# Patient Record
Sex: FEMALE | Race: WHITE | NOT HISPANIC OR LATINO | Employment: OTHER | URBAN - NONMETROPOLITAN AREA
[De-identification: names, ages, dates, MRNs, and addresses within clinical notes are randomized per-mention and may not be internally consistent; named-entity substitution may affect disease eponyms.]

---

## 2017-01-20 ENCOUNTER — OFFICE VISIT (OUTPATIENT)
Dept: FAMILY MEDICINE CLINIC | Facility: CLINIC | Age: 72
End: 2017-01-20

## 2017-01-20 VITALS
DIASTOLIC BLOOD PRESSURE: 80 MMHG | SYSTOLIC BLOOD PRESSURE: 130 MMHG | WEIGHT: 166 LBS | HEART RATE: 78 BPM | BODY MASS INDEX: 26.06 KG/M2 | OXYGEN SATURATION: 98 % | HEIGHT: 67 IN

## 2017-01-20 DIAGNOSIS — I10 ESSENTIAL HYPERTENSION: ICD-10-CM

## 2017-01-20 DIAGNOSIS — M51.36 DEGENERATIVE DISC DISEASE, LUMBAR: ICD-10-CM

## 2017-01-20 DIAGNOSIS — M15.9 DEGENERATIVE JOINT DISEASE INVOLVING MULTIPLE JOINTS ON BOTH SIDES OF BODY: ICD-10-CM

## 2017-01-20 DIAGNOSIS — E78.5 HYPERLIPIDEMIA, UNSPECIFIED HYPERLIPIDEMIA TYPE: ICD-10-CM

## 2017-01-20 DIAGNOSIS — E55.9 VITAMIN D DEFICIENCY: ICD-10-CM

## 2017-01-20 PROCEDURE — 99213 OFFICE O/P EST LOW 20 MIN: CPT | Performed by: GENERAL PRACTICE

## 2017-01-20 RX ORDER — HYDROCODONE BITARTRATE AND ACETAMINOPHEN 5; 325 MG/1; MG/1
1 TABLET ORAL EVERY 6 HOURS PRN
Qty: 120 TABLET | Refills: 0 | Status: SHIPPED | OUTPATIENT
Start: 2017-01-20 | End: 2017-03-17 | Stop reason: SDUPTHER

## 2017-01-20 NOTE — PROGRESS NOTES
Subjective   Jaleesa Nam is a 72 y.o. female.    For review and evaluation of management of chronic degenerative disc disease and osteoarthritis with pain in multiple joints. Has had multiple joint replacements.Pain is controlled with meds and no side effects. Needs refills.   Pain   This is a chronic problem. The current episode started more than 1 year ago. The problem occurs constantly. The problem has been unchanged. Associated symptoms include arthralgias and myalgias. Pertinent negatives include no abdominal pain, chest pain, chills, congestion, coughing, fatigue, fever, headaches, joint swelling, nausea, neck pain, numbness, rash, sore throat, vomiting or weakness. The symptoms are aggravated by walking. She has tried oral narcotics for the symptoms. The treatment provided significant relief.   Back Pain   This is a chronic problem. The current episode started more than 1 year ago. The problem occurs constantly. The problem is unchanged. The pain is present in the lumbar spine. The pain does not radiate. The pain is at a severity of 5/10. The pain is moderate. The pain is worse during the day. The symptoms are aggravated by bending, position, twisting and standing. Pertinent negatives include no abdominal pain, chest pain, dysuria, fever, headaches, numbness or weakness. She has tried analgesics for the symptoms. The treatment provided significant relief.      The following portions of the patient's history were reviewed and updated as appropriate: allergies, current medications, past social history and problem list.    Review of Systems   Constitutional: Negative.  Negative for activity change, appetite change, chills, fatigue, fever and unexpected weight change.   HENT: Negative.  Negative for congestion, ear pain, hearing loss, nosebleeds, rhinorrhea, sinus pressure, sneezing, sore throat, tinnitus and trouble swallowing.    Eyes: Negative.  Negative for pain, discharge, redness, itching and  "visual disturbance.   Respiratory: Negative.  Negative for apnea, cough, chest tightness, shortness of breath and wheezing.    Cardiovascular: Negative.  Negative for chest pain, palpitations and leg swelling.   Gastrointestinal: Negative.  Negative for abdominal distention, abdominal pain, constipation, diarrhea, nausea and vomiting.   Endocrine: Negative.    Genitourinary: Negative.  Negative for dysuria, frequency and urgency.   Musculoskeletal: Positive for arthralgias, back pain and myalgias. Negative for gait problem, joint swelling, neck pain and neck stiffness.   Skin: Negative.  Negative for color change and rash.   Allergic/Immunologic: Negative.    Neurological: Negative.  Negative for dizziness, weakness, light-headedness, numbness and headaches.   Hematological: Negative.  Negative for adenopathy.   Psychiatric/Behavioral: Negative.  Negative for dysphoric mood and sleep disturbance. The patient is not nervous/anxious.      Objective   Visit Vitals   • /80 (BP Location: Left arm, Patient Position: Sitting, Cuff Size: Adult)   • Pulse 78   • Ht 67\" (170.2 cm)   • Wt 166 lb (75.3 kg)   • SpO2 98%   • BMI 26 kg/m2     Physical Exam   Constitutional: She is oriented to person, place, and time. She appears well-developed and well-nourished. No distress.   HENT:   Head: Normocephalic and atraumatic.   Nose: Nose normal.   Mouth/Throat: Oropharynx is clear and moist.   Eyes: Conjunctivae and EOM are normal. Pupils are equal, round, and reactive to light. Right eye exhibits no discharge. Left eye exhibits no discharge.   Neck: No thyromegaly present.   Cardiovascular: Normal rate, regular rhythm, normal heart sounds and intact distal pulses.    Pulmonary/Chest: Effort normal and breath sounds normal.   Musculoskeletal:        Right shoulder: She exhibits tenderness.        Left shoulder: She exhibits tenderness.        Left knee: Tenderness found.        Lumbar back: She exhibits decreased range of motion " and tenderness.   Lymphadenopathy:     She has no cervical adenopathy.   Neurological: She is alert and oriented to person, place, and time.   Skin: Skin is warm and dry.   Psychiatric: She has a normal mood and affect.   Nursing note and vitals reviewed.    Assessment/Plan   Problem List Items Addressed This Visit        Cardiovascular and Mediastinum    Essential hypertension    Relevant Orders    Comprehensive Metabolic Panel    LDL Cholesterol, Direct    Lipid Panel       Musculoskeletal and Integument    Degenerative joint disease involving multiple joints on both sides of body    Relevant Orders    Urine Drug Screen      Other Visit Diagnoses     Hyperlipidemia, unspecified hyperlipidemia type        Relevant Orders    Comprehensive Metabolic Panel    LDL Cholesterol, Direct    Lipid Panel    Vitamin D deficiency        Relevant Orders    Vitamin D 25 Hydroxy    Degenerative disc disease, lumbar          Ricki reviewed and appropriate. Not recommended to drive or operate heavy equipment while taking potentially sedating meds.         New Medications Ordered This Visit   Medications   • HYDROcodone-acetaminophen (NORCO) 5-325 MG per tablet     Sig: Take 1 tablet by mouth Every 6 (Six) Hours As Needed for moderate pain (4-6).     Dispense:  120 tablet     Refill:  0

## 2017-01-20 NOTE — MR AVS SNAPSHOT
Jaleesa Nam   1/20/2017 11:30 AM   Office Visit    Dept Phone:  [phone]   Encounter #:  [id number]    Provider:  Emi Wright MD   Department:  [facility]                Your Full Care Plan              Today's Medication Changes          These changes are accurate as of: 1/20/17 11:58 AM.  If you have any questions, ask your nurse or doctor.               Stop taking medication(s)listed here:     doxycycline 100 MG capsule   Commonly known as:  VIBRAMYCIN   Stopped by:  Emi Wright MD           NASAFLO NETI POT NASAL WASH pack   Stopped by:  Emi Wright MD           predniSONE 20 MG tablet   Commonly known as:  DELTASONE   Stopped by:  Emi Wright MD                Where to Get Your Medications      You can get these medications from any pharmacy     Bring a paper prescription for each of these medications     HYDROcodone-acetaminophen 5-325 MG per tablet                  Your Updated Medication List          This list is accurate as of: 1/20/17 11:58 AM.  Always use your most recent med list.                amitriptyline 25 MG tablet   Commonly known as:  ELAVIL   Take 1 tablet by mouth Every Night.       CALTRATE 600+D 600-400 MG-UNIT per tablet   Generic drug:  calcium carbonate-vitamin d       HYDROcodone-acetaminophen 5-325 MG per tablet   Commonly known as:  NORCO   Take 1 tablet by mouth Every 6 (Six) Hours As Needed for moderate pain (4-6).       * lisinopril-hydrochlorothiazide 20-12.5 MG per tablet   Commonly known as:  PRINZIDE,ZESTORETIC       * lisinopril-hydrochlorothiazide 20-12.5 MG per tablet   Commonly known as:  PRINZIDE,ZESTORETIC   Take 1 tablet by mouth 2 (Two) Times a Day for blood pressure.       * Notice:  This list has 2 medication(s) that are the same as other medications prescribed for you. Read the directions carefully, and ask your doctor or other care provider to review them with you.            We Performed the Following     Urine Drug Screen       You Were Diagnosed With        Codes Comments    Hyperlipidemia, unspecified hyperlipidemia type    -  Primary ICD-10-CM: E78.5  ICD-9-CM: 272.4     Vitamin D deficiency     ICD-10-CM: E55.9  ICD-9-CM: 268.9     Essential hypertension     ICD-10-CM: I10  ICD-9-CM: 401.9     Degenerative joint disease involving multiple joints on both sides of body     ICD-10-CM: M15.9  ICD-9-CM: 715.89       Instructions     None    Patient Instructions History      Upcoming Appointments     Visit Type Date Time Department    OFFICE VISIT 1/20/2017 11:30 AM [facility]    OFFICE VISIT 3/17/2017  1:15 PM [facility]    PHYSICAL 6/12/2017  2:00 PM [facility]    SUBSEQUENT MEDICARE WELLNESS 6/12/2017  2:15 PM [facility]      MyChart Signup     Our records indicate that you have declined [facility] signup. If you would like to sign up for [employer], please email [email or web address] or call [phone] to obtain an activation code.             Other Info from Your Visit           Your Appointments     Mar 17, 2017  1:15 PM CDT   Office Visit with Emi Wright MD   [facility] (--)    [address]  [place] [address] [place] [id number] [phone]           Arrive 15 minutes prior to appointment.            Jun 12, 2017  2:00 PM CDT   Physical with Emi Wright MD   [facility] (--)    [address]  [place] [address] [place] [id number] [phone]           Arrive 15 minutes prior to appointment.            Jun 12, 2017  2:15 PM CDT   Subsequent Medicare Wellness with Emi Wright MD   [facility] (--)    [address]  [place] [address] [place] [id number] [phone]              Allergies     No Known Allergies      Reason for Visit     Med Refill     Pain      "Back Pain           Vital Signs     Blood Pressure Pulse Height Weight Oxygen Saturation Body Mass Index    130/80 (BP Location: Left arm, Patient Position: Sitting, Cuff Size: Adult) 78 67\" (170.2 cm) 166 lb (75.3 kg) 98% 26 kg/m2    Smoking Status                   Never Smoker           Problems and Diagnoses Noted     Degenerative joint disease involving multiple joints on both sides of body    High blood pressure    High cholesterol or triglycerides    -  Primary    Vitamin D deficiency            "

## 2017-03-17 ENCOUNTER — OFFICE VISIT (OUTPATIENT)
Dept: FAMILY MEDICINE CLINIC | Facility: CLINIC | Age: 72
End: 2017-03-17

## 2017-03-17 VITALS
WEIGHT: 166 LBS | HEIGHT: 67 IN | BODY MASS INDEX: 26.06 KG/M2 | OXYGEN SATURATION: 98 % | HEART RATE: 73 BPM | SYSTOLIC BLOOD PRESSURE: 134 MMHG | DIASTOLIC BLOOD PRESSURE: 60 MMHG

## 2017-03-17 DIAGNOSIS — M15.9 DEGENERATIVE JOINT DISEASE INVOLVING MULTIPLE JOINTS ON BOTH SIDES OF BODY: Primary | ICD-10-CM

## 2017-03-17 DIAGNOSIS — M51.36 DEGENERATIVE DISC DISEASE, LUMBAR: ICD-10-CM

## 2017-03-17 PROCEDURE — 99214 OFFICE O/P EST MOD 30 MIN: CPT | Performed by: GENERAL PRACTICE

## 2017-03-17 RX ORDER — HYDROCODONE BITARTRATE AND ACETAMINOPHEN 5; 325 MG/1; MG/1
1 TABLET ORAL EVERY 6 HOURS PRN
Qty: 120 TABLET | Refills: 0 | Status: SHIPPED | OUTPATIENT
Start: 2017-03-17 | End: 2017-05-19 | Stop reason: SDUPTHER

## 2017-03-17 NOTE — PROGRESS NOTES
Subjective   Jaleesa Nam is a 72 y.o. female.    For review and evaluation of management of chronic degenerative disc disease and osteoarthritis with pain in multiple joints. Has had multiple joint replacements.Pain is controlled with meds and no side effects. Needs refills.   Pain   This is a chronic problem. The current episode started more than 1 year ago. The problem occurs constantly. The problem has been unchanged. Associated symptoms include arthralgias and myalgias. Pertinent negatives include no abdominal pain, chills, congestion, coughing, fatigue, fever, joint swelling, nausea, numbness, rash, sore throat, vomiting or weakness. The symptoms are aggravated by walking. She has tried oral narcotics for the symptoms. The treatment provided significant relief.   Back Pain   This is a chronic problem. The current episode started more than 1 year ago. The problem occurs constantly. The problem is unchanged. The pain is present in the lumbar spine. The pain does not radiate. The pain is at a severity of 5/10. The pain is moderate. The pain is worse during the day. The symptoms are aggravated by bending, position, twisting and standing. Pertinent negatives include no abdominal pain, dysuria, fever, numbness or weakness. She has tried analgesics for the symptoms. The treatment provided significant relief.      The following portions of the patient's history were reviewed and updated as appropriate: allergies, current medications, past social history and problem list.    Review of Systems   Constitutional: Negative.  Negative for activity change, appetite change, chills, fatigue, fever and unexpected weight change.   HENT: Negative.  Negative for congestion, ear pain, hearing loss, nosebleeds, rhinorrhea, sinus pressure, sneezing, sore throat, tinnitus and trouble swallowing.    Eyes: Negative.  Negative for pain, discharge, redness, itching and visual disturbance.   Respiratory: Negative.  Negative for  "apnea, cough, chest tightness and wheezing.    Cardiovascular: Negative.  Negative for leg swelling.   Gastrointestinal: Negative.  Negative for abdominal distention, abdominal pain, constipation, diarrhea, nausea and vomiting.   Endocrine: Negative.    Genitourinary: Negative.  Negative for dysuria, frequency and urgency.   Musculoskeletal: Positive for arthralgias, back pain and myalgias. Negative for gait problem, joint swelling and neck stiffness.   Skin: Negative.  Negative for color change and rash.   Allergic/Immunologic: Negative.    Neurological: Negative.  Negative for dizziness, weakness, light-headedness and numbness.   Hematological: Negative.  Negative for adenopathy.   Psychiatric/Behavioral: Negative.  Negative for dysphoric mood and sleep disturbance. The patient is not nervous/anxious.      Objective   Visit Vitals   • /60 (BP Location: Left arm, Patient Position: Sitting, Cuff Size: Adult)   • Pulse 73   • Ht 67\" (170.2 cm)   • Wt 166 lb (75.3 kg)   • SpO2 98%   • BMI 26 kg/m2     Physical Exam   Constitutional: She is oriented to person, place, and time. She appears well-developed and well-nourished. No distress.   HENT:   Head: Normocephalic and atraumatic.   Nose: Nose normal.   Mouth/Throat: Oropharynx is clear and moist.   Eyes: Conjunctivae and EOM are normal. Pupils are equal, round, and reactive to light. Right eye exhibits no discharge. Left eye exhibits no discharge.   Neck: No thyromegaly present.   Cardiovascular: Normal rate, regular rhythm, normal heart sounds and intact distal pulses.    Pulmonary/Chest: Effort normal and breath sounds normal.   Musculoskeletal:        Right shoulder: She exhibits tenderness.        Left shoulder: She exhibits tenderness.        Left knee: Tenderness found.        Lumbar back: She exhibits decreased range of motion and tenderness.   Lymphadenopathy:     She has no cervical adenopathy.   Neurological: She is alert and oriented to person, place, " and time.   Skin: Skin is warm and dry.   Psychiatric: She has a normal mood and affect.   Nursing note and vitals reviewed.    Assessment/Plan   Problem List Items Addressed This Visit        Musculoskeletal and Integument    Degenerative joint disease involving multiple joints on both sides of body - Primary      Other Visit Diagnoses     Degenerative disc disease, lumbar            Ricki reviewed and appropriate. Not recommended to drive or operate heavy equipment while taking potentially sedating meds.         New Medications Ordered This Visit   Medications   • HYDROcodone-acetaminophen (NORCO) 5-325 MG per tablet     Sig: Take 1 tablet by mouth Every 6 (Six) Hours As Needed for Moderate Pain (4-6).     Dispense:  120 tablet     Refill:  0

## 2017-03-20 RX ORDER — LISINOPRIL AND HYDROCHLOROTHIAZIDE 20; 12.5 MG/1; MG/1
1 TABLET ORAL 2 TIMES DAILY
Qty: 180 TABLET | Refills: 3 | Status: SHIPPED | OUTPATIENT
Start: 2017-03-20 | End: 2017-11-14 | Stop reason: SDUPTHER

## 2017-05-17 ENCOUNTER — TELEPHONE (OUTPATIENT)
Dept: FAMILY MEDICINE CLINIC | Facility: CLINIC | Age: 72
End: 2017-05-17

## 2017-05-19 ENCOUNTER — OFFICE VISIT (OUTPATIENT)
Dept: FAMILY MEDICINE CLINIC | Facility: CLINIC | Age: 72
End: 2017-05-19

## 2017-05-19 VITALS
HEART RATE: 97 BPM | DIASTOLIC BLOOD PRESSURE: 70 MMHG | HEIGHT: 67 IN | WEIGHT: 166 LBS | BODY MASS INDEX: 26.06 KG/M2 | SYSTOLIC BLOOD PRESSURE: 120 MMHG

## 2017-05-19 DIAGNOSIS — M51.36 DEGENERATIVE DISC DISEASE, LUMBAR: ICD-10-CM

## 2017-05-19 DIAGNOSIS — I10 ESSENTIAL HYPERTENSION: ICD-10-CM

## 2017-05-19 DIAGNOSIS — Z11.59 NEED FOR HEPATITIS C SCREENING TEST: ICD-10-CM

## 2017-05-19 DIAGNOSIS — M15.9 DEGENERATIVE JOINT DISEASE INVOLVING MULTIPLE JOINTS ON BOTH SIDES OF BODY: Primary | Chronic | ICD-10-CM

## 2017-05-19 PROCEDURE — 99213 OFFICE O/P EST LOW 20 MIN: CPT | Performed by: GENERAL PRACTICE

## 2017-05-19 RX ORDER — HYDROCODONE BITARTRATE AND ACETAMINOPHEN 5; 325 MG/1; MG/1
1 TABLET ORAL EVERY 6 HOURS PRN
Qty: 120 TABLET | Refills: 0 | Status: SHIPPED | OUTPATIENT
Start: 2017-05-19 | End: 2017-06-12 | Stop reason: SDUPTHER

## 2017-06-09 DIAGNOSIS — Z13.820 ENCOUNTER FOR SCREENING FOR OSTEOPOROSIS: Primary | ICD-10-CM

## 2017-06-09 DIAGNOSIS — Z12.31 ENCOUNTER FOR SCREENING MAMMOGRAM FOR MALIGNANT NEOPLASM OF BREAST: ICD-10-CM

## 2017-06-12 ENCOUNTER — OFFICE VISIT (OUTPATIENT)
Dept: FAMILY MEDICINE CLINIC | Facility: CLINIC | Age: 72
End: 2017-06-12

## 2017-06-12 ENCOUNTER — LAB (OUTPATIENT)
Dept: LAB | Facility: HOSPITAL | Age: 72
End: 2017-06-12

## 2017-06-12 VITALS
OXYGEN SATURATION: 97 % | SYSTOLIC BLOOD PRESSURE: 110 MMHG | HEART RATE: 72 BPM | WEIGHT: 168 LBS | HEIGHT: 66 IN | BODY MASS INDEX: 27 KG/M2 | DIASTOLIC BLOOD PRESSURE: 70 MMHG

## 2017-06-12 DIAGNOSIS — M51.36 DEGENERATIVE DISC DISEASE, LUMBAR: Chronic | ICD-10-CM

## 2017-06-12 DIAGNOSIS — Z00.00 MEDICARE ANNUAL WELLNESS VISIT, INITIAL: Primary | ICD-10-CM

## 2017-06-12 DIAGNOSIS — E55.9 VITAMIN D DEFICIENCY: ICD-10-CM

## 2017-06-12 DIAGNOSIS — R73.9 HYPERGLYCEMIA: ICD-10-CM

## 2017-06-12 DIAGNOSIS — I10 ESSENTIAL HYPERTENSION: Chronic | ICD-10-CM

## 2017-06-12 DIAGNOSIS — I10 ESSENTIAL HYPERTENSION: ICD-10-CM

## 2017-06-12 DIAGNOSIS — M15.9 DEGENERATIVE JOINT DISEASE INVOLVING MULTIPLE JOINTS ON BOTH SIDES OF BODY: Chronic | ICD-10-CM

## 2017-06-12 DIAGNOSIS — Z23 NEED FOR VACCINATION: ICD-10-CM

## 2017-06-12 DIAGNOSIS — Z13.820 ENCOUNTER FOR SCREENING FOR OSTEOPOROSIS: ICD-10-CM

## 2017-06-12 DIAGNOSIS — Z12.31 ENCOUNTER FOR SCREENING MAMMOGRAM FOR MALIGNANT NEOPLASM OF BREAST: ICD-10-CM

## 2017-06-12 DIAGNOSIS — E78.5 HYPERLIPIDEMIA, UNSPECIFIED HYPERLIPIDEMIA TYPE: ICD-10-CM

## 2017-06-12 DIAGNOSIS — M51.36 DEGENERATIVE DISC DISEASE, LUMBAR: ICD-10-CM

## 2017-06-12 DIAGNOSIS — Z11.59 NEED FOR HEPATITIS C SCREENING TEST: ICD-10-CM

## 2017-06-12 LAB
25(OH)D3 SERPL-MCNC: 29.7 NG/ML (ref 30–100)
ALBUMIN SERPL-MCNC: 4.4 G/DL (ref 3.4–4.8)
ALBUMIN/GLOB SERPL: 1.2 G/DL (ref 1.1–1.8)
ALP SERPL-CCNC: 103 U/L (ref 38–126)
ALT SERPL W P-5'-P-CCNC: 37 U/L (ref 9–52)
AMPHET+METHAMPHET UR QL: NEGATIVE
ANION GAP SERPL CALCULATED.3IONS-SCNC: 9 MMOL/L (ref 5–15)
ARTICHOKE IGE QN: 126 MG/DL (ref 1–129)
AST SERPL-CCNC: 67 U/L (ref 14–36)
BACTERIA UR QL AUTO: ABNORMAL /HPF
BARBITURATES UR QL SCN: NEGATIVE
BENZODIAZ UR QL SCN: NEGATIVE
BILIRUB SERPL-MCNC: 1 MG/DL (ref 0.2–1.3)
BILIRUB UR QL STRIP: NEGATIVE
BUN BLD-MCNC: 21 MG/DL (ref 7–21)
BUN/CREAT SERPL: 20.8 (ref 7–25)
CALCIUM SPEC-SCNC: 9.9 MG/DL (ref 8.4–10.2)
CANNABINOIDS SERPL QL: NEGATIVE
CHLORIDE SERPL-SCNC: 99 MMOL/L (ref 95–110)
CHOLEST SERPL-MCNC: 193 MG/DL (ref 0–199)
CLARITY UR: CLEAR
CO2 SERPL-SCNC: 31 MMOL/L (ref 22–31)
COCAINE UR QL: NEGATIVE
COLOR UR: YELLOW
CREAT BLD-MCNC: 1.01 MG/DL (ref 0.5–1)
GFR SERPL CREATININE-BSD FRML MDRD: 54 ML/MIN/1.73 (ref 60–90)
GLOBULIN UR ELPH-MCNC: 3.6 GM/DL (ref 2.3–3.5)
GLUCOSE BLD-MCNC: 128 MG/DL (ref 60–100)
GLUCOSE UR STRIP-MCNC: NEGATIVE MG/DL
HDLC SERPL-MCNC: 51 MG/DL (ref 60–200)
HGB UR QL STRIP.AUTO: NEGATIVE
HYALINE CASTS UR QL AUTO: ABNORMAL /LPF
KETONES UR QL STRIP: NEGATIVE
LDLC/HDLC SERPL: 2.15 {RATIO} (ref 0–3.22)
LEUKOCYTE ESTERASE UR QL STRIP.AUTO: ABNORMAL
METHADONE UR QL SCN: NEGATIVE
NITRITE UR QL STRIP: NEGATIVE
OPIATES UR QL: POSITIVE
OXYCODONE UR QL SCN: NEGATIVE
PH UR STRIP.AUTO: 7 [PH] (ref 5–9)
POTASSIUM BLD-SCNC: 4.4 MMOL/L (ref 3.5–5.1)
PROT SERPL-MCNC: 8 G/DL (ref 6.3–8.6)
PROT UR QL STRIP: NEGATIVE
RBC # UR: ABNORMAL /HPF
REF LAB TEST METHOD: ABNORMAL
SODIUM BLD-SCNC: 139 MMOL/L (ref 137–145)
SP GR UR STRIP: 1.02 (ref 1–1.03)
SQUAMOUS #/AREA URNS HPF: ABNORMAL /HPF
TRIGL SERPL-MCNC: 163 MG/DL (ref 20–199)
UROBILINOGEN UR QL STRIP: ABNORMAL
WBC UR QL AUTO: ABNORMAL /HPF

## 2017-06-12 PROCEDURE — 90732 PPSV23 VACC 2 YRS+ SUBQ/IM: CPT | Performed by: GENERAL PRACTICE

## 2017-06-12 PROCEDURE — 99214 OFFICE O/P EST MOD 30 MIN: CPT | Performed by: GENERAL PRACTICE

## 2017-06-12 PROCEDURE — 80307 DRUG TEST PRSMV CHEM ANLYZR: CPT | Performed by: GENERAL PRACTICE

## 2017-06-12 PROCEDURE — 36415 COLL VENOUS BLD VENIPUNCTURE: CPT

## 2017-06-12 PROCEDURE — 80061 LIPID PANEL: CPT | Performed by: GENERAL PRACTICE

## 2017-06-12 PROCEDURE — 82306 VITAMIN D 25 HYDROXY: CPT | Performed by: GENERAL PRACTICE

## 2017-06-12 PROCEDURE — 80053 COMPREHEN METABOLIC PANEL: CPT | Performed by: GENERAL PRACTICE

## 2017-06-12 PROCEDURE — G0438 PPPS, INITIAL VISIT: HCPCS | Performed by: GENERAL PRACTICE

## 2017-06-12 PROCEDURE — G0009 ADMIN PNEUMOCOCCAL VACCINE: HCPCS | Performed by: GENERAL PRACTICE

## 2017-06-12 PROCEDURE — 81001 URINALYSIS AUTO W/SCOPE: CPT | Performed by: GENERAL PRACTICE

## 2017-06-12 PROCEDURE — 86803 HEPATITIS C AB TEST: CPT | Performed by: GENERAL PRACTICE

## 2017-06-12 RX ORDER — LISINOPRIL 20 MG/1
20 TABLET ORAL DAILY
Qty: 180 TABLET | Refills: 3 | Status: SHIPPED | OUTPATIENT
Start: 2017-06-12 | End: 2018-09-07 | Stop reason: SDUPTHER

## 2017-06-12 RX ORDER — HYDROCODONE BITARTRATE AND ACETAMINOPHEN 5; 325 MG/1; MG/1
1 TABLET ORAL EVERY 6 HOURS PRN
Qty: 120 TABLET | Refills: 0 | Status: SHIPPED | OUTPATIENT
Start: 2017-06-12 | End: 2017-09-05 | Stop reason: SDUPTHER

## 2017-06-12 NOTE — PROGRESS NOTES
Subjective   Jaleesa Nam is a 72 y.o. female.     Chief Complaint   Patient presents with   • Annual Exam     labs [facility] medicare wellness     For review and evaluation of management of chronic medical problems. Labs reviewed. Due for mammogram and bone density. Pain is controlled with medications. No side effects.   Pain   This is a chronic problem. The current episode started more than 1 year ago. The problem occurs constantly. The problem has been unchanged. Associated symptoms include arthralgias and myalgias. Pertinent negatives include no abdominal pain, chest pain, chills, congestion, coughing, fatigue, fever, headaches, joint swelling, nausea, neck pain, numbness, rash, sore throat, vomiting or weakness. The symptoms are aggravated by walking. She has tried oral narcotics for the symptoms. The treatment provided significant relief.   Back Pain   This is a chronic problem. The current episode started more than 1 year ago. The problem occurs constantly. The problem is unchanged. The pain is present in the lumbar spine. The pain does not radiate. The pain is at a severity of 5/10. The pain is moderate. The pain is worse during the day. The symptoms are aggravated by bending, position, twisting and standing. Pertinent negatives include no abdominal pain, chest pain, dysuria, fever, headaches, numbness or weakness. She has tried analgesics for the symptoms. The treatment provided significant relief.   Hypertension   This is a chronic problem. The current episode started more than 1 year ago. The problem is unchanged. The problem is controlled. Pertinent negatives include no chest pain, headaches, neck pain, palpitations or shortness of breath. There are no associated agents to hypertension. There are no known risk factors for coronary artery disease. Past treatments include ACE inhibitors and diuretics. The current treatment provides significant improvement. There are no compliance problems.     Arthritis   Presents for follow-up visit. She complains of pain, stiffness and joint warmth. She reports no joint swelling. The symptoms have been stable. Affected locations include the right shoulder, left shoulder, left MCP, right MCP, left knee, right knee, right hip and left hip. Associated symptoms include pain at night. Pertinent negatives include no diarrhea, dysuria, fatigue, fever, rash or Raynaud's syndrome. Compliance with total regimen is %. Compliance with medications is %.      The following portions of the patient's history were reviewed and updated as appropriate: allergies, current medications, past family and social history and problem list.    Current Outpatient Prescriptions:   •  amitriptyline (ELAVIL) 25 MG tablet, Take 1 tablet by mouth Every Night., Disp: 90 tablet, Rfl: 3  •  calcium carbonate-vitamin d (CALTRATE 600+D) 600-400 MG-UNIT per tablet, Take 1 tablet by mouth daily., Disp: , Rfl:   •  HYDROcodone-acetaminophen (NORCO) 5-325 MG per tablet, Take 1 tablet by mouth Every 6 (Six) Hours As Needed for Moderate Pain (4-6)., Disp: 120 tablet, Rfl: 0  •  lisinopril-hydrochlorothiazide (PRINZIDE,ZESTORETIC) 20-12.5 MG per tablet, Take 1 tablet by mouth 2 (Two) Times a Day for blood pressure., Disp: 180 tablet, Rfl: 3  •  lisinopril (PRINIVIL,ZESTRIL) 20 MG tablet, Take 1 tablet by mouth Daily., Disp: 180 tablet, Rfl: 3    Review of Systems   Constitutional: Negative.  Negative for activity change, appetite change, chills, fatigue, fever and unexpected weight change.   HENT: Negative.  Negative for congestion, ear discharge, ear pain, facial swelling, hearing loss, mouth sores, nosebleeds, postnasal drip, rhinorrhea, sinus pressure, sneezing, sore throat, tinnitus and trouble swallowing.    Eyes: Negative.  Negative for pain, discharge, redness, itching and visual disturbance.   Respiratory: Negative.  Negative for apnea, cough, chest tightness, shortness of breath and wheezing.  "   Cardiovascular: Negative.  Negative for chest pain, palpitations and leg swelling.   Gastrointestinal: Negative.  Negative for abdominal distention, abdominal pain, constipation, diarrhea, nausea and vomiting.   Endocrine: Negative.    Genitourinary: Negative.  Negative for dysuria, frequency and urgency.   Musculoskeletal: Positive for arthralgias, arthritis, back pain, myalgias and stiffness. Negative for gait problem, joint swelling, neck pain and neck stiffness.   Skin: Negative.  Negative for color change and rash.   Allergic/Immunologic: Negative.    Neurological: Negative.  Negative for dizziness, weakness, light-headedness, numbness and headaches.   Hematological: Negative.  Negative for adenopathy.   Psychiatric/Behavioral: Negative.  Negative for dysphoric mood and sleep disturbance. The patient is not nervous/anxious.      Objective     Visit Vitals   • /70   • Pulse 72   • Ht 66\" (167.6 cm)   • Wt 168 lb (76.2 kg)   • SpO2 97%   • BMI 27.12 kg/m2     Physical Exam   Constitutional: She is oriented to person, place, and time. She appears well-developed and well-nourished. No distress.   HENT:   Head: Normocephalic and atraumatic.   Nose: Nose normal.   Mouth/Throat: Oropharynx is clear and moist.   Eyes: Conjunctivae and EOM are normal. Pupils are equal, round, and reactive to light. Right eye exhibits no discharge. Left eye exhibits no discharge.   Neck: No tracheal deviation present. No thyromegaly present.   Cardiovascular: Normal rate, regular rhythm, normal heart sounds and intact distal pulses.    No murmur heard.  Pulmonary/Chest: Effort normal and breath sounds normal. No respiratory distress. She has no wheezes. She has no rales. She exhibits no tenderness. Right breast exhibits no inverted nipple, no mass, no nipple discharge, no skin change and no tenderness. Left breast exhibits no inverted nipple, no mass, no nipple discharge, no skin change and no tenderness.       Abdominal: Soft. " Bowel sounds are normal. She exhibits no distension and no mass. There is no tenderness. No hernia.   Musculoskeletal: She exhibits no deformity.        Right shoulder: She exhibits tenderness.        Left shoulder: She exhibits tenderness.        Right hip: She exhibits tenderness.        Left hip: She exhibits tenderness.        Right knee: Tenderness found.        Left knee: Tenderness found.        Lumbar back: She exhibits decreased range of motion and tenderness.   Lymphadenopathy:     She has no cervical adenopathy.   Neurological: She is alert and oriented to person, place, and time. She has normal reflexes.   Skin: Skin is warm and dry.   Psychiatric: She has a normal mood and affect. Her behavior is normal. Judgment and thought content normal.   Nursing note and vitals reviewed.    Results for orders placed or performed in visit on 06/12/17   Urinalysis With / Microscopic If Indicated   Result Value Ref Range    Color, UA Yellow Yellow, Straw, Dark Yellow, Sandra    Appearance, UA Clear Clear    pH, UA 7.0 5.0 - 9.0    Specific Gravity, UA 1.016 1.003 - 1.030    Glucose, UA Negative Negative    Ketones, UA Negative Negative    Bilirubin, UA Negative Negative    Blood, UA Negative Negative    Protein, UA Negative Negative    Leuk Esterase, UA Moderate (2+) (A) Negative    Nitrite, UA Negative Negative    Urobilinogen, UA 0.2 E.U./dL 0.2 - 1.0 E.U./dL   Urine Drug Screen   Result Value Ref Range    Amphetamine Screen, Urine Negative Negative    Barbiturates Screen, Urine Negative Negative    Benzodiazepine Screen, Urine Negative Negative    Cocaine Screen, Urine Negative Negative    Methadone Screen, Urine Negative Negative    Opiate Screen Positive (A) Negative    Oxycodone Screen, Urine Negative Negative    THC, Screen, Urine Negative Negative   Comprehensive Metabolic Panel   Result Value Ref Range    Glucose 128 (H) 60 - 100 mg/dL    BUN 21 7 - 21 mg/dL    Creatinine 1.01 (H) 0.50 - 1.00 mg/dL    Sodium  139 137 - 145 mmol/L    Potassium 4.4 3.5 - 5.1 mmol/L    Chloride 99 95 - 110 mmol/L    CO2 31.0 22.0 - 31.0 mmol/L    Calcium 9.9 8.4 - 10.2 mg/dL    Total Protein 8.0 6.3 - 8.6 g/dL    Albumin 4.40 3.40 - 4.80 g/dL    ALT (SGPT) 37 9 - 52 U/L    AST (SGOT) 67 (H) 14 - 36 U/L    Alkaline Phosphatase 103 38 - 126 U/L    Total Bilirubin 1.0 0.2 - 1.3 mg/dL    eGFR Non African Amer 54 (L) >60 mL/min/1.73    Globulin 3.6 (H) 2.3 - 3.5 gm/dL    A/G Ratio 1.2 1.1 - 1.8 g/dL    BUN/Creatinine Ratio 20.8 7.0 - 25.0    Anion Gap 9.0 5.0 - 15.0 mmol/L   Lipid Panel   Result Value Ref Range    Total Cholesterol 193 0 - 199 mg/dL    Triglycerides 163 20 - 199 mg/dL    HDL Cholesterol 51 (L) 60 - 200 mg/dL    LDL Cholesterol  126 1 - 129 mg/dL    LDL/HDL Ratio 2.15 0.00 - 3.22   Vitamin D 25 Hydroxy   Result Value Ref Range    25 Hydroxy, Vitamin D 29.7 (L) 30.0 - 100.0 ng/ml   Urinalysis, Microscopic Only   Result Value Ref Range    RBC, UA 3-5 (A) None Seen /HPF    WBC, UA 3-5 None Seen, 0-2, 3-5 /HPF    Bacteria, UA None Seen None Seen /HPF    Squamous Epithelial Cells, UA None Seen None Seen, 0-2 /HPF    Hyaline Casts, UA None Seen None Seen /LPF    Methodology Automated Microscopy       Assessment/Plan   Problem List Items Addressed This Visit        Cardiovascular and Mediastinum    Essential hypertension (Chronic)    Relevant Medications    lisinopril (PRINIVIL,ZESTRIL) 20 MG tablet       Musculoskeletal and Integument    Degenerative joint disease involving multiple joints on both sides of body (Chronic)      Other Visit Diagnoses     Medicare annual wellness visit, initial    -  Primary    Hyperglycemia        Relevant Orders    Basic Metabolic Panel    Hemoglobin A1c    Degenerative disc disease, lumbar  (Chronic)       Encounter for screening for osteoporosis        Encounter for screening mammogram for malignant neoplasm of breast            Will notify regarding results. Decrease sweets and carbs in diet.  Recommended weight loss and exercise. Discontinue hydrochlorothiazide as blood pressure well controlled. Ricki reviewed and appropriate. Not recommended to drive or operate heavy equipment while taking potentially sedating meds.    New Medications Ordered This Visit   Medications   • lisinopril (PRINIVIL,ZESTRIL) 20 MG tablet     Sig: Take 1 tablet by mouth Daily.     Dispense:  180 tablet     Refill:  3   • HYDROcodone-acetaminophen (NORCO) 5-325 MG per tablet     Sig: Take 1 tablet by mouth Every 6 (Six) Hours As Needed for Moderate Pain (4-6).     Dispense:  120 tablet     Refill:  0     Return in about 12 weeks (around 9/4/2017) for Recheck.

## 2017-06-12 NOTE — PROGRESS NOTES
QUICK REFERENCE INFORMATION:  The ABCs of the Annual Wellness Visit    Initial Medicare Wellness Visit    HEALTH RISK ASSESSMENT    1945    Recent Hospitalizations:  No hospitalization(s) within the last year..    Current Medical Providers:  Patient Care Team:  Emi Wright MD as PCP - General  Emi Wright MD as PCP - Claims Attributed  Reji Simpson MD as Consulting Physician (Ophthalmology)    Smoking Status:  History   Smoking Status   • Never Smoker   Smokeless Tobacco   • Never Used     Alcohol Consumption:  History   Alcohol use Not on file     Depression Screen:   PHQ-9 Depression Screening 6/12/2017   Little interest or pleasure in doing things 0   Feeling down, depressed, or hopeless 0   Trouble falling or staying asleep, or sleeping too much 1   Feeling tired or having little energy 0   Poor appetite or overeating 0   Feeling bad about yourself - or that you are a failure or have let yourself or your family down 0   Trouble concentrating on things, such as reading the newspaper or watching television 0   Moving or speaking so slowly that other people could have noticed. Or the opposite - being so fidgety or restless that you have been moving around a lot more than usual 0   Thoughts that you would be better off dead, or of hurting yourself in some way 0   PHQ-9 Total Score 1     Health Habits and Functional and Cognitive Screening:  Functional & Cognitive Status 6/12/2017   Do you have difficulty preparing food and eating? No   Do you have difficulty bathing yourself? No   Do you have difficulty getting dressed? No   Do you have difficulty using the toilet? No   Do you have difficulty moving around from place to place? No   In the past year have you fallen or experienced a near fall? No   Do you need help using the phone?  No   Are you deaf or do you have serious difficulty hearing?  Yes   Do you need help with transportation? No   Do you need help shopping? No   Do you need help  preparing meals?  No   Do you need help with housework?  No   Do you need help with laundry? No   Do you need help taking your medications? No   Do you need help managing money? No     Health Habits  Current Diet: Well Balanced Diet  Dental Exam: Up to date  Eye Exam: Up to date  Exercise (times per week): 0 times per week  Current Exercise Activities Include: Walking    Does the patient have evidence of cognitive impairment? No    Asiprin use counseling: Does not need ASA (and currently is not on it)    Recent Lab Results:    Visual Acuity:  No exam data present    Age-appropriate Screening Schedule:  Refer to the list below for future screening recommendations based on patient's age, sex and/or medical conditions. Orders for these recommended tests are listed in the plan section. The patient has been provided with a written plan.    Health Maintenance   Topic Date Due   • INFLUENZA VACCINE  08/01/2017   • LIPID PANEL  06/12/2018   • COLONOSCOPY  10/07/2018   • MAMMOGRAM  06/12/2019   • TDAP/TD VACCINES (2 - Td) 06/12/2027   • PNEUMOCOCCAL VACCINES (65+ LOW/MEDIUM RISK)  Completed   • ZOSTER VACCINE  Addressed        Subjective   History of Present Illness    Jaleesa Nam is a 72 y.o. female who presents for an Annual Wellness Visit.    The following portions of the patient's history were reviewed and updated as appropriate: allergies, current medications, past family history, past medical history, past social history, past surgical history and problem list.    Outpatient Medications Prior to Visit   Medication Sig Dispense Refill   • amitriptyline (ELAVIL) 25 MG tablet Take 1 tablet by mouth Every Night. 90 tablet 3   • calcium carbonate-vitamin d (CALTRATE 600+D) 600-400 MG-UNIT per tablet Take 1 tablet by mouth daily.     • lisinopril-hydrochlorothiazide (PRINZIDE,ZESTORETIC) 20-12.5 MG per tablet Take 1 tablet by mouth 2 (Two) Times a Day for blood pressure. 180 tablet 3   • HYDROcodone-acetaminophen  "(NORCO) 5-325 MG per tablet Take 1 tablet by mouth Every 6 (Six) Hours As Needed for Moderate Pain (4-6). 120 tablet 0     No facility-administered medications prior to visit.        Patient Active Problem List   Diagnosis   • Degenerative joint disease involving multiple joints on both sides of body   • Essential hypertension       Advance Care Planning:  power of  for healthcare on file, has an advance directive - a copy has been provided and is in file    Identification of Risk Factors:  Risk factors include: weight , increased fall risk, chronic pain and hearing limitations.    Review of Systems    Compared to one year ago, the patient feels her physical health is the same.  Compared to one year ago, the patient feels her mental health is the same.    Objective     Physical Exam    Vitals:    06/12/17 1401   BP: 110/70   Pulse: 72   SpO2: 97%   Weight: 168 lb (76.2 kg)   Height: 66\" (167.6 cm)   PainSc: 0-No pain       Body mass index is 27.12 kg/(m^2).  Discussed the patient's BMI with her. The BMI is above average; BMI management plan is completed.    Assessment/Plan   Patient Self-Management and Personalized Health Advice  The patient has been provided with information about: diet, exercise, weight management and fall prevention and preventive services including:   · Fall Risk assessment done, Pneumococcal vaccine , TdaP vaccine, Zostavax vaccine (Herpes Zoster).    Visit Diagnoses:    ICD-10-CM ICD-9-CM   1. Medicare annual wellness visit, initial Z00.00 V70.0   2. Hyperglycemia R73.9 790.29   3. Essential hypertension I10 401.9   4. Degenerative joint disease involving multiple joints on both sides of body M15.9 715.89   5. Degenerative disc disease, lumbar M51.36 722.52   6. Encounter for screening for osteoporosis Z13.820 V82.81   7. Encounter for screening mammogram for malignant neoplasm of breast Z12.31 V76.12   8. Need for vaccination Z23 V05.9       Orders Placed This Encounter   Procedures "   • Pneumococcal Polysaccharide Vaccine 23-Valent Greater Than or Equal To 3yo Subcutaneous / IM   • Basic Metabolic Panel     Standing Status:   Future     Standing Expiration Date:   6/12/2018   • Hemoglobin A1c     Standing Status:   Future     Standing Expiration Date:   6/12/2018       Outpatient Encounter Prescriptions as of 6/12/2017   Medication Sig Dispense Refill   • amitriptyline (ELAVIL) 25 MG tablet Take 1 tablet by mouth Every Night. 90 tablet 3   • calcium carbonate-vitamin d (CALTRATE 600+D) 600-400 MG-UNIT per tablet Take 1 tablet by mouth daily.     • HYDROcodone-acetaminophen (NORCO) 5-325 MG per tablet Take 1 tablet by mouth Every 6 (Six) Hours As Needed for Moderate Pain (4-6). 120 tablet 0   • lisinopril-hydrochlorothiazide (PRINZIDE,ZESTORETIC) 20-12.5 MG per tablet Take 1 tablet by mouth 2 (Two) Times a Day for blood pressure. 180 tablet 3   • [DISCONTINUED] HYDROcodone-acetaminophen (NORCO) 5-325 MG per tablet Take 1 tablet by mouth Every 6 (Six) Hours As Needed for Moderate Pain (4-6). 120 tablet 0   • lisinopril (PRINIVIL,ZESTRIL) 20 MG tablet Take 1 tablet by mouth Daily. 180 tablet 3     No facility-administered encounter medications on file as of 6/12/2017.        Reviewed use of high risk medication in the elderly: yes  Reviewed for potential of harmful drug interactions in the elderly: not applicable    Follow Up:  Return in about 12 weeks (around 9/4/2017) for Recheck.     An After Visit Summary and PPPS with all of these plans were given to the patient.   Information has been scanned into chart.  Discussed importance of taking medications as prescribed. Encouraged healthy eating habits with low fat, low salt choices and working towards maintaining a healthy weight. Recommended regular exercise if able as well as care to prevent falls.

## 2017-06-12 NOTE — PATIENT INSTRUCTIONS
Check on shingles and tetanus vaccines at pharmacy or with insurance.   Ty tumeric tablets for osteoarthritis     Medicare Wellness  Personal Prevention Plan of Service     Date of Office Visit:  2017  Encounter Provider:  Emi Wright MD  Place of Service:  [facility]  Patient Name: Jaleesa Nam  :  1945    As part of the Medicare Wellness portion of your visit today, we are providing you with this personalized preventive plan of services (PPPS). This plan is based upon recommendations of the [place] Preventive Services Task Force (USPSTF) and the Advisory Committee on Immunization Practices (ACIP).    This lists the preventive care services that should be considered, and provides dates of when you are due. Items listed as completed are up-to-date and do not require any further intervention.    Health Maintenance   Topic Date Due   • INFLUENZA VACCINE  2017   • LIPID PANEL  2018   • COLONOSCOPY  10/07/2018   • MAMMOGRAM  2019   • TDAP/TD VACCINES (2 - Td) 2027   • HEPATITIS C SCREENING  Completed   • PNEUMOCOCCAL VACCINES (65+ LOW/MEDIUM RISK)  Completed   • ZOSTER VACCINE  Addressed       Orders Placed This Encounter   Procedures   • Pneumococcal Polysaccharide Vaccine 23-Valent Greater Than or Equal To 1yo Subcutaneous / IM   • Basic Metabolic Panel     Standing Status:   Future     Standing Expiration Date:   2018   • Hemoglobin A1c     Standing Status:   Future     Standing Expiration Date:   2018       Return in about 12 weeks (around 2017) for Recheck.

## 2017-06-13 NOTE — PROGRESS NOTES
Call and tell bone density shows some bone loss but not osteoporosis, make sure she is taking Ca + D 600mg daily and exercising regularly

## 2017-06-14 ENCOUNTER — TELEPHONE (OUTPATIENT)
Dept: FAMILY MEDICINE CLINIC | Facility: CLINIC | Age: 72
End: 2017-06-14

## 2017-06-14 LAB — HCV AB SER DONR QL: NEGATIVE

## 2017-06-14 NOTE — TELEPHONE ENCOUNTER
----- Message from Emi Wright MD sent at 6/13/2017 12:53 PM CDT -----  Call and tell bone density shows some bone loss but not osteoporosis, make sure she is taking Ca + D 600mg daily and exercising regularly

## 2017-06-14 NOTE — PROGRESS NOTES
Pr Dr. Wright, Ms. Nam has been called with her recent lab results & recommendations.  Continue her current medications and follow-up as planned or sooner if any problems.

## 2017-07-17 ENCOUNTER — OFFICE VISIT (OUTPATIENT)
Dept: OPHTHALMOLOGY | Facility: CLINIC | Age: 72
End: 2017-07-17

## 2017-07-17 DIAGNOSIS — H25.049 POSTERIOR SUBCAPSULAR POLAR AGE-RELATED CATARACT, UNSPECIFIED LATERALITY: ICD-10-CM

## 2017-07-17 DIAGNOSIS — H52.203 ASTIGMATISM, BILATERAL: ICD-10-CM

## 2017-07-17 DIAGNOSIS — H52.13 MYOPIA, BILATERAL: ICD-10-CM

## 2017-07-17 DIAGNOSIS — IMO0002 NUCLEAR CATARACT, BILATERAL: ICD-10-CM

## 2017-07-17 DIAGNOSIS — M35.01 KERATITIS SICCA, BILATERAL (HCC): Primary | ICD-10-CM

## 2017-07-17 PROCEDURE — 99214 OFFICE O/P EST MOD 30 MIN: CPT | Performed by: OPHTHALMOLOGY

## 2017-07-17 NOTE — PROGRESS NOTES
Subjective   Jaleesa Nam is a 72 y.o. female.   No chief complaint on file.    HPI     fb sensation OS x 2 weeks, ? BV, did not get the prev glasses       Last edited by Reji Simpson MD on 7/17/2017  1:27 PM.       Review of Systems    Objective   Base Eye Exam     Visual Acuity (Snellen - Linear)      Right Left   Dist cc 20/50 +2 20/60 -1         Tonometry (Applanation, 1:27 PM)      Right Left   Pressure 18 19         Pupils      Pupils   Right PERRL   Left PERRL         Visual Fields      Left Right   Result Full Full         Extraocular Movement      Right Left   Result Full Full         Dilation     Both eyes:  1.0% Mydriacyl, 2.5% Erickson Synephrine @ 1:30 PM            Slit Lamp and Fundus Exam     External Exam      Right Left    External Normal Normal      Slit Lamp Exam      Right Left    Lids/Lashes Normal Normal    Conjunctiva/Sclera White and quiet White and quiet    Cornea Clear Clear    Anterior Chamber Deep and quiet Deep and quiet    Iris Round and reactive Round and reactive    Lens 1+ Nuclear sclerosis, 1+ Posterior subcapsular cataract 1+ Nuclear sclerosis, 1+ Cortical cataract    Vitreous Normal Normal      Fundus Exam      Right Left    Disc Normal Normal    Macula Normal Normal    Vessels Normal Normal    Periphery Normal 360 depression; no retinal detachment, no retinal tear            Refraction     Wearing Rx      Sphere Cylinder Axis Add   Right -2.00 +1.50 015 +2.50   Left -1.25 +1.50 155 +2.50         Manifest Refraction      Sphere Cylinder Axis Dist Add   Right -2.00 +1.75 015 20/40 +2.50   Left -2.50 +2.50 175 20/40 +2.50         Final Rx      Sphere Cylinder Axis Add   Right -2.00 +1.75 015 +2.50   Left -2.50 +2.50 175 +2.50                   Assessment/Plan   Diagnoses and all orders for this visit:    Keratitis sicca, bilateral    Nuclear cataract, bilateral    Posterior subcapsular polar age-related cataract, unspecified laterality    Myopia,  bilateral    Astigmatism, bilateral      Plan:    artificial tears frequently both eyes.  Glasses Rx given per refraction

## 2017-08-24 RX ORDER — AMITRIPTYLINE HYDROCHLORIDE 25 MG/1
25 TABLET, FILM COATED ORAL NIGHTLY
Qty: 90 TABLET | Refills: 3 | Status: SHIPPED | OUTPATIENT
Start: 2017-08-24 | End: 2017-11-14 | Stop reason: SDUPTHER

## 2017-09-05 ENCOUNTER — LAB (OUTPATIENT)
Dept: LAB | Facility: HOSPITAL | Age: 72
End: 2017-09-05

## 2017-09-05 ENCOUNTER — OFFICE VISIT (OUTPATIENT)
Dept: FAMILY MEDICINE CLINIC | Facility: CLINIC | Age: 72
End: 2017-09-05

## 2017-09-05 VITALS
HEART RATE: 67 BPM | DIASTOLIC BLOOD PRESSURE: 78 MMHG | HEIGHT: 66 IN | SYSTOLIC BLOOD PRESSURE: 134 MMHG | WEIGHT: 169.6 LBS | OXYGEN SATURATION: 99 % | BODY MASS INDEX: 27.26 KG/M2

## 2017-09-05 DIAGNOSIS — M51.36 DEGENERATIVE DISC DISEASE, LUMBAR: ICD-10-CM

## 2017-09-05 DIAGNOSIS — M15.9 DEGENERATIVE JOINT DISEASE INVOLVING MULTIPLE JOINTS ON BOTH SIDES OF BODY: Primary | ICD-10-CM

## 2017-09-05 DIAGNOSIS — R73.9 HYPERGLYCEMIA: ICD-10-CM

## 2017-09-05 LAB
ANION GAP SERPL CALCULATED.3IONS-SCNC: 10 MMOL/L (ref 5–15)
BUN BLD-MCNC: 20 MG/DL (ref 7–21)
BUN/CREAT SERPL: 18 (ref 7–25)
CALCIUM SPEC-SCNC: 10.1 MG/DL (ref 8.4–10.2)
CHLORIDE SERPL-SCNC: 99 MMOL/L (ref 95–110)
CO2 SERPL-SCNC: 29 MMOL/L (ref 22–31)
CREAT BLD-MCNC: 1.11 MG/DL (ref 0.5–1)
GFR SERPL CREATININE-BSD FRML MDRD: 48 ML/MIN/1.73 (ref 39–90)
GLUCOSE BLD-MCNC: 92 MG/DL (ref 60–100)
HBA1C MFR BLD: 5.7 % (ref 4–5.6)
POTASSIUM BLD-SCNC: 4.4 MMOL/L (ref 3.5–5.1)
SODIUM BLD-SCNC: 138 MMOL/L (ref 137–145)

## 2017-09-05 PROCEDURE — 80048 BASIC METABOLIC PNL TOTAL CA: CPT | Performed by: GENERAL PRACTICE

## 2017-09-05 PROCEDURE — 83036 HEMOGLOBIN GLYCOSYLATED A1C: CPT | Performed by: GENERAL PRACTICE

## 2017-09-05 PROCEDURE — 99213 OFFICE O/P EST LOW 20 MIN: CPT | Performed by: GENERAL PRACTICE

## 2017-09-05 PROCEDURE — 36415 COLL VENOUS BLD VENIPUNCTURE: CPT

## 2017-09-05 RX ORDER — HYDROCODONE BITARTRATE AND ACETAMINOPHEN 5; 325 MG/1; MG/1
1 TABLET ORAL EVERY 6 HOURS PRN
Qty: 120 TABLET | Refills: 0 | Status: SHIPPED | OUTPATIENT
Start: 2017-09-05 | End: 2017-11-14 | Stop reason: SDUPTHER

## 2017-09-05 NOTE — PROGRESS NOTES
Subjective   Jaleesa Nam is a 72 y.o. female.    For review and evaluation of management of chronic degenerative disc disease and osteoarthritis with pain in multiple joints. Has had multiple joint replacements.Pain is controlled with meds and no side effects. Needs refills.   Back Pain   This is a chronic problem. The current episode started more than 1 year ago. The problem occurs constantly. The problem is unchanged. The pain is present in the lumbar spine. The pain does not radiate. The pain is at a severity of 5/10. The pain is moderate. The pain is worse during the day. The symptoms are aggravated by bending, position, twisting and standing. Pertinent negatives include no dysuria. She has tried analgesics for the symptoms. The treatment provided significant relief.   Arthritis   Presents for follow-up visit. She complains of pain, stiffness and joint warmth. The symptoms have been stable. Affected locations include the right shoulder, left shoulder, left MCP, right MCP, left knee, right knee, right hip and left hip. Associated symptoms include pain at night. Pertinent negatives include no diarrhea, dysuria or Raynaud's syndrome. Compliance with total regimen is %. Compliance with medications is %.      The following portions of the patient's history were reviewed and updated as appropriate: allergies, current medications, past social history and problem list.    Review of Systems   Constitutional: Negative.  Negative for activity change, appetite change and unexpected weight change.   HENT: Negative.  Negative for ear pain, hearing loss, nosebleeds, rhinorrhea, sinus pressure, sneezing, tinnitus and trouble swallowing.    Eyes: Negative.  Negative for pain, discharge, redness, itching and visual disturbance.   Respiratory: Negative.  Negative for apnea, chest tightness and wheezing.    Cardiovascular: Negative.  Negative for leg swelling.   Gastrointestinal: Negative.  Negative for  "abdominal distention, constipation and diarrhea.   Endocrine: Negative.    Genitourinary: Negative.  Negative for dysuria, frequency and urgency.   Musculoskeletal: Positive for arthritis, back pain and stiffness. Negative for gait problem and neck stiffness.   Skin: Negative.  Negative for color change.   Allergic/Immunologic: Negative.    Neurological: Negative.  Negative for dizziness and light-headedness.   Hematological: Negative.  Negative for adenopathy.   Psychiatric/Behavioral: Negative.  Negative for dysphoric mood and sleep disturbance. The patient is not nervous/anxious.      Objective   Visit Vitals   • /78   • Pulse 67   • Ht 66\" (167.6 cm)   • Wt 169 lb 9.6 oz (76.9 kg)   • SpO2 99%   • BMI 27.37 kg/m2      Physical Exam   Constitutional: She is oriented to person, place, and time. She appears well-developed and well-nourished. No distress.   HENT:   Head: Normocephalic and atraumatic.   Nose: Nose normal.   Mouth/Throat: Oropharynx is clear and moist.   Eyes: Conjunctivae and EOM are normal. Pupils are equal, round, and reactive to light. Right eye exhibits no discharge. Left eye exhibits no discharge.   Neck: No thyromegaly present.   Cardiovascular: Normal rate, regular rhythm, normal heart sounds and intact distal pulses.    Pulmonary/Chest: Effort normal and breath sounds normal.   Musculoskeletal:        Right shoulder: She exhibits tenderness.        Left shoulder: She exhibits tenderness.        Left knee: Tenderness found.        Lumbar back: She exhibits decreased range of motion and tenderness.   Lymphadenopathy:     She has no cervical adenopathy.   Neurological: She is alert and oriented to person, place, and time.   Skin: Skin is warm and dry.   Psychiatric: She has a normal mood and affect.   Nursing note and vitals reviewed.    Assessment/Plan   Problem List Items Addressed This Visit        Musculoskeletal and Integument    Degenerative joint disease involving multiple joints on " both sides of body - Primary (Chronic)      Other Visit Diagnoses     Degenerative disc disease, lumbar            Ricki reviewed and appropriate. Not recommended to drive or operate heavy equipment while taking potentially sedating meds. Patient understands the risks associated with this controlled medication, including tolerance and addiction. They also agree to obtain this medication only from me, and not from a another provider, unless that provider is covering for me in my absence. They also agree to be compliant in dosing, and not self adjust the dose of medication.  A signed controlled substance agreement is on file, and they have received a controlled substance education sheet at this or a previous visit. They have also signed a consent for treatment with a controlled substance as per [facility] policy.      New Medications Ordered This Visit   Medications   • HYDROcodone-acetaminophen (NORCO) 5-325 MG per tablet     Sig: Take 1 tablet by mouth Every 6 (Six) Hours As Needed for Moderate Pain (4-6).     Dispense:  120 tablet     Refill:  0

## 2017-11-14 ENCOUNTER — OFFICE VISIT (OUTPATIENT)
Dept: FAMILY MEDICINE CLINIC | Facility: CLINIC | Age: 72
End: 2017-11-14

## 2017-11-14 VITALS
BODY MASS INDEX: 26.7 KG/M2 | WEIGHT: 170.1 LBS | SYSTOLIC BLOOD PRESSURE: 160 MMHG | HEIGHT: 67 IN | OXYGEN SATURATION: 97 % | DIASTOLIC BLOOD PRESSURE: 80 MMHG | HEART RATE: 70 BPM

## 2017-11-14 DIAGNOSIS — I10 ESSENTIAL HYPERTENSION: Chronic | ICD-10-CM

## 2017-11-14 DIAGNOSIS — M15.9 DEGENERATIVE JOINT DISEASE INVOLVING MULTIPLE JOINTS ON BOTH SIDES OF BODY: Primary | Chronic | ICD-10-CM

## 2017-11-14 PROCEDURE — 99213 OFFICE O/P EST LOW 20 MIN: CPT | Performed by: GENERAL PRACTICE

## 2017-11-14 RX ORDER — AMITRIPTYLINE HYDROCHLORIDE 25 MG/1
25 TABLET, FILM COATED ORAL NIGHTLY
Qty: 90 TABLET | Refills: 3 | Status: SHIPPED | OUTPATIENT
Start: 2017-11-14 | End: 2018-11-12 | Stop reason: SDUPTHER

## 2017-11-14 RX ORDER — LISINOPRIL 20 MG/1
20 TABLET ORAL DAILY
Qty: 180 TABLET | Refills: 3 | Status: SHIPPED | OUTPATIENT
Start: 2017-11-14 | End: 2017-11-28

## 2017-11-14 RX ORDER — HYDROCODONE BITARTRATE AND ACETAMINOPHEN 5; 325 MG/1; MG/1
1 TABLET ORAL EVERY 6 HOURS PRN
Qty: 120 TABLET | Refills: 0 | Status: SHIPPED | OUTPATIENT
Start: 2017-11-14 | End: 2018-01-11 | Stop reason: SDUPTHER

## 2017-11-14 RX ORDER — LISINOPRIL AND HYDROCHLOROTHIAZIDE 20; 12.5 MG/1; MG/1
1 TABLET ORAL 2 TIMES DAILY
Qty: 180 TABLET | Refills: 3 | Status: SHIPPED | OUTPATIENT
Start: 2017-11-14 | End: 2017-11-14

## 2017-11-14 NOTE — PROGRESS NOTES
Subjective   Jaleesa Nam is a 72 y.o. female.    For review and evaluation of management of chronic degenerative disc disease and osteoarthritis with pain in multiple joints. Has had multiple joint replacements.Pain is controlled with meds and no side effects. Needs refills.   Hypertension   This is a chronic problem. The current episode started more than 1 year ago. The problem is unchanged. The problem is controlled. Pertinent negatives include no neck pain, palpitations or shortness of breath. There are no associated agents to hypertension. There are no known risk factors for coronary artery disease. Past treatments include ACE inhibitors and diuretics. The current treatment provides significant improvement. There are no compliance problems.    Back Pain   This is a chronic problem. The current episode started more than 1 year ago. The problem occurs constantly. The problem is unchanged. The pain is present in the lumbar spine. The pain does not radiate. The pain is at a severity of 5/10. The pain is moderate. The pain is worse during the day. The symptoms are aggravated by bending, position, twisting and standing. Pertinent negatives include no dysuria. She has tried analgesics for the symptoms. The treatment provided significant relief.   Arthritis   Presents for follow-up visit. She complains of pain, stiffness and joint warmth. She reports no joint swelling. The symptoms have been stable. Affected locations include the right shoulder, left shoulder, left MCP, right MCP, left knee, right knee, right hip and left hip. Associated symptoms include pain at night. Pertinent negatives include no diarrhea, dysuria or Raynaud's syndrome. Compliance with total regimen is %. Compliance with medications is %.   Pain   This is a chronic problem. The current episode started more than 1 year ago. The problem occurs constantly. The problem has been unchanged. Associated symptoms include arthralgias and  myalgias. Pertinent negatives include no chills, congestion, coughing, joint swelling, nausea, neck pain, sore throat or vomiting. The symptoms are aggravated by walking. She has tried oral narcotics for the symptoms. The treatment provided significant relief.      The following portions of the patient's history were reviewed and updated as appropriate: allergies, current medications, past social history and problem list.    Outpatient Medications Prior to Visit   Medication Sig Dispense Refill   • calcium carbonate-vitamin d (CALTRATE 600+D) 600-400 MG-UNIT per tablet Take 1 tablet by mouth daily.     • amitriptyline (ELAVIL) 25 MG tablet Take 1 tablet by mouth Every Night. 90 tablet 3   • HYDROcodone-acetaminophen (NORCO) 5-325 MG per tablet Take 1 tablet by mouth Every 6 (Six) Hours As Needed for Moderate Pain (4-6). 120 tablet 0   • lisinopril (PRINIVIL,ZESTRIL) 20 MG tablet Take 1 tablet by mouth Daily. 180 tablet 3   • lisinopril-hydrochlorothiazide (PRINZIDE,ZESTORETIC) 20-12.5 MG per tablet Take 1 tablet by mouth 2 (Two) Times a Day for blood pressure. 180 tablet 3     No facility-administered medications prior to visit.        Review of Systems   Constitutional: Negative.  Negative for activity change, appetite change, chills and unexpected weight change.   HENT: Negative.  Negative for congestion, ear pain, hearing loss, nosebleeds, rhinorrhea, sinus pressure, sneezing, sore throat, tinnitus and trouble swallowing.    Eyes: Negative.  Negative for pain, discharge, redness, itching and visual disturbance.   Respiratory: Negative.  Negative for apnea, cough, chest tightness, shortness of breath and wheezing.    Cardiovascular: Negative.  Negative for palpitations and leg swelling.   Gastrointestinal: Negative.  Negative for abdominal distention, constipation, diarrhea, nausea and vomiting.   Endocrine: Negative.    Genitourinary: Negative.  Negative for dysuria, frequency and urgency.   Musculoskeletal:  "Positive for arthralgias, arthritis, back pain, myalgias and stiffness. Negative for gait problem, joint swelling, neck pain and neck stiffness.   Skin: Negative.  Negative for color change.   Allergic/Immunologic: Negative.    Neurological: Negative.  Negative for dizziness and light-headedness.   Hematological: Negative.  Negative for adenopathy.   Psychiatric/Behavioral: Negative.  Negative for dysphoric mood and sleep disturbance. The patient is not nervous/anxious.      Objective   Visit Vitals   • /80 (BP Location: Left arm, Patient Position: Sitting, Cuff Size: Adult)   • Pulse 70   • Ht 67\" (170.2 cm)   • Wt 170 lb 1.6 oz (77.2 kg)   • SpO2 97%   • BMI 26.64 kg/m2      Physical Exam   Constitutional: She is oriented to person, place, and time. She appears well-developed and well-nourished. No distress.   HENT:   Head: Normocephalic and atraumatic.   Nose: Nose normal.   Mouth/Throat: Oropharynx is clear and moist.   Eyes: Conjunctivae and EOM are normal. Pupils are equal, round, and reactive to light. Right eye exhibits no discharge. Left eye exhibits no discharge.   Neck: No thyromegaly present.   Cardiovascular: Normal rate, regular rhythm, normal heart sounds and intact distal pulses.    Pulmonary/Chest: Effort normal and breath sounds normal.   Musculoskeletal:        Right shoulder: She exhibits tenderness.        Left shoulder: She exhibits tenderness.        Left knee: Tenderness found.        Lumbar back: She exhibits decreased range of motion and tenderness.   Lymphadenopathy:     She has no cervical adenopathy.   Neurological: She is alert and oriented to person, place, and time.   Skin: Skin is warm and dry.   Psychiatric: She has a normal mood and affect.   Nursing note and vitals reviewed.    Assessment/Plan   Problem List Items Addressed This Visit        Cardiovascular and Mediastinum    Essential hypertension (Chronic)    Relevant Medications    lisinopril (PRINIVIL,ZESTRIL) 20 MG " tablet       Musculoskeletal and Integument    Degenerative joint disease involving multiple joints on both sides of body - Primary (Chronic)        Ricki reviewed and appropriate. Not recommended to drive or operate heavy equipment while taking potentially sedating meds. Patient understands the risks associated with this controlled medication, including tolerance and addiction. They also agree to obtain this medication only from me, and not from a another provider, unless that provider is covering for me in my absence. They also agree to be compliant in dosing, and not self adjust the dose of medication.  A signed controlled substance agreement is on file, and they have received a controlled substance education sheet at this or a previous visit. They have also signed a consent for treatment with a controlled substance as per [facility] policy.      New Medications Ordered This Visit   Medications   • HYDROcodone-acetaminophen (NORCO) 5-325 MG per tablet     Sig: Take 1 tablet by mouth Every 6 (Six) Hours As Needed for Moderate Pain (4-6).     Dispense:  120 tablet     Refill:  0   • amitriptyline (ELAVIL) 25 MG tablet     Sig: Take 1 tablet by mouth Every Night.     Dispense:  90 tablet     Refill:  3   • lisinopril (PRINIVIL,ZESTRIL) 20 MG tablet     Sig: Take 1 tablet by mouth 2 (Two) Times a Day.     Dispense:  180 tablet     Refill:  3     Return in about 2 months (around 1/14/2018) for Recheck.

## 2017-11-28 RX ORDER — LISINOPRIL 20 MG/1
20 TABLET ORAL 2 TIMES DAILY
Qty: 180 TABLET | Refills: 3
Start: 2017-11-28 | End: 2018-01-11 | Stop reason: DRUGHIGH

## 2018-01-11 ENCOUNTER — OFFICE VISIT (OUTPATIENT)
Dept: FAMILY MEDICINE CLINIC | Facility: CLINIC | Age: 73
End: 2018-01-11

## 2018-01-11 VITALS
SYSTOLIC BLOOD PRESSURE: 150 MMHG | OXYGEN SATURATION: 94 % | DIASTOLIC BLOOD PRESSURE: 70 MMHG | WEIGHT: 170 LBS | HEIGHT: 67 IN | HEART RATE: 77 BPM | BODY MASS INDEX: 26.68 KG/M2

## 2018-01-11 DIAGNOSIS — M15.9 DEGENERATIVE JOINT DISEASE INVOLVING MULTIPLE JOINTS ON BOTH SIDES OF BODY: Chronic | ICD-10-CM

## 2018-01-11 DIAGNOSIS — I10 ESSENTIAL HYPERTENSION: Primary | Chronic | ICD-10-CM

## 2018-01-11 DIAGNOSIS — M51.36 DEGENERATIVE DISC DISEASE, LUMBAR: ICD-10-CM

## 2018-01-11 PROCEDURE — 99214 OFFICE O/P EST MOD 30 MIN: CPT | Performed by: GENERAL PRACTICE

## 2018-01-11 RX ORDER — HYDROCODONE BITARTRATE AND ACETAMINOPHEN 5; 325 MG/1; MG/1
1 TABLET ORAL EVERY 6 HOURS PRN
Qty: 120 TABLET | Refills: 0 | Status: SHIPPED | OUTPATIENT
Start: 2018-01-11 | End: 2018-03-08 | Stop reason: SDUPTHER

## 2018-01-11 RX ORDER — AMLODIPINE BESYLATE 5 MG/1
5 TABLET ORAL DAILY
Qty: 90 TABLET | Refills: 3 | Status: SHIPPED | OUTPATIENT
Start: 2018-01-11 | End: 2019-01-24 | Stop reason: SDUPTHER

## 2018-01-11 NOTE — PROGRESS NOTES
Subjective   Jaleesa Nam is a 72 y.o. female.    For review and evaluation of management of chronic degenerative disc disease and osteoarthritis with pain in multiple joints. Has had multiple joint replacements.Pain is controlled with meds and no side effects. Needs refills.   Hypertension   This is a chronic problem. The current episode started more than 1 year ago. The problem is unchanged. The problem is uncontrolled. Pertinent negatives include no palpitations or shortness of breath. There are no associated agents to hypertension. There are no known risk factors for coronary artery disease. Past treatments include ACE inhibitors and diuretics. The current treatment provides moderate improvement. There are no compliance problems.    Back Pain   This is a chronic problem. The current episode started more than 1 year ago. The problem occurs constantly. The problem is unchanged. The pain is present in the lumbar spine. The pain does not radiate. The pain is at a severity of 5/10. The pain is moderate. The pain is worse during the day. The symptoms are aggravated by bending, position, twisting and standing. Pertinent negatives include no dysuria. She has tried analgesics for the symptoms. The treatment provided significant relief.   Arthritis   Presents for follow-up visit. She complains of pain, stiffness and joint warmth. The symptoms have been stable. Affected locations include the right shoulder, left shoulder, left MCP, right MCP, left knee, right knee, right hip and left hip. Associated symptoms include pain at night. Pertinent negatives include no diarrhea, dysuria or Raynaud's syndrome. Compliance with total regimen is %. Compliance with medications is %.      The following portions of the patient's history were reviewed and updated as appropriate: allergies, current medications, past social history and problem list.    Outpatient Medications Prior to Visit   Medication Sig Dispense  "Refill   • amitriptyline (ELAVIL) 25 MG tablet Take 1 tablet by mouth Every Night. 90 tablet 3   • calcium carbonate-vitamin d (CALTRATE 600+D) 600-400 MG-UNIT per tablet Take 1 tablet by mouth daily.     • lisinopril (PRINIVIL,ZESTRIL) 20 MG tablet Take 1 tablet by mouth Daily. 180 tablet 3   • HYDROcodone-acetaminophen (NORCO) 5-325 MG per tablet Take 1 tablet by mouth Every 6 (Six) Hours As Needed for Moderate Pain (4-6). 120 tablet 0   • lisinopril (PRINIVIL,ZESTRIL) 20 MG tablet Take 1 tablet by mouth 2 (Two) Times a Day. 180 tablet 3     No facility-administered medications prior to visit.        Review of Systems   Constitutional: Negative.  Negative for activity change, appetite change and unexpected weight change.   HENT: Negative.  Negative for ear pain, hearing loss, nosebleeds, rhinorrhea, sinus pressure, sneezing, tinnitus and trouble swallowing.    Eyes: Negative.  Negative for pain, discharge, redness, itching and visual disturbance.   Respiratory: Negative.  Negative for apnea, chest tightness, shortness of breath and wheezing.    Cardiovascular: Negative.  Negative for palpitations and leg swelling.   Gastrointestinal: Negative.  Negative for abdominal distention, constipation and diarrhea.   Endocrine: Negative.    Genitourinary: Negative.  Negative for dysuria, frequency and urgency.   Musculoskeletal: Positive for arthritis, back pain and stiffness. Negative for gait problem and neck stiffness.   Skin: Negative.  Negative for color change.   Allergic/Immunologic: Negative.    Neurological: Negative.  Negative for dizziness and light-headedness.   Hematological: Negative.  Negative for adenopathy.   Psychiatric/Behavioral: Negative.  Negative for dysphoric mood and sleep disturbance. The patient is not nervous/anxious.      Objective   Visit Vitals   • /70   • Pulse 77   • Ht 170.2 cm (67\")   • Wt 77.1 kg (170 lb)   • SpO2 94%   • BMI 26.63 kg/m2      Physical Exam   Constitutional: She is " oriented to person, place, and time. She appears well-developed and well-nourished. No distress.   HENT:   Head: Normocephalic and atraumatic.   Nose: Nose normal.   Mouth/Throat: Oropharynx is clear and moist.   Eyes: Conjunctivae and EOM are normal. Pupils are equal, round, and reactive to light. Right eye exhibits no discharge. Left eye exhibits no discharge.   Neck: No thyromegaly present.   Cardiovascular: Normal rate, regular rhythm, normal heart sounds and intact distal pulses.    Pulmonary/Chest: Effort normal and breath sounds normal.   Musculoskeletal:        Right shoulder: She exhibits tenderness.        Left shoulder: She exhibits tenderness.        Left knee: Tenderness found.        Lumbar back: She exhibits decreased range of motion and tenderness.   Lymphadenopathy:     She has no cervical adenopathy.   Neurological: She is alert and oriented to person, place, and time.   Skin: Skin is warm and dry.   Psychiatric: She has a normal mood and affect.   Nursing note and vitals reviewed.    Assessment/Plan   Problem List Items Addressed This Visit        Cardiovascular and Mediastinum    Essential hypertension - Primary (Chronic)    Relevant Medications    amLODIPine (NORVASC) 5 MG tablet       Musculoskeletal and Integument    Degenerative joint disease involving multiple joints on both sides of body (Chronic)      Other Visit Diagnoses     Degenerative disc disease, lumbar            Start amlodipine. Ricki reviewed and appropriate. Not recommended to drive or operate heavy equipment while taking potentially sedating meds. Patient understands the risks associated with this controlled medication, including tolerance and addiction. They also agree to obtain this medication only from me, and not from a another provider, unless that provider is covering for me in my absence. They also agree to be compliant in dosing, and not self adjust the dose of medication.  A signed controlled substance agreement is  on file, and they have received a controlled substance education sheet at this or a previous visit. They have also signed a consent for treatment with a controlled substance as per [facility] policy.      New Medications Ordered This Visit   Medications   • amLODIPine (NORVASC) 5 MG tablet     Sig: Take 1 tablet by mouth Daily.     Dispense:  90 tablet     Refill:  3   • HYDROcodone-acetaminophen (NORCO) 5-325 MG per tablet     Sig: Take 1 tablet by mouth Every 6 (Six) Hours As Needed for Moderate Pain (4-6).     Dispense:  120 tablet     Refill:  0     Return in about 8 weeks (around 3/8/2018) for Recheck.

## 2018-03-08 ENCOUNTER — OFFICE VISIT (OUTPATIENT)
Dept: FAMILY MEDICINE CLINIC | Facility: CLINIC | Age: 73
End: 2018-03-08

## 2018-03-08 VITALS
BODY MASS INDEX: 26.63 KG/M2 | OXYGEN SATURATION: 98 % | HEART RATE: 69 BPM | HEIGHT: 67 IN | DIASTOLIC BLOOD PRESSURE: 70 MMHG | SYSTOLIC BLOOD PRESSURE: 134 MMHG | WEIGHT: 169.7 LBS

## 2018-03-08 DIAGNOSIS — M51.36 DEGENERATIVE DISC DISEASE, LUMBAR: Chronic | ICD-10-CM

## 2018-03-08 DIAGNOSIS — M15.9 DEGENERATIVE JOINT DISEASE INVOLVING MULTIPLE JOINTS ON BOTH SIDES OF BODY: Primary | Chronic | ICD-10-CM

## 2018-03-08 DIAGNOSIS — Z12.31 ENCOUNTER FOR SCREENING MAMMOGRAM FOR MALIGNANT NEOPLASM OF BREAST: Primary | ICD-10-CM

## 2018-03-08 DIAGNOSIS — I10 ESSENTIAL HYPERTENSION: Chronic | ICD-10-CM

## 2018-03-08 PROCEDURE — 99213 OFFICE O/P EST LOW 20 MIN: CPT | Performed by: GENERAL PRACTICE

## 2018-03-08 RX ORDER — HYDROCODONE BITARTRATE AND ACETAMINOPHEN 5; 325 MG/1; MG/1
1 TABLET ORAL EVERY 6 HOURS PRN
Qty: 120 TABLET | Refills: 0 | Status: SHIPPED | OUTPATIENT
Start: 2018-03-08 | End: 2018-05-03 | Stop reason: SDUPTHER

## 2018-03-08 NOTE — PROGRESS NOTES
Subjective   Jaleesa Nam is a 73 y.o. female.    For review and evaluation of management of chronic degenerative disc disease and osteoarthritis with pain in multiple joints. Has had multiple joint replacements.Pain is controlled with meds and no side effects. Needs refills.   Hypertension   This is a chronic problem. The current episode started more than 1 year ago. The problem is unchanged. The problem is uncontrolled. Pertinent negatives include no palpitations or shortness of breath. There are no associated agents to hypertension. There are no known risk factors for coronary artery disease. Past treatments include ACE inhibitors and diuretics. The current treatment provides moderate improvement. There are no compliance problems.    Back Pain   This is a chronic problem. The current episode started more than 1 year ago. The problem occurs constantly. The problem is unchanged. The pain is present in the lumbar spine. The pain does not radiate. The pain is at a severity of 5/10. The pain is moderate. The pain is worse during the day. The symptoms are aggravated by bending, position, twisting and standing. Pertinent negatives include no dysuria. She has tried analgesics for the symptoms. The treatment provided significant relief.   Arthritis   Presents for follow-up visit. She complains of pain, stiffness and joint warmth. The symptoms have been stable. Affected locations include the right shoulder, left shoulder, left MCP, right MCP, left knee, right knee, right hip and left hip. Associated symptoms include pain at night. Pertinent negatives include no diarrhea, dysuria or Raynaud's syndrome. Compliance with total regimen is %. Compliance with medications is %.      The following portions of the patient's history were reviewed and updated as appropriate: allergies, current medications, past social history and problem list.    Outpatient Medications Prior to Visit   Medication Sig Dispense  "Refill   • amitriptyline (ELAVIL) 25 MG tablet Take 1 tablet by mouth Every Night. 90 tablet 3   • amLODIPine (NORVASC) 5 MG tablet Take 1 tablet by mouth Daily. 90 tablet 3   • calcium carbonate-vitamin d (CALTRATE 600+D) 600-400 MG-UNIT per tablet Take 1 tablet by mouth daily.     • lisinopril (PRINIVIL,ZESTRIL) 20 MG tablet Take 1 tablet by mouth Daily. 180 tablet 3   • HYDROcodone-acetaminophen (NORCO) 5-325 MG per tablet Take 1 tablet by mouth Every 6 (Six) Hours As Needed for Moderate Pain (4-6). 120 tablet 0     No facility-administered medications prior to visit.        Review of Systems   Constitutional: Negative.  Negative for activity change, appetite change and unexpected weight change.   HENT: Negative.  Negative for ear pain, hearing loss, nosebleeds, rhinorrhea, sinus pressure, sneezing, tinnitus and trouble swallowing.    Eyes: Negative.  Negative for pain, discharge, redness, itching and visual disturbance.   Respiratory: Negative.  Negative for apnea, chest tightness, shortness of breath and wheezing.    Cardiovascular: Negative.  Negative for palpitations and leg swelling.   Gastrointestinal: Negative.  Negative for abdominal distention, constipation and diarrhea.   Endocrine: Negative.    Genitourinary: Negative.  Negative for dysuria, frequency and urgency.   Musculoskeletal: Positive for arthritis, back pain and stiffness. Negative for gait problem and neck stiffness.   Skin: Negative.  Negative for color change.   Allergic/Immunologic: Negative.    Neurological: Negative.  Negative for dizziness and light-headedness.   Hematological: Negative.  Negative for adenopathy.   Psychiatric/Behavioral: Negative.  Negative for dysphoric mood and sleep disturbance. The patient is not nervous/anxious.      Objective   Visit Vitals  /70   Pulse 69   Ht 170.2 cm (67\")   Wt 77 kg (169 lb 11.2 oz)   SpO2 98%   BMI 26.58 kg/m²      Physical Exam   Constitutional: She is oriented to person, place, and " time. She appears well-developed and well-nourished. No distress.   HENT:   Head: Normocephalic and atraumatic.   Nose: Nose normal.   Mouth/Throat: Oropharynx is clear and moist.   Eyes: Conjunctivae and EOM are normal. Pupils are equal, round, and reactive to light. Right eye exhibits no discharge. Left eye exhibits no discharge.   Neck: No thyromegaly present.   Cardiovascular: Normal rate, regular rhythm, normal heart sounds and intact distal pulses.    Pulmonary/Chest: Effort normal and breath sounds normal.   Musculoskeletal:        Right shoulder: She exhibits tenderness.        Left shoulder: She exhibits tenderness.        Left knee: Tenderness found.        Lumbar back: She exhibits decreased range of motion and tenderness.   Lymphadenopathy:     She has no cervical adenopathy.   Neurological: She is alert and oriented to person, place, and time.   Skin: Skin is warm and dry.   Psychiatric: She has a normal mood and affect.   Nursing note and vitals reviewed.    Assessment/Plan   Problem List Items Addressed This Visit        Cardiovascular and Mediastinum    Essential hypertension (Chronic)       Musculoskeletal and Integument    Degenerative joint disease involving multiple joints on both sides of body - Primary (Chronic)    Degenerative disc disease, lumbar (Chronic)      Other Visit Diagnoses    None.       Ricki reviewed and appropriate. Not recommended to drive or operate heavy equipment while taking potentially sedating meds. Patient understands the risks associated with this controlled medication, including tolerance and addiction. They also agree to obtain this medication only from me, and not from a another provider, unless that provider is covering for me in my absence. They also agree to be compliant in dosing, and not self adjust the dose of medication.  A signed controlled substance agreement is on file, and they have received a controlled substance education sheet at this or a previous  visit. They have also signed a consent for treatment with a controlled substance as per [facility] policy.      New Medications Ordered This Visit   Medications   • HYDROcodone-acetaminophen (NORCO) 5-325 MG per tablet     Sig: Take 1 tablet by mouth Every 6 (Six) Hours As Needed for Moderate Pain (4-6).     Dispense:  120 tablet     Refill:  0     Return in about 8 weeks (around 5/3/2018) for Recheck.

## 2018-03-18 PROBLEM — M51.36 DEGENERATIVE DISC DISEASE, LUMBAR: Chronic | Status: ACTIVE | Noted: 2018-03-18

## 2018-05-03 ENCOUNTER — LAB (OUTPATIENT)
Dept: LAB | Facility: HOSPITAL | Age: 73
End: 2018-05-03

## 2018-05-03 ENCOUNTER — OFFICE VISIT (OUTPATIENT)
Dept: FAMILY MEDICINE CLINIC | Facility: CLINIC | Age: 73
End: 2018-05-03

## 2018-05-03 VITALS
HEIGHT: 67 IN | SYSTOLIC BLOOD PRESSURE: 124 MMHG | BODY MASS INDEX: 26.73 KG/M2 | DIASTOLIC BLOOD PRESSURE: 70 MMHG | OXYGEN SATURATION: 95 % | WEIGHT: 170.3 LBS | HEART RATE: 64 BPM

## 2018-05-03 DIAGNOSIS — R30.0 DYSURIA: ICD-10-CM

## 2018-05-03 DIAGNOSIS — R30.0 DYSURIA: Primary | ICD-10-CM

## 2018-05-03 DIAGNOSIS — M15.9 DEGENERATIVE JOINT DISEASE INVOLVING MULTIPLE JOINTS ON BOTH SIDES OF BODY: ICD-10-CM

## 2018-05-03 DIAGNOSIS — M51.36 DEGENERATIVE DISC DISEASE, LUMBAR: ICD-10-CM

## 2018-05-03 LAB
AMPHET+METHAMPHET UR QL: NEGATIVE
BACTERIA UR QL AUTO: ABNORMAL /HPF
BARBITURATES UR QL SCN: NEGATIVE
BENZODIAZ UR QL SCN: NEGATIVE
BILIRUB UR QL STRIP: NEGATIVE
CANNABINOIDS SERPL QL: NEGATIVE
CLARITY UR: ABNORMAL
COCAINE UR QL: NEGATIVE
COLOR UR: YELLOW
GLUCOSE UR STRIP-MCNC: NEGATIVE MG/DL
HGB UR QL STRIP.AUTO: NEGATIVE
HYALINE CASTS UR QL AUTO: ABNORMAL /LPF
KETONES UR QL STRIP: NEGATIVE
LEUKOCYTE ESTERASE UR QL STRIP.AUTO: ABNORMAL
METHADONE UR QL SCN: NEGATIVE
NITRITE UR QL STRIP: NEGATIVE
OPIATES UR QL: POSITIVE
OXYCODONE UR QL SCN: NEGATIVE
PH UR STRIP.AUTO: 6 [PH] (ref 5–9)
PROT UR QL STRIP: NEGATIVE
RBC # UR: ABNORMAL /HPF
REF LAB TEST METHOD: ABNORMAL
SP GR UR STRIP: 1 (ref 1–1.03)
SQUAMOUS #/AREA URNS HPF: ABNORMAL /HPF
UROBILINOGEN UR QL STRIP: ABNORMAL
WBC UR QL AUTO: ABNORMAL /HPF

## 2018-05-03 PROCEDURE — 80307 DRUG TEST PRSMV CHEM ANLYZR: CPT | Performed by: GENERAL PRACTICE

## 2018-05-03 PROCEDURE — 87186 SC STD MICRODIL/AGAR DIL: CPT

## 2018-05-03 PROCEDURE — 87086 URINE CULTURE/COLONY COUNT: CPT

## 2018-05-03 PROCEDURE — 81001 URINALYSIS AUTO W/SCOPE: CPT

## 2018-05-03 PROCEDURE — 99214 OFFICE O/P EST MOD 30 MIN: CPT | Performed by: GENERAL PRACTICE

## 2018-05-03 PROCEDURE — 87077 CULTURE AEROBIC IDENTIFY: CPT

## 2018-05-03 RX ORDER — SULFAMETHOXAZOLE AND TRIMETHOPRIM 800; 160 MG/1; MG/1
1 TABLET ORAL 2 TIMES DAILY
Qty: 14 TABLET | Refills: 0 | Status: SHIPPED | OUTPATIENT
Start: 2018-05-03 | End: 2018-07-02

## 2018-05-03 RX ORDER — PROMETHAZINE HYDROCHLORIDE 25 MG/1
25 TABLET ORAL EVERY 6 HOURS PRN
Qty: 20 TABLET | Refills: 1 | Status: SHIPPED | OUTPATIENT
Start: 2018-05-03 | End: 2018-10-26

## 2018-05-03 RX ORDER — HYDROCODONE BITARTRATE AND ACETAMINOPHEN 5; 325 MG/1; MG/1
1 TABLET ORAL EVERY 6 HOURS PRN
Qty: 120 TABLET | Refills: 0 | Status: SHIPPED | OUTPATIENT
Start: 2018-05-03 | End: 2018-07-02 | Stop reason: SDUPTHER

## 2018-05-04 NOTE — PROGRESS NOTES
Subjective   Jaleesa Nam is a 73 y.o. female.    For review and evaluation of management of chronic degenerative disc disease and osteoarthritis with pain in multiple joints. Has had multiple joint replacements.Pain is controlled with meds and no side effects. Needs refills. Thinks she has a UTI.   Back Pain   This is a chronic problem. The current episode started more than 1 year ago. The problem occurs constantly. The problem is unchanged. The pain is present in the lumbar spine. The pain does not radiate. The pain is at a severity of 5/10. The pain is moderate. The pain is worse during the day. The symptoms are aggravated by bending, position, twisting and standing. Pertinent negatives include no dysuria. She has tried analgesics for the symptoms. The treatment provided significant relief.   Arthritis   Presents for follow-up visit. She complains of pain, stiffness and joint warmth. The symptoms have been stable. Affected locations include the right shoulder, left shoulder, left MCP, right MCP, left knee, right knee, right hip and left hip. Associated symptoms include pain at night. Pertinent negatives include no diarrhea, dysuria or Raynaud's syndrome. Compliance with total regimen is %. Compliance with medications is %.   Urinary Tract Infection    This is a new problem. The current episode started in the past 7 days. The problem occurs every urination. The problem has been unchanged. The quality of the pain is described as burning. The pain is at a severity of 4/10. The pain is moderate. There has been no fever. There is no history of pyelonephritis. Pertinent negatives include no frequency or urgency. She has tried nothing for the symptoms. The treatment provided no relief.      The following portions of the patient's history were reviewed and updated as appropriate: allergies, current medications, past social history and problem list.    Outpatient Medications Prior to Visit   Medication  "Sig Dispense Refill   • amitriptyline (ELAVIL) 25 MG tablet Take 1 tablet by mouth Every Night. 90 tablet 3   • amLODIPine (NORVASC) 5 MG tablet Take 1 tablet by mouth Daily. 90 tablet 3   • calcium carbonate-vitamin d (CALTRATE 600+D) 600-400 MG-UNIT per tablet Take 1 tablet by mouth daily.     • lisinopril (PRINIVIL,ZESTRIL) 20 MG tablet Take 1 tablet by mouth Daily. 180 tablet 3   • HYDROcodone-acetaminophen (NORCO) 5-325 MG per tablet Take 1 tablet by mouth Every 6 (Six) Hours As Needed for Moderate Pain (4-6). 120 tablet 0     No facility-administered medications prior to visit.        Review of Systems   Constitutional: Negative.  Negative for activity change, appetite change and unexpected weight change.   HENT: Negative.  Negative for ear pain, hearing loss, nosebleeds, rhinorrhea, sinus pressure, sneezing, tinnitus and trouble swallowing.    Eyes: Negative.  Negative for pain, discharge, redness, itching and visual disturbance.   Respiratory: Negative.  Negative for apnea, chest tightness and wheezing.    Cardiovascular: Negative.  Negative for leg swelling.   Gastrointestinal: Negative.  Negative for abdominal distention, constipation and diarrhea.   Endocrine: Negative.    Genitourinary: Negative.  Negative for dysuria, frequency and urgency.   Musculoskeletal: Positive for arthritis, back pain and stiffness. Negative for gait problem and neck stiffness.   Skin: Negative.  Negative for color change.   Allergic/Immunologic: Negative.    Neurological: Negative.  Negative for dizziness and light-headedness.   Hematological: Negative.  Negative for adenopathy.   Psychiatric/Behavioral: Negative.  Negative for dysphoric mood and sleep disturbance. The patient is not nervous/anxious.      Objective   Visit Vitals  /70   Pulse 64   Ht 170.2 cm (67\")   Wt 77.2 kg (170 lb 4.8 oz)   SpO2 95%   BMI 26.67 kg/m²      Physical Exam   Constitutional: She is oriented to person, place, and time. She appears " well-developed and well-nourished. No distress.   HENT:   Head: Normocephalic and atraumatic.   Nose: Nose normal.   Mouth/Throat: Oropharynx is clear and moist.   Eyes: Conjunctivae and EOM are normal. Pupils are equal, round, and reactive to light. Right eye exhibits no discharge. Left eye exhibits no discharge.   Neck: No thyromegaly present.   Cardiovascular: Normal rate, regular rhythm, normal heart sounds and intact distal pulses.    Pulmonary/Chest: Effort normal and breath sounds normal.   Musculoskeletal:        Right shoulder: She exhibits tenderness.        Left shoulder: She exhibits tenderness.        Left knee: Tenderness found.        Lumbar back: She exhibits decreased range of motion and tenderness.   Lymphadenopathy:     She has no cervical adenopathy.   Neurological: She is alert and oriented to person, place, and time.   Skin: Skin is warm and dry.   Psychiatric: She has a normal mood and affect.   Nursing note and vitals reviewed.    Assessment/Plan   Problem List Items Addressed This Visit        Musculoskeletal and Integument    Degenerative joint disease involving multiple joints on both sides of body (Chronic)    Degenerative disc disease, lumbar (Chronic)    Relevant Orders    Urine Drug Screen - Urine, Clean Catch (Completed)      Other Visit Diagnoses     Dysuria    -  Primary    Relevant Orders    Urine Culture - Urine, Urine, Clean Catch (Completed)    Urinalysis With Microscopic - Urine, Clean Catch (Completed)        Will notify regarding results. Ricki reviewed and appropriate. Not recommended to drive or operate heavy equipment while taking potentially sedating meds. Patient understands the risks associated with this controlled medication, including tolerance and addiction. They also agree to obtain this medication only from me, and not from a another provider, unless that provider is covering for me in my absence. They also agree to be compliant in dosing, and not self adjust the  dose of medication.  A signed controlled substance agreement is on file, and they have received a controlled substance education sheet at this or a previous visit. They have also signed a consent for treatment with a controlled substance as per [facility] policy.      New Medications Ordered This Visit   Medications   • promethazine (PHENERGAN) 25 MG tablet     Sig: Take 1 tablet by mouth Every 6 (Six) Hours As Needed for Nausea or Vomiting.     Dispense:  20 tablet     Refill:  1   • HYDROcodone-acetaminophen (NORCO) 5-325 MG per tablet     Sig: Take 1 tablet by mouth Every 6 (Six) Hours As Needed for Moderate Pain (4-6).     Dispense:  120 tablet     Refill:  0     Return in about 8 weeks (around 6/28/2018) for Next scheduled follow up.

## 2018-05-05 LAB
BACTERIA SPEC AEROBE CULT: ABNORMAL
BACTERIA SPEC AEROBE CULT: ABNORMAL

## 2018-06-13 ENCOUNTER — TELEPHONE (OUTPATIENT)
Dept: FAMILY MEDICINE CLINIC | Facility: CLINIC | Age: 73
End: 2018-06-13

## 2018-07-02 ENCOUNTER — LAB (OUTPATIENT)
Dept: LAB | Facility: HOSPITAL | Age: 73
End: 2018-07-02

## 2018-07-02 ENCOUNTER — OFFICE VISIT (OUTPATIENT)
Dept: FAMILY MEDICINE CLINIC | Facility: CLINIC | Age: 73
End: 2018-07-02

## 2018-07-02 VITALS
HEART RATE: 68 BPM | BODY MASS INDEX: 26.02 KG/M2 | SYSTOLIC BLOOD PRESSURE: 130 MMHG | DIASTOLIC BLOOD PRESSURE: 70 MMHG | WEIGHT: 165.8 LBS | OXYGEN SATURATION: 98 % | HEIGHT: 67 IN

## 2018-07-02 DIAGNOSIS — M15.9 DEGENERATIVE JOINT DISEASE INVOLVING MULTIPLE JOINTS ON BOTH SIDES OF BODY: Chronic | ICD-10-CM

## 2018-07-02 DIAGNOSIS — M51.36 DEGENERATIVE DISC DISEASE, LUMBAR: Chronic | ICD-10-CM

## 2018-07-02 DIAGNOSIS — Z12.31 ENCOUNTER FOR SCREENING MAMMOGRAM FOR MALIGNANT NEOPLASM OF BREAST: ICD-10-CM

## 2018-07-02 DIAGNOSIS — Z00.00 MEDICARE ANNUAL WELLNESS VISIT, SUBSEQUENT: Primary | ICD-10-CM

## 2018-07-02 DIAGNOSIS — I10 ESSENTIAL HYPERTENSION: Chronic | ICD-10-CM

## 2018-07-02 DIAGNOSIS — I10 ESSENTIAL HYPERTENSION: ICD-10-CM

## 2018-07-02 LAB
ALBUMIN SERPL-MCNC: 4.5 G/DL (ref 3.4–4.8)
ALBUMIN/GLOB SERPL: 1.3 G/DL (ref 1.1–1.8)
ALP SERPL-CCNC: 106 U/L (ref 38–126)
ALT SERPL W P-5'-P-CCNC: 30 U/L (ref 9–52)
AMPHET+METHAMPHET UR QL: NEGATIVE
ANION GAP SERPL CALCULATED.3IONS-SCNC: 12 MMOL/L (ref 5–15)
ARTICHOKE IGE QN: 96 MG/DL (ref 1–129)
AST SERPL-CCNC: 50 U/L (ref 14–36)
BACTERIA UR QL AUTO: ABNORMAL /HPF
BARBITURATES UR QL SCN: NEGATIVE
BASOPHILS # BLD AUTO: 0.09 10*3/MM3 (ref 0–0.2)
BASOPHILS NFR BLD AUTO: 1.6 % (ref 0–2)
BENZODIAZ UR QL SCN: NEGATIVE
BILIRUB SERPL-MCNC: 1.1 MG/DL (ref 0.2–1.3)
BILIRUB UR QL STRIP: NEGATIVE
BUN BLD-MCNC: 22 MG/DL (ref 7–21)
BUN/CREAT SERPL: 21.8 (ref 7–25)
CALCIUM SPEC-SCNC: 9.7 MG/DL (ref 8.4–10.2)
CANNABINOIDS SERPL QL: NEGATIVE
CHLORIDE SERPL-SCNC: 104 MMOL/L (ref 95–110)
CHOLEST SERPL-MCNC: 177 MG/DL (ref 0–199)
CLARITY UR: CLEAR
CO2 SERPL-SCNC: 27 MMOL/L (ref 22–31)
COCAINE UR QL: NEGATIVE
COLOR UR: YELLOW
CREAT BLD-MCNC: 1.01 MG/DL (ref 0.5–1)
DEPRECATED RDW RBC AUTO: 41.2 FL (ref 36.4–46.3)
EOSINOPHIL # BLD AUTO: 0.22 10*3/MM3 (ref 0–0.7)
EOSINOPHIL NFR BLD AUTO: 3.9 % (ref 0–7)
ERYTHROCYTE [DISTWIDTH] IN BLOOD BY AUTOMATED COUNT: 12.4 % (ref 11.5–14.5)
GFR SERPL CREATININE-BSD FRML MDRD: 54 ML/MIN/1.73 (ref 39–90)
GLOBULIN UR ELPH-MCNC: 3.4 GM/DL (ref 2.3–3.5)
GLUCOSE BLD-MCNC: 121 MG/DL (ref 60–100)
GLUCOSE UR STRIP-MCNC: NEGATIVE MG/DL
HCT VFR BLD AUTO: 42.1 % (ref 35–45)
HDLC SERPL-MCNC: 44 MG/DL (ref 60–200)
HGB BLD-MCNC: 14.5 G/DL (ref 12–15.5)
HGB UR QL STRIP.AUTO: NEGATIVE
HYALINE CASTS UR QL AUTO: ABNORMAL /LPF
IMM GRANULOCYTES # BLD: 0.02 10*3/MM3 (ref 0–0.02)
IMM GRANULOCYTES NFR BLD: 0.4 % (ref 0–0.5)
KETONES UR QL STRIP: NEGATIVE
LDLC/HDLC SERPL: 2.09 {RATIO} (ref 0–3.22)
LEUKOCYTE ESTERASE UR QL STRIP.AUTO: ABNORMAL
LYMPHOCYTES # BLD AUTO: 2.2 10*3/MM3 (ref 0.6–4.2)
LYMPHOCYTES NFR BLD AUTO: 38.7 % (ref 10–50)
MCH RBC QN AUTO: 31.3 PG (ref 26.5–34)
MCHC RBC AUTO-ENTMCNC: 34.4 G/DL (ref 31.4–36)
MCV RBC AUTO: 90.7 FL (ref 80–98)
METHADONE UR QL SCN: NEGATIVE
MONOCYTES # BLD AUTO: 0.6 10*3/MM3 (ref 0–0.9)
MONOCYTES NFR BLD AUTO: 10.6 % (ref 0–12)
NEUTROPHILS # BLD AUTO: 2.55 10*3/MM3 (ref 2–8.6)
NEUTROPHILS NFR BLD AUTO: 44.8 % (ref 37–80)
NITRITE UR QL STRIP: NEGATIVE
OPIATES UR QL: POSITIVE
OXYCODONE UR QL SCN: NEGATIVE
PH UR STRIP.AUTO: 6 [PH] (ref 5–9)
PLATELET # BLD AUTO: 341 10*3/MM3 (ref 150–450)
PMV BLD AUTO: 9.9 FL (ref 8–12)
POTASSIUM BLD-SCNC: 4.5 MMOL/L (ref 3.5–5.1)
PROT SERPL-MCNC: 7.9 G/DL (ref 6.3–8.6)
PROT UR QL STRIP: NEGATIVE
RBC # BLD AUTO: 4.64 10*6/MM3 (ref 3.77–5.16)
RBC # UR: ABNORMAL /HPF
REF LAB TEST METHOD: ABNORMAL
SODIUM BLD-SCNC: 143 MMOL/L (ref 137–145)
SP GR UR STRIP: 1.02 (ref 1–1.03)
SQUAMOUS #/AREA URNS HPF: ABNORMAL /HPF
TRIGL SERPL-MCNC: 205 MG/DL (ref 20–199)
UROBILINOGEN UR QL STRIP: ABNORMAL
WBC NRBC COR # BLD: 5.68 10*3/MM3 (ref 3.2–9.8)
WBC UR QL AUTO: ABNORMAL /HPF

## 2018-07-02 PROCEDURE — 99214 OFFICE O/P EST MOD 30 MIN: CPT | Performed by: GENERAL PRACTICE

## 2018-07-02 PROCEDURE — 80061 LIPID PANEL: CPT

## 2018-07-02 PROCEDURE — 36415 COLL VENOUS BLD VENIPUNCTURE: CPT

## 2018-07-02 PROCEDURE — 80053 COMPREHEN METABOLIC PANEL: CPT

## 2018-07-02 PROCEDURE — 80307 DRUG TEST PRSMV CHEM ANLYZR: CPT | Performed by: GENERAL PRACTICE

## 2018-07-02 PROCEDURE — 85025 COMPLETE CBC W/AUTO DIFF WBC: CPT

## 2018-07-02 PROCEDURE — 81001 URINALYSIS AUTO W/SCOPE: CPT

## 2018-07-02 PROCEDURE — G0439 PPPS, SUBSEQ VISIT: HCPCS | Performed by: GENERAL PRACTICE

## 2018-07-02 RX ORDER — HYDROCODONE BITARTRATE AND ACETAMINOPHEN 5; 325 MG/1; MG/1
1 TABLET ORAL EVERY 6 HOURS PRN
Qty: 120 TABLET | Refills: 0 | Status: SHIPPED | OUTPATIENT
Start: 2018-07-02 | End: 2018-08-31 | Stop reason: SDUPTHER

## 2018-07-02 NOTE — PROGRESS NOTES
QUICK REFERENCE INFORMATION:  The ABCs of the Annual Wellness Visit    Subsequent Medicare Wellness Visit    HEALTH RISK ASSESSMENT    1945    Recent Hospitalizations:  No hospitalization(s) within the last year..        Current Medical Providers:  Patient Care Team:  Emi Wright MD as PCP - General  Emi Wright MD as PCP - Claims Attributed  Reji Simpson MD (Inactive) as Consulting Physician (Ophthalmology)  Shabbir Engel DMD as Consulting Physician (Dental General Practice)        Smoking Status:  History   Smoking Status   • Never Smoker   Smokeless Tobacco   • Never Used       Alcohol Consumption:  History   Alcohol use Not on file       Depression Screen:   PHQ-2/PHQ-9 Depression Screening 7/2/2018   Little interest or pleasure in doing things 0   Feeling down, depressed, or hopeless 0   Trouble falling or staying asleep, or sleeping too much 0   Feeling tired or having little energy 0   Poor appetite or overeating 0   Feeling bad about yourself - or that you are a failure or have let yourself or your family down 0   Trouble concentrating on things, such as reading the newspaper or watching television 0   Moving or speaking so slowly that other people could have noticed. Or the opposite - being so fidgety or restless that you have been moving around a lot more than usual 1   Thoughts that you would be better off dead, or of hurting yourself in some way 0   Total Score 1       Health Habits and Functional and Cognitive Screening:  Functional & Cognitive Status 7/2/2018   Do you have difficulty preparing food and eating? No   Do you have difficulty bathing yourself, getting dressed or grooming yourself? No   Do you have difficulty using the toilet? No   Do you have difficulty moving around from place to place? No   Do you have trouble with steps or getting out of a bed or a chair? No   In the past year have you fallen or experienced a near fall? Yes   Current Diet Well Balanced Diet    Dental Exam Up to date   Eye Exam Up to date   Exercise (times per week) 3 times per week   Current Exercise Activities Include Walking   Do you need help using the phone?  No   Are you deaf or do you have serious difficulty hearing?  Yes   Do you need help with transportation? No   Do you need help shopping? No   Do you need help preparing meals?  No   Do you need help with housework?  No   Do you need help with laundry? No   Do you need help taking your medications? No   Do you need help managing money? No   Do you ever drive or ride in a car without wearing a seat belt? No   Have you felt unusual stress, anger or loneliness in the last month? No   Who do you live with? Spouse   If you need help, do you have trouble finding someone available to you? No   Have you been bothered in the last four weeks by sexual problems? No           Does the patient have evidence of cognitive impairment? No    Aspirin use counseling: Does not need ASA (and currently is not on it)      Recent Lab Results:  CMP:  Lab Results   Component Value Date    BUN 22 (H) 07/02/2018    CREATININE 1.01 (H) 07/02/2018    EGFRIFNONA 54 07/02/2018    BCR 21.8 07/02/2018     07/02/2018    K 4.5 07/02/2018    CO2 27.0 07/02/2018    CALCIUM 9.7 07/02/2018    ALBUMIN 4.50 07/02/2018    LABIL2 1.3 07/02/2018    BILITOT 1.1 07/02/2018    ALKPHOS 106 07/02/2018    AST 50 (H) 07/02/2018    ALT 30 07/02/2018     Lipid Panel:  Lab Results   Component Value Date    CHOL 177 07/02/2018    TRIG 205 (H) 07/02/2018    HDL 44 (L) 07/02/2018    LDLHDL 2.09 07/02/2018     HbA1c:  Lab Results   Component Value Date    HGBA1C 5.7 (H) 09/05/2017       Visual Acuity:  No exam data present    Age-appropriate Screening Schedule:  Refer to the list below for future screening recommendations based on patient's age, sex and/or medical conditions. Orders for these recommended tests are listed in the plan section. The patient has been provided with a written  plan.    Health Maintenance   Topic Date Due   • ZOSTER VACCINE (2 of 2) 08/07/2017   • LIPID PANEL  06/12/2018   • INFLUENZA VACCINE  08/01/2018   • COLONOSCOPY  10/07/2018   • MAMMOGRAM  06/12/2019   • TDAP/TD VACCINES (2 - Td) 06/12/2027   • PNEUMOCOCCAL VACCINES (65+ LOW/MEDIUM RISK)  Completed        Subjective   History of Present Illness    Jaleesa Nam is a 73 y.o. female who presents for an Subsequent Wellness Visit.    The following portions of the patient's history were reviewed and updated as appropriate: allergies, current medications, past family history, past medical history, past social history, past surgical history and problem list.    Outpatient Medications Prior to Visit   Medication Sig Dispense Refill   • amitriptyline (ELAVIL) 25 MG tablet Take 1 tablet by mouth Every Night. 90 tablet 3   • amLODIPine (NORVASC) 5 MG tablet Take 1 tablet by mouth Daily. 90 tablet 3   • calcium carbonate-vitamin d (CALTRATE 600+D) 600-400 MG-UNIT per tablet Take 1 tablet by mouth daily.     • lisinopril (PRINIVIL,ZESTRIL) 20 MG tablet Take 1 tablet by mouth Daily. 180 tablet 3   • promethazine (PHENERGAN) 25 MG tablet Take 1 tablet by mouth Every 6 (Six) Hours As Needed for Nausea or Vomiting. 20 tablet 1   • HYDROcodone-acetaminophen (NORCO) 5-325 MG per tablet Take 1 tablet by mouth Every 6 (Six) Hours As Needed for Moderate Pain (4-6). 120 tablet 0   • sulfamethoxazole-trimethoprim (BACTRIM DS,SEPTRA DS) 800-160 MG per tablet Take 1 tablet by mouth 2 (Two) Times a Day. 14 tablet 0     No facility-administered medications prior to visit.        Patient Active Problem List   Diagnosis   • Degenerative joint disease involving multiple joints on both sides of body   • Essential hypertension   • Degenerative disc disease, lumbar       Advance Care Planning:  power of  for healthcare on file, has an advance directive - a copy has been provided and is in file    Identification of Risk Factors:  Risk  "factors include: weight , unhealthy diet, inactivity, increased fall risk and chronic pain.    Review of Systems    Compared to one year ago, the patient feels her physical health is the same.  Compared to one year ago, the patient feels her mental health is the same.    Objective     Physical Exam   Constitutional: She is oriented to person, place, and time. She appears well-developed and well-nourished. No distress.   HENT:   Head: Normocephalic and atraumatic.   Nose: Nose normal.   Mouth/Throat: Oropharynx is clear and moist.   Eyes: Conjunctivae and EOM are normal. Pupils are equal, round, and reactive to light. Right eye exhibits no discharge. Left eye exhibits no discharge.   Neck: No thyromegaly present.   Cardiovascular: Normal rate, regular rhythm, normal heart sounds and intact distal pulses.    Pulmonary/Chest: Effort normal and breath sounds normal.   Lymphadenopathy:     She has no cervical adenopathy.   Neurological: She is alert and oriented to person, place, and time.   Skin: Skin is warm and dry.   Psychiatric: She has a normal mood and affect.   Nursing note and vitals reviewed.      Vitals:    07/02/18 0918   BP: 130/70   Pulse: 68   SpO2: 98%   Weight: 75.2 kg (165 lb 12.8 oz)   Height: 170.2 cm (67\")   PainSc:   4   PainLoc: Back       Patient's Body mass index is 25.97 kg/m². BMI is within normal parameters. No follow-up required.      Assessment/Plan   Patient Self-Management and Personalized Health Advice  The patient has been provided with information about: diet, exercise, weight management and fall prevention and preventive services including:   · Exercise counseling provided, Fall Risk assessment done, Fall Risk plan of care done, Zostavax vaccine (Herpes Zoster).    Visit Diagnoses:    ICD-10-CM ICD-9-CM   1. Medicare annual wellness visit, subsequent Z00.00 V70.0   2. Degenerative disc disease, lumbar M51.36 722.52   3. Essential hypertension I10 401.9   4. Degenerative joint disease " involving multiple joints on both sides of body M15.9 715.89   5. Encounter for screening mammogram for malignant neoplasm of breast Z12.31 V76.12       Orders Placed This Encounter   Procedures   • Comprehensive Metabolic Panel     Standing Status:   Future     Number of Occurrences:   1     Standing Expiration Date:   7/2/2019   • Lipid Panel     Standing Status:   Future     Number of Occurrences:   1     Standing Expiration Date:   7/2/2019   • Urine Drug Screen - Urine, Clean Catch   • CBC & Differential     Standing Status:   Future     Number of Occurrences:   1     Standing Expiration Date:   7/2/2019     Order Specific Question:   Manual Differential     Answer:   No   • Urinalysis With Microscopic - Urine, Clean Catch     Standing Status:   Future     Number of Occurrences:   1     Standing Expiration Date:   7/2/2019       Outpatient Encounter Prescriptions as of 7/2/2018   Medication Sig Dispense Refill   • amitriptyline (ELAVIL) 25 MG tablet Take 1 tablet by mouth Every Night. 90 tablet 3   • amLODIPine (NORVASC) 5 MG tablet Take 1 tablet by mouth Daily. 90 tablet 3   • calcium carbonate-vitamin d (CALTRATE 600+D) 600-400 MG-UNIT per tablet Take 1 tablet by mouth daily.     • HYDROcodone-acetaminophen (NORCO) 5-325 MG per tablet Take 1 tablet by mouth Every 6 (Six) Hours As Needed for Moderate Pain (4-6). 120 tablet 0   • lisinopril (PRINIVIL,ZESTRIL) 20 MG tablet Take 1 tablet by mouth Daily. 180 tablet 3   • promethazine (PHENERGAN) 25 MG tablet Take 1 tablet by mouth Every 6 (Six) Hours As Needed for Nausea or Vomiting. 20 tablet 1   • [DISCONTINUED] HYDROcodone-acetaminophen (NORCO) 5-325 MG per tablet Take 1 tablet by mouth Every 6 (Six) Hours As Needed for Moderate Pain (4-6). 120 tablet 0   • [DISCONTINUED] sulfamethoxazole-trimethoprim (BACTRIM DS,SEPTRA DS) 800-160 MG per tablet Take 1 tablet by mouth 2 (Two) Times a Day. 14 tablet 0     No facility-administered encounter medications on file as  of 7/2/2018.        Reviewed use of high risk medication in the elderly: yes  Reviewed for potential of harmful drug interactions in the elderly: not applicable    Follow Up:  Return in about 2 months (around 9/2/2018) for Recheck.     An After Visit Summary and PPPS with all of these plans were given to the patient.

## 2018-07-02 NOTE — PATIENT INSTRUCTIONS
Check at pharmacy on Shingrix shingles vaccine      Medicare Wellness  Personal Prevention Plan of Service     Date of Office Visit:  2018  Encounter Provider:  Emi Wright MD  Place of Service:  [facility]  Patient Name: Jaleesa Nam  :  1945    As part of the Medicare Wellness portion of your visit today, we are providing you with this personalized preventive plan of services (PPPS). This plan is based upon recommendations of the [place] Preventive Services Task Force (USPSTF) and the Advisory Committee on Immunization Practices (ACIP).    This lists the preventive care services that should be considered, and provides dates of when you are due. Items listed as completed are up-to-date and do not require any further intervention.    Health Maintenance   Topic Date Due   • ZOSTER VACCINE (2 of 2) 2017   • LIPID PANEL  2018   • INFLUENZA VACCINE  2018   • COLONOSCOPY  10/07/2018   • MAMMOGRAM  2019   • MEDICARE ANNUAL WELLNESS  2019   • TDAP/TD VACCINES (2 - Td) 2027   • HEPATITIS C SCREENING  Completed   • PNEUMOCOCCAL VACCINES (65+ LOW/MEDIUM RISK)  Completed       No orders of the defined types were placed in this encounter.      No Follow-up on file.

## 2018-07-02 NOTE — PROGRESS NOTES
Subjective   Jaleesa Nam is a 73 y.o. female.     Chief Complaint   Patient presents with   • Annual Exam     labs [facility] medicare wellness   • Back Pain   • Med Refill   • Hypertension     For review and evaluation of management of chronic medical problems. Labs pending. Due for mammogram.    Back Pain   This is a chronic problem. The current episode started more than 1 year ago. The problem occurs constantly. The problem is unchanged. The pain is present in the lumbar spine. The pain does not radiate. The pain is at a severity of 5/10. The pain is moderate. The pain is worse during the day. The symptoms are aggravated by bending, position, twisting and standing. Pertinent negatives include no abdominal pain, chest pain, dysuria, fever, headaches, numbness or weakness. She has tried analgesics for the symptoms. The treatment provided significant relief.   Hypertension   This is a chronic problem. The current episode started more than 1 year ago. The problem is unchanged. The problem is controlled. Pertinent negatives include no chest pain, headaches, neck pain, palpitations or shortness of breath. There are no associated agents to hypertension. There are no known risk factors for coronary artery disease. Current antihypertension treatment includes ACE inhibitors and diuretics. The current treatment provides significant improvement. There are no compliance problems.    Pain   This is a chronic problem. The current episode started more than 1 year ago. The problem occurs constantly. The problem has been unchanged. Pertinent negatives include no abdominal pain, arthralgias, chest pain, chills, congestion, coughing, fatigue, fever, headaches, joint swelling, myalgias, nausea, neck pain, numbness, rash, sore throat, vomiting or weakness. The symptoms are aggravated by walking. She has tried oral narcotics for the symptoms. The treatment provided significant relief.   Arthritis   Presents for follow-up visit. She  complains of pain, stiffness and joint warmth. She reports no joint swelling. The symptoms have been stable. Affected locations include the right shoulder, left shoulder, left MCP, right MCP, left knee, right knee, right hip and left hip. Associated symptoms include pain at night. Pertinent negatives include no diarrhea, dysuria, fatigue, fever, rash or Raynaud's syndrome. Compliance with total regimen is %. Compliance with medications is %.        The following portions of the patient's history were reviewed and updated as appropriate: allergies, current medications, past family and social history and problem list.    Outpatient Medications Prior to Visit   Medication Sig Dispense Refill   • amitriptyline (ELAVIL) 25 MG tablet Take 1 tablet by mouth Every Night. 90 tablet 3   • amLODIPine (NORVASC) 5 MG tablet Take 1 tablet by mouth Daily. 90 tablet 3   • calcium carbonate-vitamin d (CALTRATE 600+D) 600-400 MG-UNIT per tablet Take 1 tablet by mouth daily.     • lisinopril (PRINIVIL,ZESTRIL) 20 MG tablet Take 1 tablet by mouth Daily. 180 tablet 3   • promethazine (PHENERGAN) 25 MG tablet Take 1 tablet by mouth Every 6 (Six) Hours As Needed for Nausea or Vomiting. 20 tablet 1   • HYDROcodone-acetaminophen (NORCO) 5-325 MG per tablet Take 1 tablet by mouth Every 6 (Six) Hours As Needed for Moderate Pain (4-6). 120 tablet 0   • sulfamethoxazole-trimethoprim (BACTRIM DS,SEPTRA DS) 800-160 MG per tablet Take 1 tablet by mouth 2 (Two) Times a Day. 14 tablet 0     No facility-administered medications prior to visit.        Review of Systems   Constitutional: Negative.  Negative for chills, fatigue, fever and unexpected weight change.   HENT: Negative.  Negative for congestion, ear pain, hearing loss, nosebleeds, rhinorrhea, sneezing, sore throat and tinnitus.    Eyes: Negative.  Negative for discharge.   Respiratory: Negative.  Negative for cough, shortness of breath and wheezing.    Cardiovascular: Negative.   "Negative for chest pain and palpitations.   Gastrointestinal: Negative.  Negative for abdominal pain, constipation, diarrhea, nausea and vomiting.   Endocrine: Negative.    Genitourinary: Negative.  Negative for dysuria, frequency and urgency.   Musculoskeletal: Positive for arthritis, back pain and stiffness. Negative for arthralgias, joint swelling, myalgias and neck pain.   Skin: Negative.  Negative for rash.   Allergic/Immunologic: Negative.    Neurological: Negative.  Negative for dizziness, weakness, numbness and headaches.   Hematological: Negative.  Negative for adenopathy.   Psychiatric/Behavioral: Negative.  Negative for dysphoric mood and sleep disturbance. The patient is not nervous/anxious.        Objective     Visit Vitals  /70   Pulse 68   Ht 170.2 cm (67\")   Wt 75.2 kg (165 lb 12.8 oz)   SpO2 98%   BMI 25.97 kg/m²     Physical Exam   Constitutional: She is oriented to person, place, and time. She appears well-developed and well-nourished. No distress.   HENT:   Head: Normocephalic and atraumatic.   Nose: Nose normal.   Mouth/Throat: Oropharynx is clear and moist.   Eyes: Conjunctivae and EOM are normal. Pupils are equal, round, and reactive to light. Right eye exhibits no discharge. Left eye exhibits no discharge.   Neck: No tracheal deviation present. No thyromegaly present.   Cardiovascular: Normal rate, regular rhythm, normal heart sounds and intact distal pulses.    No murmur heard.  Pulmonary/Chest: Effort normal and breath sounds normal. No respiratory distress. She has no wheezes. She has no rales. She exhibits no tenderness. Right breast exhibits no inverted nipple, no mass, no nipple discharge, no skin change and no tenderness. Left breast exhibits no inverted nipple, no mass, no nipple discharge, no skin change and no tenderness.   Abdominal: Soft. Bowel sounds are normal. She exhibits no distension and no mass. There is no tenderness. No hernia.   Musculoskeletal: Normal range of " motion. She exhibits no deformity.   Lymphadenopathy:     She has no cervical adenopathy.   Neurological: She is alert and oriented to person, place, and time. She has normal reflexes.   Skin: Skin is warm and dry.   Psychiatric: She has a normal mood and affect. Her behavior is normal. Judgment and thought content normal.   Nursing note and vitals reviewed.       Assessment/Plan   Problem List Items Addressed This Visit        Cardiovascular and Mediastinum    Essential hypertension (Chronic)    Relevant Orders    Comprehensive Metabolic Panel (Completed)    CBC & Differential (Completed)    Lipid Panel (Completed)    Urinalysis With Microscopic - Urine, Clean Catch (Completed)       Musculoskeletal and Integument    Degenerative joint disease involving multiple joints on both sides of body (Chronic)    Relevant Medications    HYDROcodone-acetaminophen (NORCO) 5-325 MG per tablet    Degenerative disc disease, lumbar (Chronic)    Relevant Medications    HYDROcodone-acetaminophen (NORCO) 5-325 MG per tablet    Other Relevant Orders    Urine Drug Screen - Urine, Clean Catch (Completed)      Other Visit Diagnoses     Medicare annual wellness visit, subsequent    -  Primary    Encounter for screening mammogram for malignant neoplasm of breast              Will notify regarding results. Ricki reviewed and appropriate. Not recommended to drive or operate heavy equipment while taking potentially sedating meds.  Patient understands the risks associated with this controlled medication, including tolerance and addiction. They also agree to obtain this medication only from me, and not from a another provider, unless that provider is covering for me in my absence. They also agree to be compliant in dosing, and not self adjust the dose of medication.  A signed controlled substance agreement is on file, and they have received a controlled substance education sheet at this or a previous visit. They have also signed a consent for  treatment with a controlled substance as per [facility] policy.      New Medications Ordered This Visit   Medications   • HYDROcodone-acetaminophen (NORCO) 5-325 MG per tablet     Sig: Take 1 tablet by mouth Every 6 (Six) Hours As Needed for Moderate Pain (4-6).     Dispense:  120 tablet     Refill:  0     Return in about 2 months (around 9/2/2018) for Recheck.

## 2018-07-10 ENCOUNTER — TELEPHONE (OUTPATIENT)
Dept: FAMILY MEDICINE CLINIC | Facility: CLINIC | Age: 73
End: 2018-07-10

## 2018-07-10 NOTE — TELEPHONE ENCOUNTER
-Pr Dr. Wright, Ms. Nam  has been called with her recent lab results & recommendations.  Continue her current medications and follow-up as planned or sooner if any problems.      ---- Message from Emi Wright MD sent at 7/6/2018  5:06 PM CDT -----  Call and tell labs are okay except her sugar is a little high, needs to come back sweets and carbs in her diet.  Also her kidney function is up just a little bit she needs to make sure she is drinking enough liquids in the day.

## 2018-08-31 ENCOUNTER — OFFICE VISIT (OUTPATIENT)
Dept: FAMILY MEDICINE CLINIC | Facility: CLINIC | Age: 73
End: 2018-08-31

## 2018-08-31 VITALS
HEIGHT: 67 IN | WEIGHT: 166.7 LBS | BODY MASS INDEX: 26.16 KG/M2 | DIASTOLIC BLOOD PRESSURE: 70 MMHG | SYSTOLIC BLOOD PRESSURE: 120 MMHG | OXYGEN SATURATION: 95 % | HEART RATE: 66 BPM

## 2018-08-31 DIAGNOSIS — M51.36 DEGENERATIVE DISC DISEASE, LUMBAR: Chronic | ICD-10-CM

## 2018-08-31 DIAGNOSIS — R30.0 DYSURIA: ICD-10-CM

## 2018-08-31 DIAGNOSIS — M15.9 DEGENERATIVE JOINT DISEASE INVOLVING MULTIPLE JOINTS ON BOTH SIDES OF BODY: Primary | Chronic | ICD-10-CM

## 2018-08-31 PROCEDURE — 99213 OFFICE O/P EST LOW 20 MIN: CPT | Performed by: GENERAL PRACTICE

## 2018-08-31 RX ORDER — HYDROCODONE BITARTRATE AND ACETAMINOPHEN 5; 325 MG/1; MG/1
1 TABLET ORAL EVERY 6 HOURS PRN
Qty: 120 TABLET | Refills: 0 | Status: SHIPPED | OUTPATIENT
Start: 2018-08-31 | End: 2018-10-26 | Stop reason: SDUPTHER

## 2018-09-07 RX ORDER — LISINOPRIL 20 MG/1
TABLET ORAL
Qty: 90 TABLET | Refills: 3 | Status: SHIPPED | OUTPATIENT
Start: 2018-09-07 | End: 2019-05-31 | Stop reason: SDUPTHER

## 2018-09-13 ENCOUNTER — LAB (OUTPATIENT)
Dept: LAB | Facility: HOSPITAL | Age: 73
End: 2018-09-13

## 2018-09-13 DIAGNOSIS — R30.0 DYSURIA: ICD-10-CM

## 2018-09-13 LAB
BACTERIA UR QL AUTO: ABNORMAL /HPF
BILIRUB UR QL STRIP: NEGATIVE
CLARITY UR: CLEAR
COLOR UR: YELLOW
GLUCOSE UR STRIP-MCNC: NEGATIVE MG/DL
HGB UR QL STRIP.AUTO: NEGATIVE
HYALINE CASTS UR QL AUTO: ABNORMAL /LPF
KETONES UR QL STRIP: NEGATIVE
LEUKOCYTE ESTERASE UR QL STRIP.AUTO: ABNORMAL
NITRITE UR QL STRIP: NEGATIVE
PH UR STRIP.AUTO: 5.5 [PH] (ref 5–9)
PROT UR QL STRIP: NEGATIVE
RBC # UR: ABNORMAL /HPF
REF LAB TEST METHOD: ABNORMAL
SP GR UR STRIP: 1.02 (ref 1–1.03)
SQUAMOUS #/AREA URNS HPF: ABNORMAL /HPF
UROBILINOGEN UR QL STRIP: ABNORMAL
WBC UR QL AUTO: ABNORMAL /HPF

## 2018-09-13 PROCEDURE — 81001 URINALYSIS AUTO W/SCOPE: CPT

## 2018-09-13 PROCEDURE — 87086 URINE CULTURE/COLONY COUNT: CPT

## 2018-09-13 PROCEDURE — 87077 CULTURE AEROBIC IDENTIFY: CPT

## 2018-09-13 PROCEDURE — 87186 SC STD MICRODIL/AGAR DIL: CPT

## 2018-09-14 ENCOUNTER — TELEPHONE (OUTPATIENT)
Dept: FAMILY MEDICINE CLINIC | Facility: CLINIC | Age: 73
End: 2018-09-14

## 2018-09-14 RX ORDER — SULFAMETHOXAZOLE AND TRIMETHOPRIM 800; 160 MG/1; MG/1
1 TABLET ORAL 2 TIMES DAILY
Qty: 14 TABLET | Refills: 0 | Status: SHIPPED | OUTPATIENT
Start: 2018-09-14 | End: 2018-10-26

## 2018-09-14 NOTE — PROGRESS NOTES
Pr Dr. Wright, Ms. Amos  has been called with her recent lab results & recommendations.  Continue her current medications and follow-up as planned or sooner if any problems.    Script called to [facility] in [place]

## 2018-09-14 NOTE — TELEPHONE ENCOUNTER
Pr Dr. Wright, Ms. Amos  has been called with her recent lab results & recommendations.  Continue her current medications and follow-up as planned or sooner if any problems.    Script called to [facility] in [place]      ----- Message from Emi Wright MD sent at 9/14/2018  8:23 AM CDT -----  Call and tell has UTI, send prescription for bactrim ds bid for 7 days

## 2018-09-15 LAB — BACTERIA SPEC AEROBE CULT: ABNORMAL

## 2018-10-26 ENCOUNTER — OFFICE VISIT (OUTPATIENT)
Dept: FAMILY MEDICINE CLINIC | Facility: CLINIC | Age: 73
End: 2018-10-26

## 2018-10-26 VITALS
BODY MASS INDEX: 26.37 KG/M2 | HEART RATE: 60 BPM | WEIGHT: 168 LBS | DIASTOLIC BLOOD PRESSURE: 70 MMHG | OXYGEN SATURATION: 98 % | SYSTOLIC BLOOD PRESSURE: 110 MMHG | HEIGHT: 67 IN

## 2018-10-26 DIAGNOSIS — M15.9 DEGENERATIVE JOINT DISEASE INVOLVING MULTIPLE JOINTS ON BOTH SIDES OF BODY: Chronic | ICD-10-CM

## 2018-10-26 DIAGNOSIS — Z23 NEED FOR INFLUENZA VACCINATION: ICD-10-CM

## 2018-10-26 DIAGNOSIS — M51.36 DEGENERATIVE DISC DISEASE, LUMBAR: Primary | Chronic | ICD-10-CM

## 2018-10-26 PROCEDURE — G0008 ADMIN INFLUENZA VIRUS VAC: HCPCS | Performed by: GENERAL PRACTICE

## 2018-10-26 PROCEDURE — 90662 IIV NO PRSV INCREASED AG IM: CPT | Performed by: GENERAL PRACTICE

## 2018-10-26 PROCEDURE — 99213 OFFICE O/P EST LOW 20 MIN: CPT | Performed by: GENERAL PRACTICE

## 2018-10-26 RX ORDER — HYDROCODONE BITARTRATE AND ACETAMINOPHEN 5; 325 MG/1; MG/1
1 TABLET ORAL EVERY 6 HOURS PRN
Qty: 120 TABLET | Refills: 0 | Status: SHIPPED | OUTPATIENT
Start: 2018-10-26 | End: 2018-12-19 | Stop reason: SDUPTHER

## 2018-10-26 NOTE — PROGRESS NOTES
Subjective   Jaleesa Nam is a 73 y.o. female.    For review and evaluation of management of chronic degenerative disc disease and osteoarthritis with pain in multiple joints. Has had multiple joint replacements.Pain is controlled with meds and no side effects. Needs refills.  Back Pain   This is a chronic problem. The current episode started more than 1 year ago. The problem occurs constantly. The problem is unchanged. The pain is present in the lumbar spine. The pain does not radiate. The pain is at a severity of 5/10. The pain is moderate. The pain is worse during the day. The symptoms are aggravated by bending, position, twisting and standing. Pertinent negatives include no dysuria. She has tried analgesics for the symptoms. The treatment provided significant relief.   Arthritis   Presents for follow-up visit. She complains of pain, stiffness and joint warmth. The symptoms have been stable. Affected locations include the right shoulder, left shoulder, left MCP, right MCP, left knee, right knee, right hip and left hip. Associated symptoms include pain at night. Pertinent negatives include no diarrhea, dysuria or Raynaud's syndrome. Compliance with total regimen is %. Compliance with medications is %.      The following portions of the patient's history were reviewed and updated as appropriate: allergies, current medications, past social history and problem list.    Outpatient Medications Prior to Visit   Medication Sig Dispense Refill   • amitriptyline (ELAVIL) 25 MG tablet Take 1 tablet by mouth Every Night. 90 tablet 3   • amLODIPine (NORVASC) 5 MG tablet Take 1 tablet by mouth Daily. 90 tablet 3   • calcium carbonate-vitamin d (CALTRATE 600+D) 600-400 MG-UNIT per tablet Take 1 tablet by mouth daily.     • lisinopril (PRINIVIL,ZESTRIL) 20 MG tablet TAKE ONE TABLET BY MOUTH EVERY DAY 90 tablet 3   • HYDROcodone-acetaminophen (NORCO) 5-325 MG per tablet Take 1 tablet by mouth Every 6 (Six)  "Hours As Needed for Moderate Pain (4-6). 120 tablet 0   • promethazine (PHENERGAN) 25 MG tablet Take 1 tablet by mouth Every 6 (Six) Hours As Needed for Nausea or Vomiting. 20 tablet 1   • sulfamethoxazole-trimethoprim (BACTRIM DS,SEPTRA DS) 800-160 MG per tablet Take 1 tablet by mouth 2 (Two) Times a Day. 14 tablet 0     No facility-administered medications prior to visit.        Review of Systems   Constitutional: Negative.  Negative for activity change, appetite change and unexpected weight change.   HENT: Negative.  Negative for ear pain, hearing loss, nosebleeds, rhinorrhea, sinus pressure, sneezing, tinnitus and trouble swallowing.    Eyes: Negative.  Negative for pain, discharge, redness, itching and visual disturbance.   Respiratory: Negative.  Negative for apnea, chest tightness and wheezing.    Cardiovascular: Negative.  Negative for leg swelling.   Gastrointestinal: Negative.  Negative for abdominal distention, constipation and diarrhea.   Endocrine: Negative.    Genitourinary: Negative.  Negative for dysuria.   Musculoskeletal: Positive for arthralgias, arthritis, back pain and stiffness. Negative for gait problem and neck stiffness.   Skin: Negative.  Negative for color change.   Allergic/Immunologic: Negative.    Neurological: Negative.  Negative for dizziness and light-headedness.   Hematological: Negative.  Negative for adenopathy.   Psychiatric/Behavioral: Negative.  Negative for dysphoric mood and sleep disturbance. The patient is not nervous/anxious.      Objective   Visit Vitals  /70 (BP Location: Left arm, Patient Position: Sitting, Cuff Size: Adult)   Pulse 60   Ht 170.2 cm (67\")   Wt 76.2 kg (168 lb)   SpO2 98%   BMI 26.31 kg/m²      Physical Exam   Constitutional: She is oriented to person, place, and time. She appears well-developed and well-nourished. No distress.   HENT:   Head: Normocephalic and atraumatic.   Nose: Nose normal.   Mouth/Throat: Oropharynx is clear and moist.   Eyes: " Pupils are equal, round, and reactive to light. Conjunctivae and EOM are normal. Right eye exhibits no discharge. Left eye exhibits no discharge.   Neck: No thyromegaly present.   Cardiovascular: Normal rate, regular rhythm, normal heart sounds and intact distal pulses.    Pulmonary/Chest: Effort normal and breath sounds normal.   Musculoskeletal:        Right shoulder: She exhibits tenderness.        Left shoulder: She exhibits tenderness.        Left knee: Tenderness found.        Lumbar back: She exhibits decreased range of motion and tenderness.   Lymphadenopathy:     She has no cervical adenopathy.   Neurological: She is alert and oriented to person, place, and time.   Skin: Skin is warm and dry.   Psychiatric: She has a normal mood and affect.   Nursing note and vitals reviewed.    Assessment/Plan   Problem List Items Addressed This Visit        Musculoskeletal and Integument    Degenerative joint disease involving multiple joints on both sides of body (Chronic)    Relevant Medications    HYDROcodone-acetaminophen (NORCO) 5-325 MG per tablet    Degenerative disc disease, lumbar - Primary (Chronic)    Relevant Medications    HYDROcodone-acetaminophen (NORCO) 5-325 MG per tablet      Other Visit Diagnoses     Need for influenza vaccination        Relevant Orders    Flu Vaccine High Dose PF 65YR+ ([id number]) (Completed)        Will notify regarding results. Ricki reviewed and appropriate. Not recommended to drive or operate heavy equipment while taking potentially sedating meds. Patient understands the risks associated with this controlled medication, including tolerance and addiction. They also agree to obtain this medication only from me, and not from a another provider, unless that provider is covering for me in my absence. They also agree to be compliant in dosing, and not self adjust the dose of medication.  A signed controlled substance agreement is on file, and they have received a controlled substance  education sheet at this or a previous visit. They have also signed a consent for treatment with a controlled substance as per [facility] policy.      New Medications Ordered This Visit   Medications   • HYDROcodone-acetaminophen (NORCO) 5-325 MG per tablet     Sig: Take 1 tablet by mouth Every 6 (Six) Hours As Needed for Moderate Pain (4-6).     Dispense:  120 tablet     Refill:  0     Return in about 2 months (around 12/26/2018) for Recheck.

## 2018-11-12 RX ORDER — AMITRIPTYLINE HYDROCHLORIDE 25 MG/1
TABLET, FILM COATED ORAL
Qty: 90 TABLET | Refills: 1 | Status: SHIPPED | OUTPATIENT
Start: 2018-11-12 | End: 2019-05-29 | Stop reason: SDUPTHER

## 2018-12-19 ENCOUNTER — OFFICE VISIT (OUTPATIENT)
Dept: FAMILY MEDICINE CLINIC | Facility: CLINIC | Age: 73
End: 2018-12-19

## 2018-12-19 VITALS
HEIGHT: 67 IN | OXYGEN SATURATION: 99 % | WEIGHT: 168.3 LBS | DIASTOLIC BLOOD PRESSURE: 60 MMHG | BODY MASS INDEX: 26.42 KG/M2 | SYSTOLIC BLOOD PRESSURE: 120 MMHG | HEART RATE: 69 BPM

## 2018-12-19 DIAGNOSIS — M51.36 DEGENERATIVE DISC DISEASE, LUMBAR: Primary | Chronic | ICD-10-CM

## 2018-12-19 DIAGNOSIS — M15.9 DEGENERATIVE JOINT DISEASE INVOLVING MULTIPLE JOINTS ON BOTH SIDES OF BODY: Chronic | ICD-10-CM

## 2018-12-19 PROCEDURE — 99213 OFFICE O/P EST LOW 20 MIN: CPT | Performed by: GENERAL PRACTICE

## 2018-12-19 RX ORDER — HYDROCODONE BITARTRATE AND ACETAMINOPHEN 5; 325 MG/1; MG/1
1 TABLET ORAL EVERY 6 HOURS PRN
Qty: 120 TABLET | Refills: 0 | Status: SHIPPED | OUTPATIENT
Start: 2018-12-19 | End: 2019-02-19 | Stop reason: SDUPTHER

## 2018-12-19 NOTE — PROGRESS NOTES
Subjective   Jaleesa Nam is a 73 y.o. female.    For review and evaluation of management of chronic degenerative disc disease and osteoarthritis with pain in multiple joints. Has had multiple joint replacements. Pain is controlled with meds and no side effects. Needs refills.  Back Pain   This is a chronic problem. The current episode started more than 1 year ago. The problem occurs constantly. The problem is unchanged. The pain is present in the lumbar spine. The pain does not radiate. The pain is at a severity of 5/10. The pain is moderate. The pain is worse during the day. The symptoms are aggravated by bending, position, twisting and standing. Pertinent negatives include no dysuria. She has tried analgesics for the symptoms. The treatment provided significant relief.   Arthritis   Presents for follow-up visit. She complains of pain, stiffness and joint warmth. The symptoms have been stable. Affected locations include the right shoulder, left shoulder, left MCP, right MCP, left knee, right knee, right hip and left hip. Associated symptoms include pain at night. Pertinent negatives include no diarrhea, dysuria or Raynaud's syndrome. Compliance with total regimen is %. Compliance with medications is %.      The following portions of the patient's history were reviewed and updated as appropriate: allergies, current medications, past social history and problem list.    Outpatient Medications Prior to Visit   Medication Sig Dispense Refill   • amitriptyline (ELAVIL) 25 MG tablet TAKE ONE TABLET BY MOUTH EVERY NIGHT. 90 tablet 1   • amLODIPine (NORVASC) 5 MG tablet Take 1 tablet by mouth Daily. 90 tablet 3   • calcium carbonate-vitamin d (CALTRATE 600+D) 600-400 MG-UNIT per tablet Take 1 tablet by mouth daily.     • lisinopril (PRINIVIL,ZESTRIL) 20 MG tablet TAKE ONE TABLET BY MOUTH EVERY DAY 90 tablet 3   • HYDROcodone-acetaminophen (NORCO) 5-325 MG per tablet Take 1 tablet by mouth Every 6 (Six)  "Hours As Needed for Moderate Pain (4-6). 120 tablet 0     No facility-administered medications prior to visit.        Review of Systems   Constitutional: Negative.  Negative for activity change, appetite change and unexpected weight change.   HENT: Negative.  Negative for ear pain, hearing loss, nosebleeds, rhinorrhea, sinus pressure, sneezing, tinnitus and trouble swallowing.    Eyes: Negative.  Negative for pain, discharge, redness, itching and visual disturbance.   Respiratory: Negative.  Negative for apnea, chest tightness and wheezing.    Cardiovascular: Negative.  Negative for leg swelling.   Gastrointestinal: Negative.  Negative for abdominal distention, constipation and diarrhea.   Endocrine: Negative.    Genitourinary: Negative.  Negative for dysuria.   Musculoskeletal: Positive for arthralgias, arthritis, back pain and stiffness. Negative for gait problem and neck stiffness.   Skin: Negative.  Negative for color change.   Allergic/Immunologic: Negative.    Neurological: Negative.  Negative for dizziness and light-headedness.   Hematological: Negative.  Negative for adenopathy.   Psychiatric/Behavioral: Negative.  Negative for dysphoric mood and sleep disturbance. The patient is not nervous/anxious.      Objective   Visit Vitals  /60   Pulse 69   Ht 170.2 cm (67\")   Wt 76.3 kg (168 lb 4.8 oz)   SpO2 99%   BMI 26.36 kg/m²      Physical Exam   Constitutional: She is oriented to person, place, and time. She appears well-developed and well-nourished. No distress.   HENT:   Head: Normocephalic and atraumatic.   Nose: Nose normal.   Mouth/Throat: Oropharynx is clear and moist.   Eyes: Conjunctivae and EOM are normal. Pupils are equal, round, and reactive to light. Right eye exhibits no discharge. Left eye exhibits no discharge.   Neck: No thyromegaly present.   Cardiovascular: Normal rate, regular rhythm, normal heart sounds and intact distal pulses.   Pulmonary/Chest: Effort normal and breath sounds " normal.   Musculoskeletal:        Right shoulder: She exhibits tenderness.        Left shoulder: She exhibits tenderness.        Left knee: Tenderness found.        Lumbar back: She exhibits decreased range of motion and tenderness.   Lymphadenopathy:     She has no cervical adenopathy.   Neurological: She is alert and oriented to person, place, and time.   Skin: Skin is warm and dry.   Psychiatric: She has a normal mood and affect.   Nursing note and vitals reviewed.    Assessment/Plan   Problem List Items Addressed This Visit        Musculoskeletal and Integument    Degenerative joint disease involving multiple joints on both sides of body (Chronic)    Relevant Medications    HYDROcodone-acetaminophen (NORCO) 5-325 MG per tablet    Degenerative disc disease, lumbar - Primary (Chronic)    Relevant Medications    HYDROcodone-acetaminophen (NORCO) 5-325 MG per tablet        Will notify regarding results. Ricki reviewed and appropriate. Not recommended to drive or operate heavy equipment while taking potentially sedating meds. Patient understands the risks associated with this controlled medication, including tolerance and addiction. They also agree to obtain this medication only from me, and not from a another provider, unless that provider is covering for me in my absence. They also agree to be compliant in dosing, and not self adjust the dose of medication.  A signed controlled substance agreement is on file, and they have received a controlled substance education sheet at this or a previous visit. They have also signed a consent for treatment with a controlled substance as per [facility] policy.      New Medications Ordered This Visit   Medications   • HYDROcodone-acetaminophen (NORCO) 5-325 MG per tablet     Sig: Take 1 tablet by mouth Every 6 (Six) Hours As Needed for Moderate Pain (4-6).     Dispense:  120 tablet     Refill:  0     No Follow-up on file.

## 2019-01-24 RX ORDER — AMLODIPINE BESYLATE 5 MG/1
TABLET ORAL
Qty: 90 TABLET | Refills: 2 | Status: SHIPPED | OUTPATIENT
Start: 2019-01-24 | End: 2019-07-22 | Stop reason: SDUPTHER

## 2019-02-19 ENCOUNTER — OFFICE VISIT (OUTPATIENT)
Dept: FAMILY MEDICINE CLINIC | Facility: CLINIC | Age: 74
End: 2019-02-19

## 2019-02-19 VITALS
HEART RATE: 56 BPM | HEIGHT: 67 IN | WEIGHT: 169.2 LBS | BODY MASS INDEX: 26.56 KG/M2 | SYSTOLIC BLOOD PRESSURE: 140 MMHG | DIASTOLIC BLOOD PRESSURE: 62 MMHG

## 2019-02-19 DIAGNOSIS — M54.50 LUMBAR BACK PAIN: Primary | ICD-10-CM

## 2019-02-19 DIAGNOSIS — M51.36 DEGENERATIVE DISC DISEASE, LUMBAR: Chronic | ICD-10-CM

## 2019-02-19 DIAGNOSIS — M15.9 DEGENERATIVE JOINT DISEASE INVOLVING MULTIPLE JOINTS ON BOTH SIDES OF BODY: Chronic | ICD-10-CM

## 2019-02-19 PROCEDURE — 99213 OFFICE O/P EST LOW 20 MIN: CPT | Performed by: GENERAL PRACTICE

## 2019-02-19 RX ORDER — HYDROCODONE BITARTRATE AND ACETAMINOPHEN 5; 325 MG/1; MG/1
1 TABLET ORAL EVERY 6 HOURS PRN
Qty: 120 TABLET | Refills: 0 | Status: SHIPPED | OUTPATIENT
Start: 2019-02-19 | End: 2019-04-19 | Stop reason: SDUPTHER

## 2019-02-19 NOTE — PROGRESS NOTES
Subjective   Jaleesa Nam is a 74 y.o. female.    For review and evaluation of management of chronic degenerative disc disease and osteoarthritis with pain in multiple joints. Has had multiple joint replacements. Pain is controlled with meds and no side effects. Needs refills.  Back Pain   This is a chronic problem. The current episode started more than 1 year ago. The problem occurs constantly. The problem is unchanged. The pain is present in the lumbar spine. The pain does not radiate. The pain is at a severity of 5/10. The pain is moderate. The pain is worse during the day. The symptoms are aggravated by bending, position, twisting and standing. Pertinent negatives include no dysuria. She has tried analgesics for the symptoms. The treatment provided significant relief.   Arthritis   Presents for follow-up visit. She complains of pain, stiffness and joint warmth. The symptoms have been stable. Affected locations include the right shoulder, left shoulder, left MCP, right MCP, left knee, right knee, right hip and left hip. Associated symptoms include pain at night. Pertinent negatives include no diarrhea, dysuria or Raynaud's syndrome. Compliance with total regimen is %. Compliance with medications is %.      The following portions of the patient's history were reviewed and updated as appropriate: allergies, current medications, past social history and problem list.    Outpatient Medications Prior to Visit   Medication Sig Dispense Refill   • amitriptyline (ELAVIL) 25 MG tablet TAKE ONE TABLET BY MOUTH EVERY NIGHT. 90 tablet 1   • amLODIPine (NORVASC) 5 MG tablet TAKE ONE TABLET BY MOUTH EVERY DAY 90 tablet 2   • calcium carbonate-vitamin d (CALTRATE 600+D) 600-400 MG-UNIT per tablet Take 1 tablet by mouth daily.     • lisinopril (PRINIVIL,ZESTRIL) 20 MG tablet TAKE ONE TABLET BY MOUTH EVERY DAY 90 tablet 3   • HYDROcodone-acetaminophen (NORCO) 5-325 MG per tablet Take 1 tablet by mouth Every 6  "(Six) Hours As Needed for Moderate Pain (4-6). 120 tablet 0     No facility-administered medications prior to visit.      Review of Systems   Constitutional: Negative.  Negative for activity change, appetite change and unexpected weight change.   HENT: Negative.  Negative for ear pain, hearing loss, nosebleeds, rhinorrhea, sinus pressure, sneezing, tinnitus and trouble swallowing.    Eyes: Negative.  Negative for pain, discharge, redness, itching and visual disturbance.   Respiratory: Negative.  Negative for apnea, chest tightness and wheezing.    Cardiovascular: Negative.  Negative for leg swelling.   Gastrointestinal: Negative.  Negative for abdominal distention, constipation and diarrhea.   Endocrine: Negative.    Genitourinary: Negative.  Negative for dysuria.   Musculoskeletal: Positive for arthralgias, arthritis, back pain and stiffness. Negative for gait problem and neck stiffness.   Skin: Negative.  Negative for color change.   Allergic/Immunologic: Negative.    Neurological: Negative.  Negative for dizziness and light-headedness.   Hematological: Negative.  Negative for adenopathy.   Psychiatric/Behavioral: Negative.  Negative for dysphoric mood and sleep disturbance. The patient is not nervous/anxious.      Objective   Visit Vitals  /62 (BP Location: Left arm, Patient Position: Lying)   Pulse 56   Ht 170.2 cm (67\")   Wt 76.7 kg (169 lb 3.2 oz)   BMI 26.50 kg/m²      Physical Exam   Constitutional: She is oriented to person, place, and time. She appears well-developed and well-nourished. No distress.   HENT:   Head: Normocephalic and atraumatic.   Nose: Nose normal.   Mouth/Throat: Oropharynx is clear and moist.   Eyes: Conjunctivae and EOM are normal. Pupils are equal, round, and reactive to light. Right eye exhibits no discharge. Left eye exhibits no discharge.   Neck: No thyromegaly present.   Cardiovascular: Normal rate, regular rhythm, normal heart sounds and intact distal pulses. "   Pulmonary/Chest: Effort normal and breath sounds normal.   Musculoskeletal:        Right shoulder: She exhibits tenderness.        Left shoulder: She exhibits tenderness.        Left knee: Tenderness found.        Lumbar back: She exhibits decreased range of motion and tenderness.   Lymphadenopathy:     She has no cervical adenopathy.   Neurological: She is alert and oriented to person, place, and time.   Skin: Skin is warm and dry.   Psychiatric: She has a normal mood and affect.   Nursing note and vitals reviewed.    Assessment/Plan   Problem List Items Addressed This Visit        Musculoskeletal and Integument    Degenerative joint disease involving multiple joints on both sides of body (Chronic)    Relevant Medications    HYDROcodone-acetaminophen (NORCO) 5-325 MG per tablet    Degenerative disc disease, lumbar (Chronic)    Relevant Medications    HYDROcodone-acetaminophen (NORCO) 5-325 MG per tablet      Other Visit Diagnoses     Lumbar back pain    -  Primary    Relevant Orders    XR Spine Lumbar 2 or 3 View (Completed)        Will notify regarding results. Ricki reviewed and appropriate. Not recommended to drive or operate heavy equipment while taking potentially sedating meds. Patient understands the risks associated with this controlled medication, including tolerance and addiction. They also agree to obtain this medication only from me, and not from a another provider, unless that provider is covering for me in my absence. They also agree to be compliant in dosing, and not self adjust the dose of medication.  A signed controlled substance agreement is on file, and they have received a controlled substance education sheet at this or a previous visit. They have also signed a consent for treatment with a controlled substance as per [facility] policy.      New Medications Ordered This Visit   Medications   • Suvorexant (BELSOMRA) 20 MG tablet     Sig: Take 20 mg by mouth Every Night.     Dispense:  10  tablet     Refill:  0   • HYDROcodone-acetaminophen (NORCO) 5-325 MG per tablet     Sig: Take 1 tablet by mouth Every 6 (Six) Hours As Needed for Moderate Pain (4-6).     Dispense:  120 tablet     Refill:  0     Return in about 2 months (around 4/19/2019) for Recheck.

## 2019-02-20 ENCOUNTER — TELEPHONE (OUTPATIENT)
Dept: FAMILY MEDICINE CLINIC | Facility: CLINIC | Age: 74
End: 2019-02-20

## 2019-02-20 NOTE — PROGRESS NOTES
Per Dr. Wright, Ms. Amos has been called with recent Lumbar Spine x-rays results & recommendations.  Continue current medications and follow-up as planned or sooner if any problems.

## 2019-02-20 NOTE — TELEPHONE ENCOUNTER
--Per Dr. Wright, Ms. Amos has been called with recent Lumbar Spine x-rays results & recommendations.  Continue current medications and follow-up as planned or sooner if any problems.      --- Message from Emi Wright MD sent at 2/20/2019 12:11 PM CST -----  Call and tell shows degenerative disc disease and osteoarthritis as expected, nothing else worrisome

## 2019-04-19 ENCOUNTER — OFFICE VISIT (OUTPATIENT)
Dept: FAMILY MEDICINE CLINIC | Facility: CLINIC | Age: 74
End: 2019-04-19

## 2019-04-19 VITALS
DIASTOLIC BLOOD PRESSURE: 62 MMHG | SYSTOLIC BLOOD PRESSURE: 130 MMHG | WEIGHT: 169.3 LBS | HEIGHT: 67 IN | BODY MASS INDEX: 26.57 KG/M2 | HEART RATE: 69 BPM | OXYGEN SATURATION: 98 %

## 2019-04-19 DIAGNOSIS — M15.9 DEGENERATIVE JOINT DISEASE INVOLVING MULTIPLE JOINTS ON BOTH SIDES OF BODY: Chronic | ICD-10-CM

## 2019-04-19 DIAGNOSIS — M51.36 DEGENERATIVE DISC DISEASE, LUMBAR: Chronic | ICD-10-CM

## 2019-04-19 DIAGNOSIS — Z12.11 SCREENING FOR COLON CANCER: Primary | ICD-10-CM

## 2019-04-19 PROCEDURE — 99213 OFFICE O/P EST LOW 20 MIN: CPT | Performed by: GENERAL PRACTICE

## 2019-04-19 RX ORDER — HYDROCODONE BITARTRATE AND ACETAMINOPHEN 5; 325 MG/1; MG/1
1 TABLET ORAL EVERY 6 HOURS PRN
Qty: 120 TABLET | Refills: 0 | Status: SHIPPED | OUTPATIENT
Start: 2019-04-19 | End: 2019-06-20 | Stop reason: SDUPTHER

## 2019-05-29 RX ORDER — AMITRIPTYLINE HYDROCHLORIDE 25 MG/1
TABLET, FILM COATED ORAL
Qty: 90 TABLET | Refills: 2 | Status: SHIPPED | OUTPATIENT
Start: 2019-05-29 | End: 2019-09-05

## 2019-05-31 RX ORDER — LISINOPRIL 20 MG/1
TABLET ORAL
Qty: 90 TABLET | Refills: 3 | Status: SHIPPED | OUTPATIENT
Start: 2019-05-31 | End: 2019-09-05

## 2019-06-06 DIAGNOSIS — Z12.11 SCREENING FOR COLON CANCER: ICD-10-CM

## 2019-06-10 ENCOUNTER — OFFICE VISIT (OUTPATIENT)
Dept: ORTHOPEDIC SURGERY | Facility: CLINIC | Age: 74
End: 2019-06-10

## 2019-06-10 DIAGNOSIS — M79.672 FOOT PAIN, LEFT: Primary | ICD-10-CM

## 2019-06-10 PROCEDURE — 99214 OFFICE O/P EST MOD 30 MIN: CPT | Performed by: NURSE PRACTITIONER

## 2019-06-10 NOTE — PROGRESS NOTES
Jaleesa Nam is a 74 y.o. female   Primary provider:  Emi Wright MD       Chief Complaint   Patient presents with   • Left Foot - second toe pain, Pain     X-rays @  urgent care     HISTORY OF PRESENT ILLNESS:left second toe.  Patient states she stubbed her toe 5 weeks ago.     Pain scale today 7/10    Pain   This is a new problem. The problem occurs constantly (severe). The problem has been unchanged. Associated symptoms include joint swelling, numbness and a sore throat. Associated symptoms comments: Aching and burning . The symptoms are aggravated by walking. She has tried ice and heat for the symptoms. The treatment provided no relief.        CONCURRENT MEDICAL HISTORY:    Past Medical History:   Diagnosis Date   • Abrasion     Abrasion and/or friction burn      • Acute bronchitis    • Acute laryngitis     probably viral   • Acute maxillary sinusitis    • Acute pharyngitis    • Acute serous otitis media    • Acute sinusitis    • Acute urinary tract infection    • Ankle edema    • Aortic valve regurgitation    • Astigmatism    • Breast cancer (CMS/HCC)    • Chest pain    • Chest pain    • Contusion of foot    • Cough    • Diastolic dysfunction    • Dyspnea    • Encounter for general adult medical examination without abnormal findings    • Encounter for routine adult health examination    • Essential hypertension    • Gastroesophageal reflux disease    • Heart murmur    • Hip pain    • Hyperlipidemia    • Low back pain    • Malaise and fatigue    • Mammogram abnormal    • Menopause    • Myopia    • Need for immunization against influenza    • Need for prophylactic vaccination against Streptococcus pneumoniae (pneumococcus)    • Nuclear cataract    • Osteoarthritis    • Palpitations    • Posterior subcapsular polar senile cataract    • Primary fibromyalgia syndrome    • Sciatica    • Screening for osteoporosis    • Screening mammogram, encounter for    • Shoulder pain    • Tear film insufficiency     • Trochanteric bursitis    • Upper respiratory infection    • Urinary tract infectious disease    • Vitamin D deficiency        No Known Allergies      Current Outpatient Medications:   •  amitriptyline (ELAVIL) 25 MG tablet, TAKE ONE TABLET BY MOUTH EVERY NIGHT., Disp: 90 tablet, Rfl: 2  •  amLODIPine (NORVASC) 5 MG tablet, TAKE ONE TABLET BY MOUTH EVERY DAY, Disp: 90 tablet, Rfl: 2  •  calcium carbonate-vitamin d (CALTRATE 600+D) 600-400 MG-UNIT per tablet, Take 1 tablet by mouth daily., Disp: , Rfl:   •  HYDROcodone-acetaminophen (NORCO) 5-325 MG per tablet, Take 1 tablet by mouth Every 6 (Six) Hours As Needed for Moderate Pain (4-6)., Disp: 120 tablet, Rfl: 0  •  lisinopril (PRINIVIL,ZESTRIL) 20 MG tablet, TAKE ONE TABLET BY MOUTH EVERY DAY, Disp: 90 tablet, Rfl: 3    Past Surgical History:   Procedure Laterality Date   • BREAST BIOPSY  02/10/2011    Stereotactic breast biopsy (3)    Sterotactic location guide localization of the abnormality with wire placement. Left lumpectomy. Faxigram.    • BREAST LUMPECTOMY     • CARDIAC CATHETERIZATION  10/01/2010    Cardiac cath 24822 (1)    Minimal plaquing in the first diagonal branch with evidence of good POORNIMA 3 flow. Preserved left ventricular systolic function with ejection fraction of 55%.    • CERVICAL SPINE SURGERY  06/09/1993    Cervical spine disk surgery (1)    C5-C6 anterior cervical discectomy and fusion with iliac bone graft.    • ENDOSCOPY  10/07/2015    Colon endoscopy 80903 (2)    negative cologard    • HIP ARTHROPLASTY  09/12/2011    Anesth, hip joint procedure (1)    Right total hip arthroplasty with adductor tenotomy. Osteoarthritis of the right hip.    • INJECTION OF MEDICATION  10/01/2014    Kenalog (4)      • INJECTION OF MEDICATION  09/03/2012    Rocephin (1)      • KNEE SURGERY  02/03/2016    Knee Surgery (1)    Left total knee arthroplasty.    • NECK SURGERY      Neck spinal fusion (1)      • NOSE SURGERY  11/18/1978    Treat nasal (nose)  fracture (1) Nasal fracture reduction with splint fixation.    • OTHER SURGICAL HISTORY  06/22/2010    Correction of bunion (1)    Hallux valgus deformity of right foot. Base wedge bunionectomy of right foot.    • PAP SMEAR  12/10/2008   • SHOULDER SURGERY  06/17/2013    Shoulder arthroscopy/surgery (2)    Arthroscopy of the right shoulder with rotator cuff repair.    • TOTAL ABDOMINAL HYSTERECTOMY WITH SALPINGO OOPHORECTOMY         Family History   Problem Relation Age of Onset   • Coronary artery disease Other    • Hypertension Other    • Cancer Other         lung, stomach, bladder   • Arthritis Mother    • Hypertension Mother    • Heart disease Mother    • Hyperlipidemia Mother    • Cancer Maternal Grandmother    • Colon cancer Maternal Grandmother    • Cancer Maternal Grandfather        Social History     Socioeconomic History   • Marital status:      Spouse name: Not on file   • Number of children: Not on file   • Years of education: Not on file   • Highest education level: Not on file   Tobacco Use   • Smoking status: Never Smoker   • Smokeless tobacco: Never Used        Review of Systems   HENT: Positive for sore throat.    Eyes: Positive for pain and visual disturbance.   Musculoskeletal: Positive for joint swelling.        Left second toe pain   Neurological: Positive for numbness.   Hematological: Bruises/bleeds easily.       PHYSICAL EXAMINATION:       There were no vitals taken for this visit.    Physical Exam   Constitutional: She is oriented to person, place, and time. Vital signs are normal. She appears well-developed and well-nourished. She is cooperative.   HENT:   Head: Normocephalic and atraumatic.   Neck: Trachea normal and phonation normal.   Pulmonary/Chest: Effort normal. No respiratory distress.   Abdominal: Soft. Normal appearance. She exhibits no distension.   Neurological: She is alert and oriented to person, place, and time. GCS eye subscore is 4. GCS verbal subscore is 5. GCS  motor subscore is 6.   Skin: Skin is warm, dry and intact. Capillary refill takes less than 2 seconds.   Psychiatric: She has a normal mood and affect. Her speech is normal and behavior is normal. Judgment and thought content normal. Cognition and memory are normal.   Vitals reviewed.      GAIT:     []  Normal  [x]  Antalgic    Assistive device: [x]  None  []  Walker     []  Crutches  []  Cane     []  Wheelchair  []  Stretcher    Right Ankle Exam   Right ankle exam is normal.      Left Ankle Exam     Tenderness   Left ankle tenderness location: 2nd toe.   Swelling: mild    Range of Motion   Dorsiflexion: normal   Plantar flexion: normal   Eversion: normal   Inversion: normal     Muscle Strength   Dorsiflexion:  5/5   Plantar flexion:  5/5     Other   Erythema: absent  Scars: absent  Sensation: normal  Pulse: present                  Xr Foot 3+ View Left    Result Date: 5/13/2019  Narrative: Procedure:  Left foot    Indication:  Left foot pain   . No injury Technique:  Review   . Prior relevant exam:  None. Moderate hallux valgus deformity. Large plantar calcaneal spur. There are also spurs, osteophytes in the dorsum of foot. Left foot is otherwise unremarkable.     Impression: CONCLUSION: As above Electronically signed by:  Fadi Oakley MD  5/13/2019 12:52 PM CDT Workstation: [id number]          ASSESSMENT:    Diagnoses and all orders for this visit:    Foot pain, left          PLAN  We will recommend modified weightbearing and a postop shoe with follow-up planned in 4 weeks for recheck and repeat x-rays left foot.  Patient was placed in a postop shoe prior to discharge  No Follow-up on file.    TOM Guidry

## 2019-06-20 ENCOUNTER — OFFICE VISIT (OUTPATIENT)
Dept: FAMILY MEDICINE CLINIC | Facility: CLINIC | Age: 74
End: 2019-06-20

## 2019-06-20 VITALS
DIASTOLIC BLOOD PRESSURE: 70 MMHG | WEIGHT: 164.7 LBS | HEIGHT: 67 IN | HEART RATE: 75 BPM | SYSTOLIC BLOOD PRESSURE: 124 MMHG | BODY MASS INDEX: 25.85 KG/M2 | OXYGEN SATURATION: 98 %

## 2019-06-20 DIAGNOSIS — M54.2 NECK PAIN: ICD-10-CM

## 2019-06-20 DIAGNOSIS — M15.9 DEGENERATIVE JOINT DISEASE INVOLVING MULTIPLE JOINTS ON BOTH SIDES OF BODY: Chronic | ICD-10-CM

## 2019-06-20 DIAGNOSIS — M50.90 CERVICAL DISC DISEASE: Primary | ICD-10-CM

## 2019-06-20 DIAGNOSIS — R20.0 NUMBNESS AND TINGLING IN LEFT ARM: ICD-10-CM

## 2019-06-20 DIAGNOSIS — R20.2 NUMBNESS AND TINGLING IN LEFT ARM: ICD-10-CM

## 2019-06-20 DIAGNOSIS — M51.36 DEGENERATIVE DISC DISEASE, LUMBAR: Chronic | ICD-10-CM

## 2019-06-20 PROCEDURE — 99214 OFFICE O/P EST MOD 30 MIN: CPT | Performed by: GENERAL PRACTICE

## 2019-06-20 RX ORDER — HYDROCODONE BITARTRATE AND ACETAMINOPHEN 5; 325 MG/1; MG/1
1 TABLET ORAL EVERY 6 HOURS PRN
Qty: 120 TABLET | Refills: 0 | Status: SHIPPED | OUTPATIENT
Start: 2019-06-20 | End: 2019-08-07 | Stop reason: SDUPTHER

## 2019-06-20 NOTE — PROGRESS NOTES
Subjective   Jaleesa Nam is a 74 y.o. female.    For review and evaluation of management of chronic degenerative disc disease and osteoarthritis with pain in multiple joints. Has had multiple joint replacements. Pain is controlled with meds and no side effects. Needs refills. Is having increasing neck pain and numbness down left arm. Has known degenerative disc disease and has had previous neck surgery.   Back Pain   This is a chronic problem. The current episode started more than 1 year ago. The problem occurs constantly. The problem is unchanged. The pain is present in the lumbar spine. The pain does not radiate. The pain is at a severity of 5/10. The pain is moderate. The pain is worse during the day. The symptoms are aggravated by bending, position, twisting and standing. Associated symptoms include numbness and tingling. Pertinent negatives include no dysuria. She has tried analgesics for the symptoms. The treatment provided significant relief.   Arthritis   Presents for follow-up visit. She complains of pain, stiffness and joint warmth. The symptoms have been stable. Affected locations include the right shoulder, left shoulder, left MCP, right MCP, left knee, right knee, right hip and left hip. Associated symptoms include pain at night. Pertinent negatives include no diarrhea, dysuria or Raynaud's syndrome. Compliance with total regimen is %. Compliance with medications is %.   Neck Pain    This is a chronic problem. The current episode started more than 1 year ago. The problem occurs constantly. The problem has been gradually worsening. The pain is associated with nothing. The pain is present in the midline, right side and left side. The pain is at a severity of 8/10. The pain is severe. The symptoms are aggravated by position and twisting. The pain is same all the time. Stiffness is present in the morning. Associated symptoms include numbness and tingling. Pertinent negatives include no  trouble swallowing. She has tried oral narcotics for the symptoms. The treatment provided moderate relief.      The following portions of the patient's history were reviewed and updated as appropriate: allergies, current medications, past social history and problem list.    Outpatient Medications Prior to Visit   Medication Sig Dispense Refill   • amitriptyline (ELAVIL) 25 MG tablet TAKE ONE TABLET BY MOUTH EVERY NIGHT. 90 tablet 2   • amLODIPine (NORVASC) 5 MG tablet TAKE ONE TABLET BY MOUTH EVERY DAY 90 tablet 2   • calcium carbonate-vitamin d (CALTRATE 600+D) 600-400 MG-UNIT per tablet Take 1 tablet by mouth daily.     • lisinopril (PRINIVIL,ZESTRIL) 20 MG tablet TAKE ONE TABLET BY MOUTH EVERY DAY 90 tablet 3   • HYDROcodone-acetaminophen (NORCO) 5-325 MG per tablet Take 1 tablet by mouth Every 6 (Six) Hours As Needed for Moderate Pain (4-6). 120 tablet 0     No facility-administered medications prior to visit.      Review of Systems   Constitutional: Negative.  Negative for activity change, appetite change and unexpected weight change.   HENT: Negative.  Negative for ear pain, hearing loss, nosebleeds, rhinorrhea, sinus pressure, sneezing, tinnitus and trouble swallowing.    Eyes: Negative.  Negative for pain, discharge, redness, itching and visual disturbance.   Respiratory: Negative.  Negative for apnea, chest tightness and wheezing.    Cardiovascular: Negative.  Negative for leg swelling.   Gastrointestinal: Negative.  Negative for abdominal distention, constipation and diarrhea.   Endocrine: Negative.    Genitourinary: Negative.  Negative for dysuria.   Musculoskeletal: Positive for arthralgias, arthritis, back pain, neck pain and stiffness. Negative for gait problem and neck stiffness.   Skin: Negative.  Negative for color change.   Allergic/Immunologic: Negative.    Neurological: Positive for tingling and numbness. Negative for dizziness and light-headedness.   Hematological: Negative.  Negative for  "adenopathy.   Psychiatric/Behavioral: Negative.  Negative for dysphoric mood and sleep disturbance. The patient is not nervous/anxious.      Objective   Visit Vitals  /70   Pulse 75   Ht 170.2 cm (67\")   Wt 74.7 kg (164 lb 11.2 oz)   SpO2 98%   BMI 25.80 kg/m²      Physical Exam   Constitutional: She is oriented to person, place, and time. She appears well-developed and well-nourished. No distress.   HENT:   Head: Normocephalic and atraumatic.   Nose: Nose normal.   Mouth/Throat: Oropharynx is clear and moist.   Eyes: Conjunctivae and EOM are normal. Pupils are equal, round, and reactive to light. Right eye exhibits no discharge. Left eye exhibits no discharge.   Neck: No thyromegaly present.   Cardiovascular: Normal rate, regular rhythm, normal heart sounds and intact distal pulses.   Pulmonary/Chest: Effort normal and breath sounds normal.   Musculoskeletal:        Right shoulder: She exhibits tenderness.        Left shoulder: She exhibits tenderness.        Left knee: Tenderness found.        Lumbar back: She exhibits decreased range of motion and tenderness.   Lymphadenopathy:     She has no cervical adenopathy.   Neurological: She is alert and oriented to person, place, and time.   Skin: Skin is warm and dry.   Psychiatric: She has a normal mood and affect.   Nursing note and vitals reviewed.    Assessment/Plan   Problem List Items Addressed This Visit        Musculoskeletal and Integument    Degenerative joint disease involving multiple joints on both sides of body (Chronic)    Relevant Medications    HYDROcodone-acetaminophen (NORCO) 5-325 MG per tablet    Degenerative disc disease, lumbar (Chronic)    Relevant Medications    HYDROcodone-acetaminophen (NORCO) 5-325 MG per tablet      Other Visit Diagnoses     Cervical disc disease    -  Primary    Relevant Orders    Ambulatory Referral to Neurosurgery (Completed)    XR Spine Cervical 3 View (Completed)    Numbness and tingling in left arm        " Relevant Orders    Ambulatory Referral to Neurosurgery (Completed)    XR Spine Cervical 3 View (Completed)    Neck pain        Relevant Orders    Ambulatory Referral to Neurosurgery (Completed)    XR Spine Cervical 3 View (Completed)        Will notify regarding results. Ricki reviewed and appropriate. Not recommended to drive or operate heavy equipment while taking potentially sedating meds. Patient understands the risks associated with this controlled medication, including tolerance and addiction. They also agree to obtain this medication only from me, and not from a another provider, unless that provider is covering for me in my absence. They also agree to be compliant in dosing, and not self adjust the dose of medication.  A signed controlled substance agreement is on file, and they have received a controlled substance education sheet at this or a previous visit. They have also signed a consent for treatment with a controlled substance as per [facility] policy.      New Medications Ordered This Visit   Medications   • HYDROcodone-acetaminophen (NORCO) 5-325 MG per tablet     Sig: Take 1 tablet by mouth Every 6 (Six) Hours As Needed for Moderate Pain (4-6).     Dispense:  120 tablet     Refill:  0     Return in about 2 months (around 8/20/2019) for Annual physical, medicare wellness visit.

## 2019-06-24 ENCOUNTER — TELEPHONE (OUTPATIENT)
Dept: FAMILY MEDICINE CLINIC | Facility: CLINIC | Age: 74
End: 2019-06-24

## 2019-06-24 DIAGNOSIS — R20.2 NUMBNESS AND TINGLING IN LEFT ARM: ICD-10-CM

## 2019-06-24 DIAGNOSIS — R20.0 NUMBNESS AND TINGLING IN LEFT ARM: ICD-10-CM

## 2019-06-24 DIAGNOSIS — M50.90 CERVICAL DISC DISEASE: Primary | ICD-10-CM

## 2019-06-24 DIAGNOSIS — Z78.0 POST-MENOPAUSAL: Primary | ICD-10-CM

## 2019-06-24 DIAGNOSIS — M54.2 NECK PAIN: ICD-10-CM

## 2019-06-24 DIAGNOSIS — Z12.31 ENCOUNTER FOR SCREENING MAMMOGRAM FOR MALIGNANT NEOPLASM OF BREAST: Primary | ICD-10-CM

## 2019-06-24 RX ORDER — LORAZEPAM 0.5 MG/1
TABLET ORAL
Qty: 4 TABLET | Refills: 0 | Status: SHIPPED | OUTPATIENT
Start: 2019-06-24 | End: 2019-08-01

## 2019-06-24 NOTE — TELEPHONE ENCOUNTER
LUCIANO JESSICA HAS MRI SET UP FOR THIS THURSDAY AND IS NEEDING SOMETHING FOR ANXIETY BEFORE TEST..PLEASE SEND PRESP TO  [facility]   HER# [phone]

## 2019-06-27 ENCOUNTER — HOSPITAL ENCOUNTER (OUTPATIENT)
Dept: MRI IMAGING | Facility: HOSPITAL | Age: 74
Discharge: HOME | End: 2019-06-27
Admitting: GENERAL PRACTICE

## 2019-06-27 DIAGNOSIS — M50.90 CERVICAL DISC DISEASE: ICD-10-CM

## 2019-06-27 DIAGNOSIS — R20.2 NUMBNESS AND TINGLING IN LEFT ARM: ICD-10-CM

## 2019-06-27 DIAGNOSIS — M54.2 NECK PAIN: ICD-10-CM

## 2019-06-27 DIAGNOSIS — R20.0 NUMBNESS AND TINGLING IN LEFT ARM: ICD-10-CM

## 2019-06-27 PROCEDURE — 72141 MRI NECK SPINE W/O DYE: CPT

## 2019-07-01 ENCOUNTER — TELEPHONE (OUTPATIENT)
Dept: FAMILY MEDICINE CLINIC | Facility: CLINIC | Age: 74
End: 2019-07-01

## 2019-07-01 NOTE — TELEPHONE ENCOUNTER
Spoke with Mr. Dawn and let him know that I was faxing the referral today and getting the process started. Gave him my direct line incase he needed it.

## 2019-07-01 NOTE — TELEPHONE ENCOUNTER
Jose Luis called and said that Jaleesa saw Dr Wright last week and Dr Wright told her to find Neurosurgeon she wanted to see.  She has found Dr Troy Montiel in [place] and she needs a referral sent.  Can you contact them and get the info that you need to send them, he did not know.  He also asked that he be called and updated on this.    Jose Luis [phone]

## 2019-07-22 RX ORDER — AMLODIPINE BESYLATE 5 MG/1
TABLET ORAL
Qty: 90 TABLET | Refills: 2 | Status: SHIPPED | OUTPATIENT
Start: 2019-07-22 | End: 2019-09-05

## 2019-08-01 ENCOUNTER — OFFICE VISIT (OUTPATIENT)
Dept: PODIATRY | Facility: CLINIC | Age: 74
End: 2019-08-01

## 2019-08-01 VITALS — HEIGHT: 67 IN | BODY MASS INDEX: 25.99 KG/M2 | WEIGHT: 165.6 LBS | OXYGEN SATURATION: 96 % | HEART RATE: 84 BPM

## 2019-08-01 DIAGNOSIS — M79.672 LEFT FOOT PAIN: ICD-10-CM

## 2019-08-01 DIAGNOSIS — M20.42 HAMMER TOE OF LEFT FOOT: ICD-10-CM

## 2019-08-01 DIAGNOSIS — M20.12 HALLUX VALGUS OF LEFT FOOT: Primary | ICD-10-CM

## 2019-08-01 PROCEDURE — 99203 OFFICE O/P NEW LOW 30 MIN: CPT | Performed by: PODIATRIST

## 2019-08-01 NOTE — PROGRESS NOTES
Jaleesa Nam  1945  74 y.o. female     Patient presents to clinic today with complaint of left 2nd toe pain.     08/01/2019  Chief Complaint   Patient presents with   • Left Foot - Pain           History of Present Illness    Jaleesa Nam is a 74 y.o. female who presents for evaluation of left foot pain.  Patient states pain is primarily located in her forefoot, specifically her first and second toes.  She does note that she did develop her second toe a couple of months prior.  The pain did seem to improve initially however has been gradually worsening since that time.  She does note chronic history of bunion deformity, she previously had corrective surgery on the right foot.  She denies any more recent treatments.  She describes her pain is aching at times throbbing.  The pain is worse and close toed shoe gear and relatively relieved with rest.        Past Medical History:   Diagnosis Date   • Abrasion     Abrasion and/or friction burn      • Acute bronchitis    • Acute laryngitis     probably viral   • Acute maxillary sinusitis    • Acute pharyngitis    • Acute serous otitis media    • Acute sinusitis    • Acute urinary tract infection    • Ankle edema    • Aortic valve regurgitation    • Astigmatism    • Breast cancer (CMS/HCC)    • Bunion    • Chest pain    • Chest pain    • Contusion of foot    • Cough    • Diastolic dysfunction    • Dyspnea    • Encounter for general adult medical examination without abnormal findings    • Encounter for routine adult health examination    • Essential hypertension    • Gastroesophageal reflux disease    • Hammer toe    • Heart murmur    • Hip pain    • Hyperlipidemia    • Low back pain    • Malaise and fatigue    • Mammogram abnormal    • Menopause    • Myopia    • Need for immunization against influenza    • Need for prophylactic vaccination against Streptococcus pneumoniae (pneumococcus)    • Nuclear cataract    • Osteoarthritis    • Palpitations    •  Plantar fasciitis    • Posterior subcapsular polar senile cataract    • Primary fibromyalgia syndrome    • Sciatica    • Screening for osteoporosis    • Screening mammogram, encounter for    • Shoulder pain    • Tear film insufficiency    • Trochanteric bursitis    • Upper respiratory infection    • Urinary tract infectious disease    • Vitamin D deficiency          Past Surgical History:   Procedure Laterality Date   • BREAST BIOPSY  02/10/2011    Stereotactic breast biopsy (3)    Sterotactic location guide localization of the abnormality with wire placement. Left lumpectomy. Faxigram.    • BREAST LUMPECTOMY     • CARDIAC CATHETERIZATION  10/01/2010    Cardiac cath 63559 (1)    Minimal plaquing in the first diagonal branch with evidence of good POORNIMA 3 flow. Preserved left ventricular systolic function with ejection fraction of 55%.    • CERVICAL SPINE SURGERY  06/09/1993    Cervical spine disk surgery (1)    C5-C6 anterior cervical discectomy and fusion with iliac bone graft.    • ENDOSCOPY  10/07/2015    Colon endoscopy 58346 (2)    negative cologard    • HIP ARTHROPLASTY  09/12/2011    Anesth, hip joint procedure (1)    Right total hip arthroplasty with adductor tenotomy. Osteoarthritis of the right hip.    • INJECTION OF MEDICATION  10/01/2014    Kenalog (4)      • INJECTION OF MEDICATION  09/03/2012    Rocephin (1)      • KNEE SURGERY  02/03/2016    Knee Surgery (1)    Left total knee arthroplasty.    • NECK SURGERY      Neck spinal fusion (1)      • NOSE SURGERY  11/18/1978    Treat nasal (nose) fracture (1) Nasal fracture reduction with splint fixation.    • OTHER SURGICAL HISTORY  06/22/2010    Correction of bunion (1)    Hallux valgus deformity of right foot. Base wedge bunionectomy of right foot.    • PAP SMEAR  12/10/2008   • SHOULDER SURGERY  06/17/2013    Shoulder arthroscopy/surgery (2)    Arthroscopy of the right shoulder with rotator cuff repair.    • TOTAL ABDOMINAL HYSTERECTOMY WITH SALPINGO  "OOPHORECTOMY           Family History   Problem Relation Age of Onset   • Coronary artery disease Other    • Hypertension Other    • Cancer Other         lung, stomach, bladder   • Arthritis Mother    • Hypertension Mother    • Heart disease Mother    • Hyperlipidemia Mother    • Cancer Maternal Grandmother    • Colon cancer Maternal Grandmother    • Cancer Maternal Grandfather          Social History     Socioeconomic History   • Marital status:      Spouse name: Not on file   • Number of children: Not on file   • Years of education: Not on file   • Highest education level: Not on file   Tobacco Use   • Smoking status: Never Smoker   • Smokeless tobacco: Never Used         Current Outpatient Medications   Medication Sig Dispense Refill   • amitriptyline (ELAVIL) 25 MG tablet TAKE ONE TABLET BY MOUTH EVERY NIGHT. 90 tablet 2   • amLODIPine (NORVASC) 5 MG tablet TAKE ONE TABLET BY MOUTH EVERY DAY 90 tablet 2   • calcium carbonate-vitamin d (CALTRATE 600+D) 600-400 MG-UNIT per tablet Take 1 tablet by mouth daily.     • HYDROcodone-acetaminophen (NORCO) 5-325 MG per tablet Take 1 tablet by mouth Every 6 (Six) Hours As Needed for Moderate Pain (4-6). 120 tablet 0   • lisinopril (PRINIVIL,ZESTRIL) 20 MG tablet TAKE ONE TABLET BY MOUTH EVERY DAY 90 tablet 3     No current facility-administered medications for this visit.          OBJECTIVE    Pulse 84   Ht 170.2 cm (67\")   Wt 75.1 kg (165 lb 9.6 oz)   SpO2 96%   BMI 25.94 kg/m²       Review of Systems   Constitutional: Negative.    HENT: Positive for hearing loss.    Eyes: Negative.    Respiratory: Negative.    Cardiovascular: Positive for leg swelling.   Gastrointestinal: Negative.    Endocrine: Negative.    Genitourinary: Negative.    Musculoskeletal: Positive for arthralgias, back pain and joint swelling.        Foot pain, joint pain   Skin: Negative.    Allergic/Immunologic: Negative.    Neurological: Positive for numbness.   Hematological: Negative.  "         Physical Exam   Constitutional: she appears well-developed and well-nourished.   HEENT: Normocephalic. Atraumatic  CV: No CP. RRR  Resp: Non-labored respirations  Psychiatric: she has a normal mood and affect. her behavior is normal.         Lower Extremity Exam:  Vascular: DP/PT pulses palpable 2+.   Minimal left foot edema  Foot warm  CFT wnl  Neuro: Protective sensation intact, b/l.  DTRs intact  Integument: No open wounds or lesions.  No erythema, scaling  Skin quality normal  Musculoskeletal: LE muscle strength 5/5.   Gait normal  Ankle ROM decreased without pain or crepitus  STJ ROM full without pain or crepitus  1st MTPJ ROM decreased with mild tenderness. Mild to moderate HAV  +Dorsomedial 1st mtpj eminence. Mild tenderness on palpation.  Semirigid hammertoe deformities toes 2 and 3 on left.  Positive tenderness palpation of left second toe.  Numbness to palpation of plantar second MTPJ.              ASSESSMENT AND PLAN    Jaleesa was seen today for pain.    Diagnoses and all orders for this visit:    Hallux valgus of left foot    Hammer toe of left foot    Left foot pain      -Comprehensive foot and ankle exam performed  -Radiographs reviewed  -Educated pt on diagnosis, etiology and treatment of hallux abducto valgus concurrent painful hammertoe deformity..  -Recommend soft, wide toe box accommodative shoe gear.  -NSAIDs, toe padding and sleeves as needed  -Did discuss possible surgical correction in future if symptoms do not improve.  Patient believes she is interested in this option, however would like to hold off for now.  -Recheck as needed            This document has been electronically signed by Williams Perry DPM on August 4, 2019 2:30 PM     EMR Dragon/Transcription disclaimer:   Much of this encounter note is an electronic transcription/translation of spoken language to printed text. The electronic translation of spoken language may permit erroneous, or at times, nonsensical words or  phrases to be inadvertently transcribed; Although I have reviewed the note for such errors, some may still exist.    Williams Perry DPM  8/4/2019  2:30 PM

## 2019-08-07 ENCOUNTER — OFFICE VISIT (OUTPATIENT)
Dept: FAMILY MEDICINE CLINIC | Facility: CLINIC | Age: 74
End: 2019-08-07

## 2019-08-07 ENCOUNTER — LAB (OUTPATIENT)
Dept: LAB | Facility: HOSPITAL | Age: 74
End: 2019-08-07

## 2019-08-07 VITALS
DIASTOLIC BLOOD PRESSURE: 70 MMHG | OXYGEN SATURATION: 98 % | SYSTOLIC BLOOD PRESSURE: 120 MMHG | HEART RATE: 73 BPM | BODY MASS INDEX: 25.94 KG/M2 | WEIGHT: 165.3 LBS | HEIGHT: 67 IN

## 2019-08-07 DIAGNOSIS — M15.9 DEGENERATIVE JOINT DISEASE INVOLVING MULTIPLE JOINTS ON BOTH SIDES OF BODY: Chronic | ICD-10-CM

## 2019-08-07 DIAGNOSIS — R30.0 DYSURIA: Primary | ICD-10-CM

## 2019-08-07 DIAGNOSIS — M51.36 DEGENERATIVE DISC DISEASE, LUMBAR: Chronic | ICD-10-CM

## 2019-08-07 DIAGNOSIS — M50.90 CERVICAL DISC DISEASE: Primary | ICD-10-CM

## 2019-08-07 DIAGNOSIS — R30.0 DYSURIA: ICD-10-CM

## 2019-08-07 LAB
BACTERIA UR QL AUTO: ABNORMAL /HPF
BILIRUB UR QL STRIP: NEGATIVE
CLARITY UR: ABNORMAL
COLOR UR: YELLOW
GLUCOSE UR STRIP-MCNC: NEGATIVE MG/DL
HGB UR QL STRIP.AUTO: NEGATIVE
HYALINE CASTS UR QL AUTO: ABNORMAL /LPF
KETONES UR QL STRIP: NEGATIVE
LEUKOCYTE ESTERASE UR QL STRIP.AUTO: ABNORMAL
NITRITE UR QL STRIP: NEGATIVE
PH UR STRIP.AUTO: <=5 [PH] (ref 5–8)
PROT UR QL STRIP: NEGATIVE
RBC # UR: ABNORMAL /HPF
REF LAB TEST METHOD: ABNORMAL
SP GR UR STRIP: 1.02 (ref 1–1.03)
SQUAMOUS #/AREA URNS HPF: ABNORMAL /HPF
UROBILINOGEN UR QL STRIP: ABNORMAL
WBC UR QL AUTO: ABNORMAL /HPF

## 2019-08-07 PROCEDURE — 87186 SC STD MICRODIL/AGAR DIL: CPT

## 2019-08-07 PROCEDURE — 87086 URINE CULTURE/COLONY COUNT: CPT

## 2019-08-07 PROCEDURE — 99213 OFFICE O/P EST LOW 20 MIN: CPT | Performed by: GENERAL PRACTICE

## 2019-08-07 PROCEDURE — 81001 URINALYSIS AUTO W/SCOPE: CPT

## 2019-08-07 PROCEDURE — 87088 URINE BACTERIA CULTURE: CPT

## 2019-08-07 RX ORDER — HYDROCODONE BITARTRATE AND ACETAMINOPHEN 5; 325 MG/1; MG/1
1 TABLET ORAL EVERY 6 HOURS PRN
Qty: 120 TABLET | Refills: 0 | Status: SHIPPED | OUTPATIENT
Start: 2019-08-07 | End: 2019-08-26 | Stop reason: SDUPTHER

## 2019-08-07 RX ORDER — SULFAMETHOXAZOLE AND TRIMETHOPRIM 800; 160 MG/1; MG/1
1 TABLET ORAL 2 TIMES DAILY
Qty: 10 TABLET | Refills: 0 | Status: SHIPPED | OUTPATIENT
Start: 2019-08-07 | End: 2019-08-26

## 2019-08-07 NOTE — PROGRESS NOTES
Subjective   Jaleesa Nam is a 74 y.o. female.    For review and evaluation of management of chronic degenerative disc disease and osteoarthritis with pain in multiple joints. Has had multiple joint replacements. Pain is controlled with meds and no side effects. Needs refills. Is having increasing neck pain and numbness down left arm. Has known degenerative disc disease and has had previous neck surgery. Saw Dr. Sales who did not feel surgery was indicated at this time.   Back Pain   This is a chronic problem. The current episode started more than 1 year ago. The problem occurs constantly. The problem is unchanged. The pain is present in the lumbar spine. The pain does not radiate. The pain is at a severity of 5/10. The pain is moderate. The pain is worse during the day. The symptoms are aggravated by bending, position, twisting and standing. Associated symptoms include numbness and tingling. Pertinent negatives include no dysuria. She has tried analgesics for the symptoms. The treatment provided significant relief.   Arthritis   Presents for follow-up visit. She complains of pain, stiffness and joint warmth. The symptoms have been stable. Affected locations include the right shoulder, left shoulder, left MCP, right MCP, left knee, right knee, right hip and left hip. Associated symptoms include pain at night. Pertinent negatives include no diarrhea, dysuria or Raynaud's syndrome. Compliance with total regimen is %. Compliance with medications is %.   Neck Pain    This is a chronic problem. The current episode started more than 1 year ago. The problem occurs constantly. The problem has been gradually worsening. The pain is associated with nothing. The pain is present in the midline, right side and left side. The pain is at a severity of 8/10. The pain is severe. The symptoms are aggravated by position and twisting. The pain is same all the time. Stiffness is present in the morning. Associated  symptoms include numbness and tingling. Pertinent negatives include no trouble swallowing. She has tried oral narcotics for the symptoms. The treatment provided moderate relief.      The following portions of the patient's history were reviewed and updated as appropriate: allergies, current medications, past social history and problem list.    Outpatient Medications Prior to Visit   Medication Sig Dispense Refill   • amitriptyline (ELAVIL) 25 MG tablet TAKE ONE TABLET BY MOUTH EVERY NIGHT. 90 tablet 2   • amLODIPine (NORVASC) 5 MG tablet TAKE ONE TABLET BY MOUTH EVERY DAY 90 tablet 2   • calcium carbonate-vitamin d (CALTRATE 600+D) 600-400 MG-UNIT per tablet Take 1 tablet by mouth daily.     • lisinopril (PRINIVIL,ZESTRIL) 20 MG tablet TAKE ONE TABLET BY MOUTH EVERY DAY 90 tablet 3   • HYDROcodone-acetaminophen (NORCO) 5-325 MG per tablet Take 1 tablet by mouth Every 6 (Six) Hours As Needed for Moderate Pain (4-6). 120 tablet 0     No facility-administered medications prior to visit.      Review of Systems   Constitutional: Negative.  Negative for activity change, appetite change and unexpected weight change.   HENT: Negative.  Negative for ear pain, hearing loss, nosebleeds, rhinorrhea, sinus pressure, sneezing, tinnitus and trouble swallowing.    Eyes: Negative.  Negative for pain, discharge, redness, itching and visual disturbance.   Respiratory: Negative.  Negative for apnea, chest tightness and wheezing.    Cardiovascular: Negative.  Negative for leg swelling.   Gastrointestinal: Negative.  Negative for abdominal distention, constipation and diarrhea.   Endocrine: Negative.    Genitourinary: Negative.  Negative for dysuria.   Musculoskeletal: Positive for arthralgias, arthritis, back pain, neck pain and stiffness. Negative for gait problem and neck stiffness.   Skin: Negative.  Negative for color change.   Allergic/Immunologic: Negative.    Neurological: Positive for tingling and numbness. Negative for  "dizziness and light-headedness.   Hematological: Negative.  Negative for adenopathy.   Psychiatric/Behavioral: Negative.  Negative for dysphoric mood and sleep disturbance. The patient is not nervous/anxious.      Objective   Visit Vitals  /70 (BP Location: Left arm)   Pulse 73   Ht 170.2 cm (67\")   Wt 75 kg (165 lb 4.8 oz)   SpO2 98%   BMI 25.89 kg/m²      Physical Exam   Constitutional: She is oriented to person, place, and time. She appears well-developed and well-nourished. No distress.   HENT:   Head: Normocephalic and atraumatic.   Nose: Nose normal.   Mouth/Throat: Oropharynx is clear and moist.   Eyes: Conjunctivae and EOM are normal. Pupils are equal, round, and reactive to light. Right eye exhibits no discharge. Left eye exhibits no discharge.   Neck: No thyromegaly present.   Cardiovascular: Normal rate, regular rhythm, normal heart sounds and intact distal pulses.   Pulmonary/Chest: Effort normal and breath sounds normal.   Musculoskeletal:        Right shoulder: She exhibits tenderness.        Left shoulder: She exhibits tenderness.        Left knee: Tenderness found.        Lumbar back: She exhibits decreased range of motion and tenderness.   Lymphadenopathy:     She has no cervical adenopathy.   Neurological: She is alert and oriented to person, place, and time.   Skin: Skin is warm and dry.   Psychiatric: She has a normal mood and affect.   Nursing note and vitals reviewed.    Assessment/Plan   Problem List Items Addressed This Visit        Musculoskeletal and Integument    Degenerative joint disease involving multiple joints on both sides of body (Chronic)    Relevant Medications    HYDROcodone-acetaminophen (NORCO) 5-325 MG per tablet    Other Relevant Orders    Ambulatory Referral to Pain Management (Completed)    Degenerative disc disease, lumbar (Chronic)    Relevant Medications    HYDROcodone-acetaminophen (NORCO) 5-325 MG per tablet    Other Relevant Orders    Ambulatory Referral to Pain " Management (Completed)      Other Visit Diagnoses     Cervical disc disease    -  Primary    Relevant Orders    Ambulatory Referral to Pain Management (Completed)        Referral to pain clinic. Ricki reviewed and appropriate. Not recommended to drive or operate heavy equipment while taking potentially sedating meds. Patient understands the risks associated with this controlled medication, including tolerance and addiction. They also agree to obtain this medication only from me, and not from a another provider, unless that provider is covering for me in my absence. They also agree to be compliant in dosing, and not self adjust the dose of medication.  A signed controlled substance agreement is on file, and they have received a controlled substance education sheet at this or a previous visit. They have also signed a consent for treatment with a controlled substance as per [facility] policy.      New Medications Ordered This Visit   Medications   • HYDROcodone-acetaminophen (NORCO) 5-325 MG per tablet     Sig: Take 1 tablet by mouth Every 6 (Six) Hours As Needed for Moderate Pain (4-6).     Dispense:  120 tablet     Refill:  0     Return for Next scheduled follow up.

## 2019-08-08 LAB — BACTERIA SPEC AEROBE CULT: ABNORMAL

## 2019-08-09 RX ORDER — NITROFURANTOIN 25; 75 MG/1; MG/1
100 CAPSULE ORAL 2 TIMES DAILY
Qty: 14 CAPSULE | Refills: 0 | Status: SHIPPED | OUTPATIENT
Start: 2019-08-09 | End: 2019-08-26

## 2019-08-12 ENCOUNTER — OFFICE VISIT (OUTPATIENT)
Dept: PODIATRY | Facility: CLINIC | Age: 74
End: 2019-08-12

## 2019-08-12 VITALS — BODY MASS INDEX: 25.9 KG/M2 | HEART RATE: 58 BPM | OXYGEN SATURATION: 98 % | HEIGHT: 67 IN | WEIGHT: 165 LBS

## 2019-08-12 DIAGNOSIS — M79.672 LEFT FOOT PAIN: ICD-10-CM

## 2019-08-12 DIAGNOSIS — M20.42 HAMMER TOE OF LEFT FOOT: ICD-10-CM

## 2019-08-12 DIAGNOSIS — M20.12 HALLUX VALGUS OF LEFT FOOT: Primary | ICD-10-CM

## 2019-08-12 PROCEDURE — 99214 OFFICE O/P EST MOD 30 MIN: CPT | Performed by: PODIATRIST

## 2019-08-12 NOTE — PROGRESS NOTES
Jaleesa Nam  1945  74 y.o. female     Patient presents to clinic today for discussion for surgery .     08/12/2019    Chief Complaint   Patient presents with   • Left Foot - Follow-up           History of Present Illness    Jaleesa Nam is a 74 y.o. female who presents for follow-up evaluation of left foot pain related to bunion and hammertoe deformities.  He feels that she is ready to pursue surgical correction.    Past Medical History:   Diagnosis Date   • Abrasion     Abrasion and/or friction burn      • Acute bronchitis    • Acute laryngitis     probably viral   • Acute maxillary sinusitis    • Acute pharyngitis    • Acute serous otitis media    • Acute sinusitis    • Acute urinary tract infection    • Ankle edema    • Aortic valve regurgitation    • Astigmatism    • Breast cancer (CMS/HCC)    • Bunion    • Chest pain    • Chest pain    • Contusion of foot    • Cough    • Diastolic dysfunction    • Dyspnea    • Encounter for general adult medical examination without abnormal findings    • Encounter for routine adult health examination    • Essential hypertension    • Gastroesophageal reflux disease    • Hammer toe    • Heart murmur    • Hip pain    • Hyperlipidemia    • Low back pain    • Malaise and fatigue    • Mammogram abnormal    • Menopause    • Myopia    • Need for immunization against influenza    • Need for prophylactic vaccination against Streptococcus pneumoniae (pneumococcus)    • Nuclear cataract    • Osteoarthritis    • Palpitations    • Plantar fasciitis    • Posterior subcapsular polar senile cataract    • Primary fibromyalgia syndrome    • Sciatica    • Screening for osteoporosis    • Screening mammogram, encounter for    • Shoulder pain    • Tear film insufficiency    • Trochanteric bursitis    • Upper respiratory infection    • Urinary tract infectious disease    • Vitamin D deficiency          Past Surgical History:   Procedure Laterality Date   • BREAST BIOPSY   02/10/2011    Stereotactic breast biopsy (3)    Sterotactic location guide localization of the abnormality with wire placement. Left lumpectomy. Faxigram.    • BREAST LUMPECTOMY     • CARDIAC CATHETERIZATION  10/01/2010    Cardiac cath 34103 (1)    Minimal plaquing in the first diagonal branch with evidence of good POORNIMA 3 flow. Preserved left ventricular systolic function with ejection fraction of 55%.    • CERVICAL SPINE SURGERY  06/09/1993    Cervical spine disk surgery (1)    C5-C6 anterior cervical discectomy and fusion with iliac bone graft.    • ENDOSCOPY  10/07/2015    Colon endoscopy 32213 (2)    negative cologard    • HIP ARTHROPLASTY  09/12/2011    Anesth, hip joint procedure (1)    Right total hip arthroplasty with adductor tenotomy. Osteoarthritis of the right hip.    • INJECTION OF MEDICATION  10/01/2014    Kenalog (4)      • INJECTION OF MEDICATION  09/03/2012    Rocephin (1)      • KNEE SURGERY  02/03/2016    Knee Surgery (1)    Left total knee arthroplasty.    • NECK SURGERY      Neck spinal fusion (1)      • NOSE SURGERY  11/18/1978    Treat nasal (nose) fracture (1) Nasal fracture reduction with splint fixation.    • OTHER SURGICAL HISTORY  06/22/2010    Correction of bunion (1)    Hallux valgus deformity of right foot. Base wedge bunionectomy of right foot.    • PAP SMEAR  12/10/2008   • SHOULDER SURGERY  06/17/2013    Shoulder arthroscopy/surgery (2)    Arthroscopy of the right shoulder with rotator cuff repair.    • TOTAL ABDOMINAL HYSTERECTOMY WITH SALPINGO OOPHORECTOMY           Family History   Problem Relation Age of Onset   • Coronary artery disease Other    • Hypertension Other    • Cancer Other         lung, stomach, bladder   • Arthritis Mother    • Hypertension Mother    • Heart disease Mother    • Hyperlipidemia Mother    • Cancer Maternal Grandmother    • Colon cancer Maternal Grandmother    • Cancer Maternal Grandfather          Social History     Socioeconomic History   • Marital  "status:      Spouse name: Not on file   • Number of children: Not on file   • Years of education: Not on file   • Highest education level: Not on file   Tobacco Use   • Smoking status: Never Smoker   • Smokeless tobacco: Never Used         Current Outpatient Medications   Medication Sig Dispense Refill   • amitriptyline (ELAVIL) 25 MG tablet TAKE ONE TABLET BY MOUTH EVERY NIGHT. 90 tablet 2   • amLODIPine (NORVASC) 5 MG tablet TAKE ONE TABLET BY MOUTH EVERY DAY 90 tablet 2   • calcium carbonate-vitamin d (CALTRATE 600+D) 600-400 MG-UNIT per tablet Take 1 tablet by mouth daily.     • HYDROcodone-acetaminophen (NORCO) 5-325 MG per tablet Take 1 tablet by mouth Every 6 (Six) Hours As Needed for Moderate Pain (4-6). 120 tablet 0   • lisinopril (PRINIVIL,ZESTRIL) 20 MG tablet TAKE ONE TABLET BY MOUTH EVERY DAY 90 tablet 3   • nitrofurantoin, macrocrystal-monohydrate, (MACROBID) 100 MG capsule Take 1 capsule by mouth 2 (Two) Times a Day. 14 capsule 0   • sulfamethoxazole-trimethoprim (BACTRIM DS) 800-160 MG per tablet Take 1 tablet by mouth 2 (Two) Times a Day. 10 tablet 0     No current facility-administered medications for this visit.          OBJECTIVE    Pulse 58   Ht 170.2 cm (67\")   Wt 74.8 kg (165 lb)   SpO2 98%   BMI 25.84 kg/m²       Review of Systems   Constitutional: Negative.    HENT: Positive for hearing loss.    Eyes: Negative.    Respiratory: Negative.    Cardiovascular: Positive for leg swelling.   Gastrointestinal: Negative.    Endocrine: Negative.    Genitourinary: Negative.    Musculoskeletal: Positive for arthralgias, back pain and joint swelling.        Foot pain, joint pain   Skin: Negative.    Allergic/Immunologic: Negative.    Neurological: Positive for numbness.   Hematological: Negative.    Psychiatric/Behavioral: Negative.          Physical Exam   Constitutional: she appears well-developed and well-nourished.   HEENT: Normocephalic. Atraumatic  CV: No CP. RRR  Resp: Non-labored " respirations  Psychiatric: she has a normal mood and affect. her behavior is normal.         Lower Extremity Exam:  Vascular: DP/PT pulses palpable 2+.   Minimal left foot edema  Foot warm  CFT wnl  Neuro: Protective sensation intact, b/l.  DTRs intact  Integument: No open wounds or lesions.  No erythema, scaling  Skin quality normal  Musculoskeletal: LE muscle strength 5/5.   Gait normal  Ankle ROM decreased without pain or crepitus  STJ ROM full without pain or crepitus  1st MTPJ ROM decreased with mild tenderness. Mild to moderate HAV  +Dorsomedial 1st mtpj eminence. Mild tenderness on palpation.  Semirigid hammertoe deformities toes 2 and 3 on left.  Positive tenderness palpation of left second toe.  Tenderness to palpation of plantar second MTPJ.              ASSESSMENT AND PLAN    Jaleesa was seen today for follow-up.    Diagnoses and all orders for this visit:    Hallux valgus of left foot  -     Case Request  -     ceFAZolin (ANCEF) 2 g in sodium chloride 0.9 % 100 mL IVPB    Hammer toe of left foot  -     Case Request  -     ceFAZolin (ANCEF) 2 g in sodium chloride 0.9 % 100 mL IVPB    Left foot pain    Other orders  -     Follow Anesthesia Guidelines / Standing Orders; Future  -     Obtain Informed Consent; Future  -     Follow Anesthesia Guidelines / Standing Orders; Standing  -     Provide Instructions to Patient Regarding NPO Status; Future  -     Verify NPO Status; Standing  -     Obtain Informed Consent (If Not Done Inpatient or PAT); Standing      -Comprehensive foot and ankle exam performed  -Radiographs reviewed  -Educated pt on diagnosis, etiology and treatment of hallux abducto valgus concurrent painful hammertoe deformity..  -Cont soft, wide toe box accommodative shoe gear.  -NSAIDs, toe padding and sleeves as needed  -Patient now interested in surgical correction.  Specifically we did discuss distal bunionectomy with second hammertoe correction and possible Weil osteotomy.  Had at length  discussion regarding moderate bunion deformity having slightly more optimal correction with proximal base wedge versus Lapidus however this is coupled with longer recovery and need for nonweightbearing.  Patient understands and wished to proceed with a more distal bunion procedure.  We also discussed all risks, benefits and potential complications including delayed healing, risk of infection, nonunion, persistent pain and need for further procedures, DVT/PE.  -Plan to proceed 9/11/2019  -Recheck 2 weeks            This document has been electronically signed by Williams Perry DPM on August 15, 2019 7:39 PM     EMR Dragon/Transcription disclaimer:   Much of this encounter note is an electronic transcription/translation of spoken language to printed text. The electronic translation of spoken language may permit erroneous, or at times, nonsensical words or phrases to be inadvertently transcribed; Although I have reviewed the note for such errors, some may still exist.    Williams Perry DPM  8/15/2019  7:39 PM

## 2019-08-14 DIAGNOSIS — I10 ESSENTIAL HYPERTENSION: Primary | Chronic | ICD-10-CM

## 2019-08-14 PROBLEM — M20.12 HALLUX VALGUS OF LEFT FOOT: Status: ACTIVE | Noted: 2019-08-14

## 2019-08-14 PROBLEM — M20.42 HAMMER TOE OF LEFT FOOT: Status: ACTIVE | Noted: 2019-08-14

## 2019-08-15 RX ORDER — BUPIVACAINE HCL/0.9 % NACL/PF 0.1 %
2 PLASTIC BAG, INJECTION (ML) EPIDURAL ONCE
Status: CANCELLED | OUTPATIENT
Start: 2019-09-11 | End: 2019-08-15

## 2019-08-21 ENCOUNTER — TELEPHONE (OUTPATIENT)
Dept: FAMILY MEDICINE CLINIC | Facility: CLINIC | Age: 74
End: 2019-08-21

## 2019-08-26 ENCOUNTER — OFFICE VISIT (OUTPATIENT)
Dept: FAMILY MEDICINE CLINIC | Facility: CLINIC | Age: 74
End: 2019-08-26

## 2019-08-26 ENCOUNTER — LAB (OUTPATIENT)
Dept: LAB | Facility: HOSPITAL | Age: 74
End: 2019-08-26

## 2019-08-26 VITALS
OXYGEN SATURATION: 99 % | HEART RATE: 67 BPM | WEIGHT: 166.3 LBS | DIASTOLIC BLOOD PRESSURE: 70 MMHG | BODY MASS INDEX: 26.1 KG/M2 | HEIGHT: 67 IN | SYSTOLIC BLOOD PRESSURE: 130 MMHG

## 2019-08-26 DIAGNOSIS — I10 ESSENTIAL HYPERTENSION: ICD-10-CM

## 2019-08-26 DIAGNOSIS — M50.90 CERVICAL DISC DISEASE: Chronic | ICD-10-CM

## 2019-08-26 DIAGNOSIS — M20.42 HAMMER TOE OF LEFT FOOT: ICD-10-CM

## 2019-08-26 DIAGNOSIS — M20.12 HALLUX VALGUS OF LEFT FOOT: ICD-10-CM

## 2019-08-26 DIAGNOSIS — M51.36 DEGENERATIVE DISC DISEASE, LUMBAR: Chronic | ICD-10-CM

## 2019-08-26 DIAGNOSIS — I10 ESSENTIAL HYPERTENSION: Chronic | ICD-10-CM

## 2019-08-26 DIAGNOSIS — Z00.00 MEDICARE ANNUAL WELLNESS VISIT, SUBSEQUENT: Primary | ICD-10-CM

## 2019-08-26 DIAGNOSIS — M15.9 DEGENERATIVE JOINT DISEASE INVOLVING MULTIPLE JOINTS ON BOTH SIDES OF BODY: Chronic | ICD-10-CM

## 2019-08-26 DIAGNOSIS — Z12.31 ENCOUNTER FOR SCREENING MAMMOGRAM FOR MALIGNANT NEOPLASM OF BREAST: ICD-10-CM

## 2019-08-26 DIAGNOSIS — Z78.0 POST-MENOPAUSAL: ICD-10-CM

## 2019-08-26 DIAGNOSIS — M85.852 OSTEOPENIA OF NECK OF LEFT FEMUR: Chronic | ICD-10-CM

## 2019-08-26 LAB
ALBUMIN SERPL-MCNC: 4.5 G/DL (ref 3.5–5.2)
ALBUMIN/GLOB SERPL: 1.3 G/DL
ALP SERPL-CCNC: 115 U/L (ref 39–117)
ALT SERPL W P-5'-P-CCNC: 14 U/L (ref 1–33)
ANION GAP SERPL CALCULATED.3IONS-SCNC: 13.7 MMOL/L (ref 5–15)
AST SERPL-CCNC: 24 U/L (ref 1–32)
BACTERIA UR QL AUTO: ABNORMAL /HPF
BILIRUB SERPL-MCNC: 0.5 MG/DL (ref 0.2–1.2)
BILIRUB UR QL STRIP: NEGATIVE
BUN BLD-MCNC: 20 MG/DL (ref 8–23)
BUN/CREAT SERPL: 18.9 (ref 7–25)
CALCIUM SPEC-SCNC: 9.5 MG/DL (ref 8.6–10.5)
CHLORIDE SERPL-SCNC: 99 MMOL/L (ref 98–107)
CHOLEST SERPL-MCNC: 189 MG/DL (ref 0–200)
CLARITY UR: CLEAR
CO2 SERPL-SCNC: 25.3 MMOL/L (ref 22–29)
COD CRY URNS QL: ABNORMAL /HPF
COLOR UR: YELLOW
CREAT BLD-MCNC: 1.06 MG/DL (ref 0.57–1)
GFR SERPL CREATININE-BSD FRML MDRD: 51 ML/MIN/1.73
GLOBULIN UR ELPH-MCNC: 3.5 GM/DL
GLUCOSE BLD-MCNC: 120 MG/DL (ref 65–99)
GLUCOSE UR STRIP-MCNC: NEGATIVE MG/DL
HDLC SERPL-MCNC: 50 MG/DL (ref 40–60)
HGB UR QL STRIP.AUTO: NEGATIVE
HYALINE CASTS UR QL AUTO: ABNORMAL /LPF
KETONES UR QL STRIP: NEGATIVE
LDLC SERPL CALC-MCNC: 105 MG/DL (ref 0–100)
LDLC/HDLC SERPL: 2.1 {RATIO}
LEUKOCYTE ESTERASE UR QL STRIP.AUTO: ABNORMAL
NITRITE UR QL STRIP: NEGATIVE
PH UR STRIP.AUTO: 5.5 [PH] (ref 5–8)
POTASSIUM BLD-SCNC: 4.2 MMOL/L (ref 3.5–5.2)
PROT SERPL-MCNC: 8 G/DL (ref 6–8.5)
PROT UR QL STRIP: NEGATIVE
RBC # UR: ABNORMAL /HPF
REF LAB TEST METHOD: ABNORMAL
SODIUM BLD-SCNC: 138 MMOL/L (ref 136–145)
SP GR UR STRIP: 1.02 (ref 1–1.03)
SQUAMOUS #/AREA URNS HPF: ABNORMAL /HPF
TRIGL SERPL-MCNC: 170 MG/DL (ref 0–150)
UROBILINOGEN UR QL STRIP: ABNORMAL
VLDLC SERPL-MCNC: 34 MG/DL (ref 5–40)
WBC UR QL AUTO: ABNORMAL /HPF

## 2019-08-26 PROCEDURE — 36415 COLL VENOUS BLD VENIPUNCTURE: CPT

## 2019-08-26 PROCEDURE — G0439 PPPS, SUBSEQ VISIT: HCPCS | Performed by: GENERAL PRACTICE

## 2019-08-26 PROCEDURE — 87086 URINE CULTURE/COLONY COUNT: CPT

## 2019-08-26 PROCEDURE — 81001 URINALYSIS AUTO W/SCOPE: CPT

## 2019-08-26 PROCEDURE — 80061 LIPID PANEL: CPT

## 2019-08-26 PROCEDURE — 99214 OFFICE O/P EST MOD 30 MIN: CPT | Performed by: GENERAL PRACTICE

## 2019-08-26 PROCEDURE — 80053 COMPREHEN METABOLIC PANEL: CPT

## 2019-08-26 RX ORDER — HYDROCODONE BITARTRATE AND ACETAMINOPHEN 5; 325 MG/1; MG/1
1 TABLET ORAL EVERY 6 HOURS PRN
Qty: 120 TABLET | Refills: 0 | Status: SHIPPED | OUTPATIENT
Start: 2019-08-26 | End: 2019-10-15

## 2019-08-26 NOTE — PROGRESS NOTES
Subjective   Jaleesa Nam is a 74 y.o. female.     Chief Complaint   Patient presents with   • Annual Exam     labs [place] [facility] medicare wellness     For review and evaluation of management of chronic medical problems. Labs reviewed. Due for mammogram and bone density. Scheduled for left foot surgery on Sept. 11th, 2019.  Back Pain   This is a chronic problem. The current episode started more than 1 year ago. The problem occurs constantly. The problem is unchanged. The pain is present in the lumbar spine. The pain does not radiate. The pain is at a severity of 9/10. The pain is moderate. The pain is worse during the day. The symptoms are aggravated by bending, position, twisting and standing. Pertinent negatives include no abdominal pain, chest pain, dysuria, fever, headaches, numbness or weakness. She has tried analgesics for the symptoms. The treatment provided significant relief.   Hypertension   This is a chronic problem. The current episode started more than 1 year ago. The problem is unchanged. The problem is controlled. Associated symptoms include neck pain. Pertinent negatives include no chest pain, headaches, palpitations or shortness of breath. There are no associated agents to hypertension. There are no known risk factors for coronary artery disease. Current antihypertension treatment includes ACE inhibitors and diuretics. The current treatment provides significant improvement. There are no compliance problems.    Arthritis   Presents for follow-up visit. She complains of pain, stiffness and joint warmth. She reports no joint swelling. The symptoms have been stable. Affected locations include the right shoulder, left shoulder, left MCP, right MCP, left knee, right knee, right hip, left hip and neck. Her pain is at a severity of 9/10. Associated symptoms include pain at night. Pertinent negatives include no diarrhea, dysuria, fatigue, fever, rash or Raynaud's syndrome. Compliance with total regimen  is %. Compliance with medications is %.      The following portions of the patient's history were reviewed and updated as appropriate: allergies, current medications, past family and social history and problem list.    Outpatient Medications Prior to Visit   Medication Sig Dispense Refill   • amitriptyline (ELAVIL) 25 MG tablet TAKE ONE TABLET BY MOUTH EVERY NIGHT. 90 tablet 2   • amLODIPine (NORVASC) 5 MG tablet TAKE ONE TABLET BY MOUTH EVERY DAY 90 tablet 2   • calcium carbonate-vitamin d (CALTRATE 600+D) 600-400 MG-UNIT per tablet Take 1 tablet by mouth daily.     • lisinopril (PRINIVIL,ZESTRIL) 20 MG tablet TAKE ONE TABLET BY MOUTH EVERY DAY 90 tablet 3   • HYDROcodone-acetaminophen (NORCO) 5-325 MG per tablet Take 1 tablet by mouth Every 6 (Six) Hours As Needed for Moderate Pain (4-6). 120 tablet 0   • nitrofurantoin, macrocrystal-monohydrate, (MACROBID) 100 MG capsule Take 1 capsule by mouth 2 (Two) Times a Day. 14 capsule 0   • sulfamethoxazole-trimethoprim (BACTRIM DS) 800-160 MG per tablet Take 1 tablet by mouth 2 (Two) Times a Day. 10 tablet 0     No facility-administered medications prior to visit.        Review of Systems   Constitutional: Negative.  Negative for chills, fatigue, fever and unexpected weight change.   HENT: Negative.  Negative for congestion, ear pain, hearing loss, nosebleeds, rhinorrhea, sneezing, sore throat and tinnitus.    Eyes: Negative.  Negative for discharge.   Respiratory: Negative.  Negative for cough, shortness of breath and wheezing.    Cardiovascular: Negative.  Negative for chest pain and palpitations.   Gastrointestinal: Negative.  Negative for abdominal pain, constipation, diarrhea, nausea and vomiting.   Endocrine: Negative.    Genitourinary: Negative.  Negative for dysuria, frequency and urgency.   Musculoskeletal: Positive for arthralgias, arthritis, back pain, neck pain and stiffness. Negative for joint swelling and myalgias.   Skin: Negative.  Negative  "for rash.   Allergic/Immunologic: Negative.    Neurological: Negative.  Negative for dizziness, weakness, numbness and headaches.   Hematological: Negative.  Negative for adenopathy.   Psychiatric/Behavioral: Negative.  Negative for dysphoric mood and sleep disturbance. The patient is not nervous/anxious.      Objective     Visit Vitals  /70   Pulse 67   Ht 168.9 cm (66.5\")   Wt 75.4 kg (166 lb 4.8 oz)   SpO2 99%   BMI 26.44 kg/m²     Physical Exam   Constitutional: She is oriented to person, place, and time. She appears well-developed and well-nourished. No distress.   HENT:   Head: Normocephalic and atraumatic.   Nose: Nose normal.   Mouth/Throat: Oropharynx is clear and moist.   Eyes: Conjunctivae and EOM are normal. Pupils are equal, round, and reactive to light. Right eye exhibits no discharge. Left eye exhibits no discharge.   Neck: Muscular tenderness present. Decreased range of motion present. No tracheal deviation present. No thyromegaly present.   Cardiovascular: Normal rate, regular rhythm, normal heart sounds and intact distal pulses.   No murmur heard.  Pulmonary/Chest: Effort normal and breath sounds normal. No respiratory distress. She has no wheezes. She has no rales. She exhibits no tenderness. Right breast exhibits no inverted nipple, no mass, no nipple discharge, no skin change and no tenderness. Left breast exhibits no inverted nipple, no mass, no nipple discharge, no skin change and no tenderness.   Abdominal: Soft. Bowel sounds are normal. She exhibits no distension and no mass. There is no tenderness. No hernia.   Musculoskeletal: She exhibits no deformity.        Right shoulder: She exhibits tenderness.        Left shoulder: She exhibits tenderness.        Left knee: Tenderness found.        Lumbar back: She exhibits tenderness.      Foot Structure and Biomechanics -   Her left foot exhibits hallux valgus and hammer toes.  Lymphadenopathy:     She has no cervical adenopathy. "   Neurological: She is alert and oriented to person, place, and time. She has normal reflexes.   Skin: Skin is warm and dry.   Psychiatric: She has a normal mood and affect. Her behavior is normal. Judgment and thought content normal.   Nursing note and vitals reviewed.     Assessment/Plan   Problem List Items Addressed This Visit        Cardiovascular and Mediastinum    Essential hypertension (Chronic)       Musculoskeletal and Integument    Degenerative joint disease involving multiple joints on both sides of body (Chronic)    Relevant Medications    HYDROcodone-acetaminophen (NORCO) 5-325 MG per tablet    Degenerative disc disease, lumbar (Chronic)    Relevant Medications    HYDROcodone-acetaminophen (NORCO) 5-325 MG per tablet    Cervical disc disease (Chronic)    Relevant Medications    HYDROcodone-acetaminophen (NORCO) 5-325 MG per tablet    Osteopenia of neck of left femur (Chronic)    Hallux valgus of left foot    Hammer toe of left foot      Other Visit Diagnoses     Medicare annual wellness visit, subsequent    -  Primary    Encounter for screening mammogram for malignant neoplasm of breast        Post-menopausal             Will notify regarding results. Low risk for proposed surgery. Routine intra and post-op monitoring.      Ricki reviewed and appropriate. Not recommended to drive or operate heavy equipment while taking potentially sedating meds.  Patient understands the risks associated with this controlled medication, including tolerance and addiction. They also agree to obtain this medication only from me, and not from a another provider, unless that provider is covering for me in my absence. They also agree to be compliant in dosing, and not self adjust the dose of medication.  A signed controlled substance agreement is on file, and they have received a controlled substance education sheet at this or a previous visit. They have also signed a consent for treatment with a controlled substance as per  [facility] policy.      New Medications Ordered This Visit   Medications   • HYDROcodone-acetaminophen (NORCO) 5-325 MG per tablet     Sig: Take 1 tablet by mouth Every 6 (Six) Hours As Needed for Moderate Pain (4-6).     Dispense:  120 tablet     Refill:  0     Return in about 2 months (around 10/26/2019) for Recheck.

## 2019-08-26 NOTE — PATIENT INSTRUCTIONS
Check at pharmacy on Shingrix shingles vaccine    Medicare Wellness  Personal Prevention Plan of Service     Date of Office Visit:  2019  Encounter Provider:  Emi Wright MD  Place of Service:  [facility]  Patient Name: Jaleesa Nam  :  1945    As part of the Medicare Wellness portion of your visit today, we are providing you with this personalized preventive plan of services (PPPS). This plan is based upon recommendations of the [place] Preventive Services Task Force (USPSTF) and the Advisory Committee on Immunization Practices (ACIP).    This lists the preventive care services that should be considered, and provides dates of when you are due. Items listed as completed are up-to-date and do not require any further intervention.    Health Maintenance   Topic Date Due   • ZOSTER VACCINE (2 of 2) 2017   • MEDICARE ANNUAL WELLNESS  2019   • LIPID PANEL  2019   • INFLUENZA VACCINE  2019   • MAMMOGRAM  2020   • COLOGUARD  2022   • TDAP/TD VACCINES (3 - Td) 10/26/2028   • HEPATITIS C SCREENING  Completed   • PNEUMOCOCCAL VACCINES (65+ LOW/MEDIUM RISK)  Completed       No orders of the defined types were placed in this encounter.      Return in about 2 months (around 10/26/2019) for Recheck.

## 2019-08-26 NOTE — PROGRESS NOTES
The ABCs of the Annual Wellness Visit  Subsequent Medicare Wellness Visit    Chief Complaint   Patient presents with   • Annual Exam     labs [place] medicare wellness       Subjective   History of Present Illness:  Jaleesa Nam is a 74 y.o. female who presents for a Subsequent Medicare Wellness Visit.    HEALTH RISK ASSESSMENT    Recent Hospitalizations:  No hospitalization(s) within the last year.    Current Medical Providers:  Patient Care Team:  Emi Wright MD as PCP - General  Emi Wright MD as PCP - Claims Attributed  Reji Simpson MD (Inactive) as Consulting Physician (Ophthalmology)  Shabbir Engel DMD as Consulting Physician (Dental General Practice)    Smoking Status:  Social History     Tobacco Use   Smoking Status Never Smoker   Smokeless Tobacco Never Used       Alcohol Consumption:  Social History     Substance and Sexual Activity   Alcohol Use Not on file       Depression Screen:   PHQ-2/PHQ-9 Depression Screening 8/26/2019   Little interest or pleasure in doing things 0   Feeling down, depressed, or hopeless 0   Trouble falling or staying asleep, or sleeping too much 0   Feeling tired or having little energy 0   Poor appetite or overeating 0   Feeling bad about yourself - or that you are a failure or have let yourself or your family down 0   Trouble concentrating on things, such as reading the newspaper or watching television 0   Moving or speaking so slowly that other people could have noticed. Or the opposite - being so fidgety or restless that you have been moving around a lot more than usual 0   Thoughts that you would be better off dead, or of hurting yourself in some way 0   Total Score 0       Fall Risk Screen:  STEADI Fall Risk Assessment was completed, and patient is at LOW risk for falls.Assessment completed on:8/26/2019    Health Habits and Functional and Cognitive Screening:  Functional & Cognitive Status 8/26/2019   Do you have difficulty preparing  food and eating? No   Do you have difficulty bathing yourself, getting dressed or grooming yourself? No   Do you have difficulty using the toilet? No   Do you have difficulty moving around from place to place? No   Do you have trouble with steps or getting out of a bed or a chair? No   Current Diet Well Balanced Diet   Dental Exam Up to date   Eye Exam Up to date   Exercise (times per week) 2 times per week   Current Exercise Activities Include Walking   Do you need help using the phone?  No   Are you deaf or do you have serious difficulty hearing?  Yes   Do you need help with transportation? No   Do you need help shopping? No   Do you need help preparing meals?  No   Do you need help with housework?  No   Do you need help with laundry? No   Do you need help taking your medications? No   Do you need help managing money? No   Do you ever drive or ride in a car without wearing a seat belt? No   Have you felt unusual stress, anger or loneliness in the last month? No   Who do you live with? Spouse   If you need help, do you have trouble finding someone available to you? No   Have you been bothered in the last four weeks by sexual problems? No   Do you have difficulty concentrating, remembering or making decisions? No         Does the patient have evidence of cognitive impairment? No    Asprin use counseling:Does not need ASA (and currently is not on it)    Age-appropriate Screening Schedule:  Refer to the list below for future screening recommendations based on patient's age, sex and/or medical conditions. Orders for these recommended tests are listed in the plan section. The patient has been provided with a written plan.    Health Maintenance   Topic Date Due   • ZOSTER VACCINE (2 of 2) 08/07/2017   • LIPID PANEL  07/02/2019   • INFLUENZA VACCINE  08/01/2019   • MAMMOGRAM  07/02/2020   • TDAP/TD VACCINES (3 - Td) 10/26/2028   • PNEUMOCOCCAL VACCINES (65+ LOW/MEDIUM RISK)  Completed          The following portions of  the patient's history were reviewed and updated as appropriate: allergies, current medications, past family history, past medical history, past social history, past surgical history and problem list.    Outpatient Medications Prior to Visit   Medication Sig Dispense Refill   • amitriptyline (ELAVIL) 25 MG tablet TAKE ONE TABLET BY MOUTH EVERY NIGHT. 90 tablet 2   • amLODIPine (NORVASC) 5 MG tablet TAKE ONE TABLET BY MOUTH EVERY DAY 90 tablet 2   • calcium carbonate-vitamin d (CALTRATE 600+D) 600-400 MG-UNIT per tablet Take 1 tablet by mouth daily.     • lisinopril (PRINIVIL,ZESTRIL) 20 MG tablet TAKE ONE TABLET BY MOUTH EVERY DAY 90 tablet 3   • HYDROcodone-acetaminophen (NORCO) 5-325 MG per tablet Take 1 tablet by mouth Every 6 (Six) Hours As Needed for Moderate Pain (4-6). 120 tablet 0   • nitrofurantoin, macrocrystal-monohydrate, (MACROBID) 100 MG capsule Take 1 capsule by mouth 2 (Two) Times a Day. 14 capsule 0   • sulfamethoxazole-trimethoprim (BACTRIM DS) 800-160 MG per tablet Take 1 tablet by mouth 2 (Two) Times a Day. 10 tablet 0     No facility-administered medications prior to visit.        Patient Active Problem List   Diagnosis   • Degenerative joint disease involving multiple joints on both sides of body   • Essential hypertension   • Degenerative disc disease, lumbar   • Foot pain, left   • Hallux valgus of left foot   • Hammer toe of left foot   • Cervical disc disease   • Osteopenia of neck of left femur       Advanced Care Planning:  Patient has an advance directive - a copy has been provided and is visible in patient header    Review of Systems   Constitutional: Negative.  Negative for chills, fatigue, fever and unexpected weight change.   HENT: Negative.  Negative for congestion, ear pain, hearing loss, nosebleeds, rhinorrhea, sneezing, sore throat and tinnitus.    Eyes: Negative.  Negative for discharge.   Respiratory: Negative.  Negative for cough, shortness of breath and wheezing.   "  Cardiovascular: Negative.  Negative for chest pain and palpitations.   Gastrointestinal: Negative.  Negative for abdominal pain, constipation, diarrhea, nausea and vomiting.   Endocrine: Negative.    Genitourinary: Negative.  Negative for dysuria, frequency and urgency.   Musculoskeletal: Negative.  Negative for arthralgias, back pain, joint swelling, myalgias and neck pain.   Skin: Negative.  Negative for rash.   Allergic/Immunologic: Negative.    Neurological: Negative.  Negative for dizziness, weakness, numbness and headaches.   Hematological: Negative.  Negative for adenopathy.   Psychiatric/Behavioral: Negative.  Negative for dysphoric mood and sleep disturbance. The patient is not nervous/anxious.        Compared to one year ago, the patient feels her physical health is the same.  Compared to one year ago, the patient feels her mental health is the same.    Reviewed chart for potential of high risk medication in the elderly: yes  Reviewed chart for potential of harmful drug interactions in the elderly:not applicable    Objective         Vitals:    08/26/19 0930   BP: 130/70   Pulse: 67   SpO2: 99%   Weight: 75.4 kg (166 lb 4.8 oz)   Height: 168.9 cm (66.5\")   PainSc:   9   PainLoc: Neck  Comment: and generalized all over       Body mass index is 26.44 kg/m².  Discussed the patient's BMI with her. The BMI is in the acceptable range.    Physical Exam   Constitutional: She is oriented to person, place, and time. She appears well-developed and well-nourished. No distress.   HENT:   Head: Normocephalic and atraumatic.   Nose: Nose normal.   Mouth/Throat: Oropharynx is clear and moist.   Eyes: Conjunctivae and EOM are normal. Pupils are equal, round, and reactive to light. Right eye exhibits no discharge. Left eye exhibits no discharge.   Neck: No thyromegaly present.   Cardiovascular: Normal rate, regular rhythm, normal heart sounds and intact distal pulses.   Pulmonary/Chest: Effort normal and breath sounds " normal.   Lymphadenopathy:     She has no cervical adenopathy.   Neurological: She is alert and oriented to person, place, and time.   Skin: Skin is warm and dry.   Psychiatric: She has a normal mood and affect.   Nursing note and vitals reviewed.    Assessment/Plan   Medicare Risks and Personalized Health Plan  CMS Preventative Services Quick Reference  Breast Cancer/Mammogram Screening  Immunizations Discussed/Encouraged (specific immunizations; Influenza and Shingrix )  Osteoprorosis Risk    The above risks/problems have been discussed with the patient.  Pertinent information has been shared with the patient in the After Visit Summary.  Follow up plans and orders are seen below in the Assessment/Plan Section.    Diagnoses and all orders for this visit:    1. Medicare annual wellness visit, subsequent (Primary)    2. Degenerative disc disease, lumbar  -     HYDROcodone-acetaminophen (NORCO) 5-325 MG per tablet; Take 1 tablet by mouth Every 6 (Six) Hours As Needed for Moderate Pain (4-6).  Dispense: 120 tablet; Refill: 0    3. Degenerative joint disease involving multiple joints on both sides of body  -     HYDROcodone-acetaminophen (NORCO) 5-325 MG per tablet; Take 1 tablet by mouth Every 6 (Six) Hours As Needed for Moderate Pain (4-6).  Dispense: 120 tablet; Refill: 0    4. Essential hypertension    5. Cervical disc disease  -     HYDROcodone-acetaminophen (NORCO) 5-325 MG per tablet; Take 1 tablet by mouth Every 6 (Six) Hours As Needed for Moderate Pain (4-6).  Dispense: 120 tablet; Refill: 0    6. Encounter for screening mammogram for malignant neoplasm of breast    7. Post-menopausal    8. Osteopenia of neck of left femur    9. Hammer toe of left foot    10. Hallux valgus of left foot      Follow Up:  Return in about 2 months (around 10/26/2019) for Recheck.     An After Visit Summary and PPPS were given to the patient.    Information has been scanned into chart.  Discussed importance of taking medications as  prescribed. Encouraged healthy eating habits with low fat, low salt choices and working towards maintaining a healthy weight. Recommended regular exercise if able as well as care to prevent falls including avoiding anything on the floor that they could slip or trip on such as throw rugs, making sure they have a bathmat to step onto when their feet are wet and having grab bars and railings where needed.

## 2019-08-27 LAB — BACTERIA SPEC AEROBE CULT: NO GROWTH

## 2019-08-28 ENCOUNTER — TELEPHONE (OUTPATIENT)
Dept: FAMILY MEDICINE CLINIC | Facility: CLINIC | Age: 74
End: 2019-08-28

## 2019-08-28 NOTE — PROGRESS NOTES
Per Dr. Wright, Ms. Nam has been called with recent lab results & recommendations.  Continue current medications and follow-up as planned or sooner if any problems.

## 2019-08-28 NOTE — TELEPHONE ENCOUNTER
Notes recorded by Jen Lopez LPN on 8/28/2019 at 11:04 AM CDT  Per Dr. Wright, Ms. Viv has been called with recent lab results & recommendations.  Continue current medications and follow-up as planned or sooner if any problems.  ------    Notes recorded by Emi Wright MD on 8/27/2019 at 5:04 PM CDT  Call and tell labs are stable, no uti

## 2019-09-05 ENCOUNTER — APPOINTMENT (OUTPATIENT)
Dept: PREADMISSION TESTING | Facility: HOSPITAL | Age: 74
End: 2019-09-05

## 2019-09-05 VITALS
DIASTOLIC BLOOD PRESSURE: 69 MMHG | OXYGEN SATURATION: 96 % | SYSTOLIC BLOOD PRESSURE: 140 MMHG | HEIGHT: 67 IN | RESPIRATION RATE: 12 BRPM | BODY MASS INDEX: 26.06 KG/M2 | WEIGHT: 166 LBS | HEART RATE: 61 BPM

## 2019-09-05 PROCEDURE — 93010 ELECTROCARDIOGRAM REPORT: CPT | Performed by: INTERNAL MEDICINE

## 2019-09-05 PROCEDURE — 93005 ELECTROCARDIOGRAM TRACING: CPT

## 2019-09-05 RX ORDER — AMITRIPTYLINE HYDROCHLORIDE 25 MG/1
25 TABLET, FILM COATED ORAL NIGHTLY
COMMUNITY
End: 2020-02-20 | Stop reason: SDUPTHER

## 2019-09-05 RX ORDER — SODIUM CHLORIDE, SODIUM GLUCONATE, SODIUM ACETATE, POTASSIUM CHLORIDE, AND MAGNESIUM CHLORIDE 526; 502; 368; 37; 30 MG/100ML; MG/100ML; MG/100ML; MG/100ML; MG/100ML
1000 INJECTION, SOLUTION INTRAVENOUS CONTINUOUS
Status: CANCELLED | OUTPATIENT
Start: 2019-09-11

## 2019-09-05 RX ORDER — AMLODIPINE BESYLATE 5 MG/1
5 TABLET ORAL DAILY
COMMUNITY
End: 2019-12-19

## 2019-09-05 RX ORDER — LISINOPRIL 20 MG/1
20 TABLET ORAL DAILY
COMMUNITY
End: 2020-06-10

## 2019-09-05 NOTE — DISCHARGE INSTRUCTIONS
[facility]  Pre-op Information and Guidelines    You will be called after 2 p.m. the day before your surgery (Friday for Monday surgery) and notified of your time for arrival and approximate surgery time.  If you have not received a call by 4P.M., please contact Same Day Surgery at [phone] of if outside [facility] call [phone].    Please Follow these Important Safety Guidelines:    • The morning of your procedure, take only the medications listed below with   A sip of water:_____________________________________________       ____AMLODIPINE, NORCO______________________    • DO NOT eat or drink anything after 12:00 midnight the night before surgery  Specific instructions concerning drinking clear liquids will be discussed during  the pre-surgery instruction call the day before your surgery.    • If you take a blood thinner (ex. Plavix, Coumadin, aspirin), ask your doctor when to stop it before surgery  STOP DATE: _________________    • Only 2 visitors are allowed in patient rooms at a time  Your visitors will be asked to wait in the lobby until the admission process is complete with the exception of a parent with a child and patients in need of special assistance.    • YOU CANNOT DRIVE YOURSELF HOME  You must be accompanied by someone who will be responsible for driving you home after surgery and for your care at home.    • DO NOT chew gum, use breath mints, hard candy, or smoke the day of surgery  • DO NOT drink alcohol for at least 24 hours before your surgery  • DO NOT wear any jewelry and remove all body piercing before coming to the hospital  • DO NOT wear make-up to the hospital  • If you are having surgery on an extremity (arm/leg/foot) remove nail polish/artificial nails on the surgical side  • Clothing, glasses, contacts, dentures, and hairpieces must be removed before surgery  • Bathe the night before or the morning of your surgery and do not use powders/lotions on  skin.

## 2019-09-06 ENCOUNTER — TELEPHONE (OUTPATIENT)
Dept: FAMILY MEDICINE CLINIC | Facility: CLINIC | Age: 74
End: 2019-09-06

## 2019-09-06 NOTE — TELEPHONE ENCOUNTER
Pt's  called and needs to talk to you regarding an issue that he needs your help with. Phone is [phone].

## 2019-09-09 ENCOUNTER — OFFICE VISIT (OUTPATIENT)
Dept: FAMILY MEDICINE CLINIC | Facility: CLINIC | Age: 74
End: 2019-09-09

## 2019-09-09 VITALS
SYSTOLIC BLOOD PRESSURE: 130 MMHG | BODY MASS INDEX: 26.6 KG/M2 | WEIGHT: 169.5 LBS | HEART RATE: 60 BPM | HEIGHT: 67 IN | OXYGEN SATURATION: 98 % | DIASTOLIC BLOOD PRESSURE: 70 MMHG

## 2019-09-09 DIAGNOSIS — M50.90 CERVICAL DISC DISEASE: Primary | ICD-10-CM

## 2019-09-09 PROCEDURE — 99213 OFFICE O/P EST LOW 20 MIN: CPT | Performed by: GENERAL PRACTICE

## 2019-09-09 RX ORDER — TIZANIDINE 4 MG/1
4 TABLET ORAL EVERY 8 HOURS PRN
Qty: 30 TABLET | Refills: 1 | Status: SHIPPED | OUTPATIENT
Start: 2019-09-09 | End: 2020-05-01

## 2019-09-09 NOTE — PROGRESS NOTES
Subjective   Jaleesa Nam is a 74 y.o. female.   Chief Complaint   Patient presents with   • Neck Pain     Neck Pain    This is a chronic problem. The current episode started more than 1 year ago. The problem occurs constantly. The problem has been gradually worsening. The pain is associated with nothing. The pain is present in the midline, right side and left side. The pain is at a severity of 9/10. The pain is severe. The symptoms are aggravated by position and twisting. The pain is same all the time. Stiffness is present in the morning. Pertinent negatives include no chest pain, fever, headaches, numbness, trouble swallowing or weakness. She has tried oral narcotics for the symptoms. The treatment provided moderate relief.      The following portions of the patient's history were reviewed and updated as appropriate: allergies, current medications, past social history and problem list.    Outpatient Medications Prior to Visit   Medication Sig Dispense Refill   • amitriptyline (ELAVIL) 25 MG tablet Take 25 mg by mouth Every Night.     • amLODIPine (NORVASC) 5 MG tablet Take 5 mg by mouth Daily.     • calcium carbonate-vitamin d (CALTRATE 600+D) 600-400 MG-UNIT per tablet Take 1 tablet by mouth daily.     • HYDROcodone-acetaminophen (NORCO) 5-325 MG per tablet Take 1 tablet by mouth Every 6 (Six) Hours As Needed for Moderate Pain (4-6). 120 tablet 0   • lisinopril (PRINIVIL,ZESTRIL) 20 MG tablet Take 20 mg by mouth Daily.       No facility-administered medications prior to visit.        Review of Systems   Constitutional: Negative.  Negative for chills, fatigue, fever and unexpected weight change.   HENT: Negative.  Negative for congestion, ear pain, hearing loss, nosebleeds, rhinorrhea, sneezing, sore throat, tinnitus and trouble swallowing.    Eyes: Negative.  Negative for discharge.   Respiratory: Negative.  Negative for cough, shortness of breath and wheezing.    Cardiovascular: Negative.  Negative for  "chest pain and palpitations.   Gastrointestinal: Negative.  Negative for abdominal pain, constipation, diarrhea, nausea and vomiting.   Endocrine: Negative.    Genitourinary: Negative.  Negative for dysuria, frequency and urgency.   Musculoskeletal: Positive for neck pain. Negative for arthralgias, back pain, joint swelling and myalgias.   Skin: Negative.  Negative for rash.   Allergic/Immunologic: Negative.    Neurological: Negative.  Negative for dizziness, weakness, numbness and headaches.   Hematological: Negative.  Negative for adenopathy.   Psychiatric/Behavioral: Negative.  Negative for dysphoric mood and sleep disturbance. The patient is not nervous/anxious.        Objective   Visit Vitals  /70   Pulse 60   Ht 168.9 cm (66.5\")   Wt 76.9 kg (169 lb 8 oz)   SpO2 98%   BMI 26.95 kg/m²     Physical Exam   Constitutional: She is oriented to person, place, and time. She appears well-developed and well-nourished. No distress.   HENT:   Head: Normocephalic and atraumatic.   Nose: Nose normal.   Mouth/Throat: Oropharynx is clear and moist.   Eyes: Conjunctivae and EOM are normal. Pupils are equal, round, and reactive to light. Right eye exhibits no discharge. Left eye exhibits no discharge.   Neck: Muscular tenderness present. Decreased range of motion present. No thyromegaly present.   Cardiovascular: Normal rate, regular rhythm, normal heart sounds and intact distal pulses.   Pulmonary/Chest: Effort normal and breath sounds normal.   Lymphadenopathy:     She has no cervical adenopathy.   Neurological: She is alert and oriented to person, place, and time.   Skin: Skin is warm and dry.   Psychiatric: She has a normal mood and affect.   Nursing note and vitals reviewed.      Assessment/Plan   Problem List Items Addressed This Visit        Musculoskeletal and Integument    Cervical disc disease - Primary (Chronic)         Scheduled for foot surgery in 2 days so steroids contraindicated. Advised gentle stretches " to try to increase range of motion. May benefit from physical therapy.    New Medications Ordered This Visit   Medications   • tiZANidine (ZANAFLEX) 4 MG tablet     Sig: Take 1 tablet by mouth Every 8 (Eight) Hours As Needed for Muscle Spasms.     Dispense:  30 tablet     Refill:  1     Return for Next scheduled follow up.

## 2019-09-11 ENCOUNTER — APPOINTMENT (OUTPATIENT)
Dept: GENERAL RADIOLOGY | Facility: HOSPITAL | Age: 74
End: 2019-09-11

## 2019-09-11 ENCOUNTER — ANESTHESIA (OUTPATIENT)
Dept: PERIOP | Facility: HOSPITAL | Age: 74
End: 2019-09-11

## 2019-09-11 ENCOUNTER — ANESTHESIA EVENT (OUTPATIENT)
Dept: PERIOP | Facility: HOSPITAL | Age: 74
End: 2019-09-11

## 2019-09-11 ENCOUNTER — HOSPITAL ENCOUNTER (OUTPATIENT)
Facility: HOSPITAL | Age: 74
Setting detail: HOSPITAL OUTPATIENT SURGERY
Discharge: HOME | End: 2019-09-11
Attending: PODIATRIST | Admitting: PODIATRIST

## 2019-09-11 VITALS
HEIGHT: 67 IN | TEMPERATURE: 97 F | OXYGEN SATURATION: 94 % | SYSTOLIC BLOOD PRESSURE: 154 MMHG | HEART RATE: 55 BPM | BODY MASS INDEX: 26.06 KG/M2 | DIASTOLIC BLOOD PRESSURE: 73 MMHG | RESPIRATION RATE: 18 BRPM | WEIGHT: 166.01 LBS

## 2019-09-11 DIAGNOSIS — M20.12 HALLUX VALGUS OF LEFT FOOT: ICD-10-CM

## 2019-09-11 DIAGNOSIS — M20.42 HAMMER TOE OF LEFT FOOT: ICD-10-CM

## 2019-09-11 PROCEDURE — C1713 ANCHOR/SCREW BN/BN,TIS/BN: HCPCS | Performed by: PODIATRIST

## 2019-09-11 PROCEDURE — 28296 COR HLX VLGS DSTL MTAR OSTEO: CPT | Performed by: PODIATRIST

## 2019-09-11 PROCEDURE — 25010000002 PROPOFOL 10 MG/ML EMULSION: Performed by: NURSE ANESTHETIST, CERTIFIED REGISTERED

## 2019-09-11 PROCEDURE — 28285 REPAIR OF HAMMERTOE: CPT | Performed by: PODIATRIST

## 2019-09-11 PROCEDURE — 76000 FLUOROSCOPY <1 HR PHYS/QHP: CPT

## 2019-09-11 PROCEDURE — 25010000002 FENTANYL CITRATE (PF) 100 MCG/2ML SOLUTION: Performed by: NURSE ANESTHETIST, CERTIFIED REGISTERED

## 2019-09-11 PROCEDURE — C1776 JOINT DEVICE (IMPLANTABLE): HCPCS | Performed by: PODIATRIST

## 2019-09-11 PROCEDURE — 25010000003 LIDOCAINE 1 % SOLUTION: Performed by: NURSE ANESTHETIST, CERTIFIED REGISTERED

## 2019-09-11 PROCEDURE — 28308 INCISION OF METATARSAL: CPT | Performed by: PODIATRIST

## 2019-09-11 PROCEDURE — 25010000002 DEXAMETHASONE PER 1 MG: Performed by: NURSE ANESTHETIST, CERTIFIED REGISTERED

## 2019-09-11 PROCEDURE — 25010000002 ONDANSETRON PER 1 MG: Performed by: NURSE ANESTHETIST, CERTIFIED REGISTERED

## 2019-09-11 DEVICE — CANNULATED SCREW
Type: IMPLANTABLE DEVICE | Site: FOOT | Status: FUNCTIONAL
Brand: ASNIS

## 2019-09-11 DEVICE — INTRAMEDULLARY ARTHRODESIS IMPLANT
Type: IMPLANTABLE DEVICE | Site: FOOT | Status: FUNCTIONAL
Brand: SMART TOE

## 2019-09-11 RX ORDER — SODIUM CHLORIDE, SODIUM GLUCONATE, SODIUM ACETATE, POTASSIUM CHLORIDE, AND MAGNESIUM CHLORIDE 526; 502; 368; 37; 30 MG/100ML; MG/100ML; MG/100ML; MG/100ML; MG/100ML
1000 INJECTION, SOLUTION INTRAVENOUS CONTINUOUS
Status: DISCONTINUED | OUTPATIENT
Start: 2019-09-11 | End: 2019-09-11 | Stop reason: HOSPADM

## 2019-09-11 RX ORDER — ONDANSETRON 2 MG/ML
INJECTION INTRAMUSCULAR; INTRAVENOUS AS NEEDED
Status: DISCONTINUED | OUTPATIENT
Start: 2019-09-11 | End: 2019-09-11 | Stop reason: SURG

## 2019-09-11 RX ORDER — FENTANYL CITRATE 50 UG/ML
INJECTION, SOLUTION INTRAMUSCULAR; INTRAVENOUS AS NEEDED
Status: DISCONTINUED | OUTPATIENT
Start: 2019-09-11 | End: 2019-09-11 | Stop reason: SURG

## 2019-09-11 RX ORDER — PROPOFOL 10 MG/ML
VIAL (ML) INTRAVENOUS AS NEEDED
Status: DISCONTINUED | OUTPATIENT
Start: 2019-09-11 | End: 2019-09-11 | Stop reason: SURG

## 2019-09-11 RX ORDER — BUPIVACAINE HYDROCHLORIDE 5 MG/ML
INJECTION, SOLUTION EPIDURAL; INTRACAUDAL AS NEEDED
Status: DISCONTINUED | OUTPATIENT
Start: 2019-09-11 | End: 2019-09-11 | Stop reason: HOSPADM

## 2019-09-11 RX ORDER — OXYCODONE AND ACETAMINOPHEN 7.5; 325 MG/1; MG/1
1 TABLET ORAL EVERY 6 HOURS PRN
Qty: 30 TABLET | Refills: 0 | Status: SHIPPED | OUTPATIENT
Start: 2019-09-11 | End: 2019-11-07

## 2019-09-11 RX ORDER — DEXAMETHASONE SODIUM PHOSPHATE 4 MG/ML
INJECTION, SOLUTION INTRA-ARTICULAR; INTRALESIONAL; INTRAMUSCULAR; INTRAVENOUS; SOFT TISSUE AS NEEDED
Status: DISCONTINUED | OUTPATIENT
Start: 2019-09-11 | End: 2019-09-11 | Stop reason: SURG

## 2019-09-11 RX ORDER — BUPIVACAINE HCL/0.9 % NACL/PF 0.1 %
2 PLASTIC BAG, INJECTION (ML) EPIDURAL ONCE
Status: COMPLETED | OUTPATIENT
Start: 2019-09-11 | End: 2019-09-11

## 2019-09-11 RX ORDER — ONDANSETRON 2 MG/ML
4 INJECTION INTRAMUSCULAR; INTRAVENOUS ONCE AS NEEDED
Status: DISCONTINUED | OUTPATIENT
Start: 2019-09-11 | End: 2019-09-11 | Stop reason: HOSPADM

## 2019-09-11 RX ORDER — LIDOCAINE HYDROCHLORIDE 10 MG/ML
INJECTION, SOLUTION INFILTRATION; PERINEURAL AS NEEDED
Status: DISCONTINUED | OUTPATIENT
Start: 2019-09-11 | End: 2019-09-11 | Stop reason: SURG

## 2019-09-11 RX ADMIN — DEXAMETHASONE SODIUM PHOSPHATE 4 MG: 4 INJECTION, SOLUTION INTRAMUSCULAR; INTRAVENOUS at 07:30

## 2019-09-11 RX ADMIN — FENTANYL CITRATE 25 MCG: 50 INJECTION, SOLUTION INTRAMUSCULAR; INTRAVENOUS at 08:45

## 2019-09-11 RX ADMIN — FENTANYL CITRATE 25 MCG: 50 INJECTION, SOLUTION INTRAMUSCULAR; INTRAVENOUS at 07:10

## 2019-09-11 RX ADMIN — LIDOCAINE HYDROCHLORIDE 50 MG: 10 INJECTION, SOLUTION INFILTRATION; PERINEURAL at 07:13

## 2019-09-11 RX ADMIN — SODIUM CHLORIDE, SODIUM GLUCONATE, SODIUM ACETATE, POTASSIUM CHLORIDE, AND MAGNESIUM CHLORIDE 1000 ML: 526; 502; 368; 37; 30 INJECTION, SOLUTION INTRAVENOUS at 06:28

## 2019-09-11 RX ADMIN — Medication 2 G: at 07:19

## 2019-09-11 RX ADMIN — PROPOFOL 200 MG: 10 INJECTION, EMULSION INTRAVENOUS at 07:13

## 2019-09-11 RX ADMIN — ONDANSETRON 4 MG: 2 INJECTION INTRAMUSCULAR; INTRAVENOUS at 07:30

## 2019-09-11 NOTE — INTERVAL H&P NOTE
No Known Allergies    H&P reviewed. The patient was examined and there are no changes to the H&P.   Proceed as planned.          This document has been electronically signed by Williams Perry DPM on September 11, 2019 7:03 AM

## 2019-09-11 NOTE — ANESTHESIA PREPROCEDURE EVALUATION
Anesthesia Evaluation     Patient summary reviewed   NPO Solid Status: > 8 hours  NPO Liquid Status: > 8 hours           Airway   Mallampati: II  TM distance: >3 FB  Neck ROM: full  No difficulty expected  Dental    (+) poor dentition    Pulmonary - normal exam   (+) shortness of breath, recent URI,   Cardiovascular - normal exam  Exercise tolerance: poor (<4 METS)    NYHA Classification: III  ECG reviewed    (+) hypertension, valvular problems/murmurs murmur, hyperlipidemia,       Neuro/Psych  (+) weakness, numbness, psychiatric history Anxiety,     GI/Hepatic/Renal/Endo    (+)  GERD,      Musculoskeletal     (+) myalgias,   Abdominal    Substance History      OB/GYN          Other   (+) arthritis   history of cancer                    Anesthesia Plan    ASA 3     general   (EF 65% on recent echo noted in preop.)  intravenous induction   Anesthetic plan, all risks, benefits, and alternatives have been provided, discussed and informed consent has been obtained with: patient.

## 2019-09-11 NOTE — OP NOTE
PREOPERATIVE DIAGNOSES:  1. Left foot hallux abducto valgus.   2. Left 2nd hammertoe deformity.  3. Foot contracture, left.     POSTOPERATIVE DIAGNOSES:  1. Left foot hallux abducto valgus.   2. Left 2nd hammertoe deformity.  3. Foot contracture, left.     PROCEDURES PERFORMED:  1. Reverdin bunionectomy, left foot.  2. Akin osteotomy, left hallux.  3. Weil osteotomy, left 2nd metatarsal.  4. Left 2nd hammertoe correction with proximal interphalangeal joint arthrodesis.     SURGEON: Williams Perry DPM    ASSISTANT: Sherin El MA    ANESTHESIA: General.     HEMOSTASIS: Left ankle pneumatic tourniquet inflated to 250 mmHg for approximately 60 minutes.     ESTIMATED BLOOD LOSS: Less than 10 mL.    MATERIALS:   1. [employer] Smart Toe.   2. Sergey micro Asnis 2.0 mm cannulated screw x 2.   3. Sergey micro Asnis 3.0 mm cannulated screw x 2.     INJECTABLES: 20 mL of 0.25% Marcaine plain.     COMPLICATIONS: None.     INDICATIONS FOR PROCEDURE: This is a patient who was seen on outpatient basis for evaluation of worsening forefoot pain related to digital contractures and deformity. The patient failed conservative care and presented today for surgical correction. All risks, benefits, potential complications were described in detail and no guarantees were given at any time. She has been n.p.o. since midnight. Informed consent has been obtained and located in the chart. Cefazolin 2 g was given as preoperative antibiotics in the preoperative holding area.     DESCRIPTION OF PROCEDURE: Under mild sedation the patient was brought to the operating room and placed on the operating table in supine position. Following induction of general anesthesia the left foot and ankle were prepped and draped in the usual aseptic fashion. Attention at this time was then directed to the medial aspect of the 1st metatarsophalangeal joint where an approximately 4 cm linear longitudinal incision was created. Blunt dissection was carried out  through subcutaneous layer and an inverted L capsulotomy was performed exposing the head of the 1st metatarsal. The noted dorsomedial eminence was then resected with small bone saw and a Reverdin green type osteotomy was performed through and through in the 1st metatarsal metaphysis. The capital fragment was then translated laterally and impacted slightly. Attention was then drawn to the 2nd digit where approximately 3 cm curvilinear incision was made extending from the metatarsophalangeal joint up over the proximal interphalangeal joint. A transverse tenotomy was performed at the level of the proximal interphalangeal joint exposing the head of the proximal phalanx which was resected with a small bone saw. The cartilage from the middle phalanx was also resected and the instrumentation from the [employer] Smart Toe set was utilized and prepared at both sides of the arthrodesis site. A size 19, 10° angled implant was then implanted and intraoperative fluoroscopy confirmed appropriate placement. Next, dissection was carried to the metatarsophaleangeal joint level where a dorsal capsulotomy was performed exposing the head of the 2nd metatarsal. The decision was made to perform a Weil osteotomy to allow for better relocation of the 2nd digit at the 2nd metatarsophalangeal joint. This was performed in standard fashion and the 2nd metatarsal was shortened approximately 3 mm and temporarily fixated with a wire from the micro Asnis set. Intraoperative fluoroscopy was utilized to confirm appropriate reduction deformity and placement of temporary fixation. Permanent fixation of the Reverdin osteotomy was performed with a single 2.0 mm and 3.0 mm cannulated screw and the Weil osteotomy was fixated with a single 2.0 mm cannulated screw. All temporary fixation was then removed. Intraoperative fluoroscopy once again utilized and confirmed appropriate placement of hardware reduction deformity. At this time it was decided the patient  would benefit form an Akin osteotomy for improved alignment of her hallux interphalangeus. The medial 1st metatarsophalangeal joint incision was extended and sharp dissection was performed to raise a subperiosteal flap across the dorsal aspect of the hallux proximal phalanx. A small bone saw was then utilized to create a medially based wedge keeping the lateral cortex of the hallux intact and the osteotomy was closed and temporarily fixated with a smooth wire and permanent fixation of this osteotomy was performed with a single 3.0 mm cannulated screw. Final intraoperative fluoroscopic images were then taken. The incisions were flushed with copious amounts of sterile saline and closed in layered fashion. The patient was placed into a soft, sterile dressing and will be able to ambulate in a short CAM boot. She will follow up early next week for incision check.

## 2019-09-11 NOTE — ANESTHESIA POSTPROCEDURE EVALUATION
Patient: Jaleesa Nam    Procedure Summary     Date:  09/11/19 Room / Location:  [facility] OR 09 / [facility] OR    Anesthesia Start:  0710 Anesthesia Stop:  0907    Procedure:  Reverdin bunionectomy, second hammertoe correction,  weil osteotomy                     c-arm (Left Foot) Diagnosis:       Hallux valgus of left foot      Hammer toe of left foot      (Hallux valgus of left foot [M20.12])      (Hammer toe of left foot [M20.42])    Surgeon:  Williams Perry DPM Provider:  Ananda Khan MD    Anesthesia Type:  general ASA Status:  3          Anesthesia Type: general  Last vitals  BP   125/60 (09/11/19 0618)   Temp   97.5 °F (36.4 °C) (09/11/19 0618)   Pulse   56 (09/11/19 0618)   Resp   18 (09/11/19 0618)     SpO2   94 % (09/11/19 0618)     Post Anesthesia Care and Evaluation    Patient location during evaluation: PACU  Level of consciousness: obtunded/minimal responses  Pain management: adequate  Airway patency: patent  Anesthetic complications: No anesthetic complications  PONV Status: none  Cardiovascular status: acceptable and hemodynamically stable  Respiratory status: spontaneous ventilation and acceptable  Hydration status: acceptable    Comments: lma in place to be removed in pacu

## 2019-09-11 NOTE — ANESTHESIA PROCEDURE NOTES
Airway  Urgency: elective    Date/Time: 9/11/2019 7:17 AM  Airway not difficult    General Information and Staff    Patient location during procedure: OR  CRNA: Luis Enrique Coffey CRNA    Indications and Patient Condition  Indications for airway management: airway protection    Preoxygenated: yes  Mask difficulty assessment: 0 - not attempted    Final Airway Details  Final airway type: supraglottic airway      Successful airway: I-gel  Size 4    Number of attempts at approach: 1  Assessment: lips, teeth, and gum same as pre-op and atraumatic intubation

## 2019-09-11 NOTE — BRIEF OP NOTE
BUNIONECTOMY [facility]NOLD  Progress Note    Jaleesa Nam  9/11/2019    Pre-op Diagnosis:   Hallux valgus of left foot [M20.12]  Hammer toe of left foot [M20.42]       Post-Op Diagnosis Codes:     * Hallux valgus of left foot [M20.12]     * Hammer toe of left foot [M20.42]    Procedure/CPT® Codes:      Procedure(s):  Reverdin bunionectomy, second hammertoe correction,  weil osteotomy                     c-arm    Surgeon(s):  Williams Perry DPM    Anesthesia: General    Staff:   Circulator: Jose F Delacruz RN  Radiology Technologist: Milligan, Stephanie T  Scrub Person: Kaycee Barajas; Ella Hernandez  Assistant: Sherin El MA    Estimated Blood Loss: minimal    Urine Voided: * No values recorded between 9/11/2019  7:10 AM and 9/11/2019  9:05 AM *    Specimens:                None          Drains:      Findings: Consistent with diagnosis    Complications: None          This document has been electronically signed by Williams Perry DPM on September 11, 2019 9:10 AM     Williams Perry DPM     Date: 9/11/2019  Time: 9:09 AM

## 2019-09-17 ENCOUNTER — OFFICE VISIT (OUTPATIENT)
Dept: PODIATRY | Facility: CLINIC | Age: 74
End: 2019-09-17

## 2019-09-17 VITALS — HEIGHT: 67 IN | HEART RATE: 83 BPM | OXYGEN SATURATION: 97 % | WEIGHT: 166.01 LBS | BODY MASS INDEX: 26.06 KG/M2

## 2019-09-17 DIAGNOSIS — M20.42 HAMMER TOE OF LEFT FOOT: ICD-10-CM

## 2019-09-17 DIAGNOSIS — M20.12 HALLUX VALGUS OF LEFT FOOT: Primary | ICD-10-CM

## 2019-09-17 PROCEDURE — 99024 POSTOP FOLLOW-UP VISIT: CPT | Performed by: PODIATRIST

## 2019-09-17 NOTE — PROGRESS NOTES
Jaleesa Nam  1945  74 y.o. female     Patient presents to clinic today for a post op follow up.     08/12/2019    Chief Complaint   Patient presents with   • Left Foot - Post-op Follow-up           History of Present Illness    Jaleesa Nam is a 74 y.o. female who presents for follow-up of left foot bunionectomy, Weil osteotomy and second hammertoe correction.  Date of surgery 9/11/2019.  She is doing well overall with expected postoperative pain.  She is ambulating her cam boot without significant issue.    Past Medical History:   Diagnosis Date   • Abrasion     Abrasion and/or friction burn      • Acute bronchitis    • Acute laryngitis     probably viral   • Acute maxillary sinusitis    • Acute pharyngitis    • Acute serous otitis media    • Acute sinusitis    • Acute urinary tract infection    • Ankle edema    • Aortic valve regurgitation    • Astigmatism    • Breast cancer (CMS/HCC)    • Bunion    • Chest pain    • Chest pain    • Contusion of foot    • Cough    • Diastolic dysfunction    • Dyspnea    • Encounter for general adult medical examination without abnormal findings    • Encounter for routine adult health examination    • Essential hypertension    • Gastroesophageal reflux disease    • Hammer toe    • Heart murmur    • Hip pain    • Hyperlipidemia    • Low back pain    • Malaise and fatigue    • Mammogram abnormal    • Menopause    • Myopia    • Need for immunization against influenza    • Need for prophylactic vaccination against Streptococcus pneumoniae (pneumococcus)    • Nuclear cataract    • Osteoarthritis    • Palpitations    • Plantar fasciitis    • Posterior subcapsular polar senile cataract    • Primary fibromyalgia syndrome    • Sciatica    • Screening for osteoporosis    • Screening mammogram, encounter for    • Shoulder pain    • Tear film insufficiency    • Trochanteric bursitis    • Upper respiratory infection    • Urinary tract infectious disease    • Vitamin D  deficiency          Past Surgical History:   Procedure Laterality Date   • BREAST BIOPSY  02/10/2011    Stereotactic breast biopsy (3)    Sterotactic location guide localization of the abnormality with wire placement. Left lumpectomy. Faxigram.    • BREAST LUMPECTOMY     • BUNIONECTOMY Left 9/11/2019    Procedure: Reverdin bunionectomy, second hammertoe correction,  weil osteotomy                     c-arm;  Surgeon: Williams Perry DPM;  Location: [facility];  Service: Podiatry   • CARDIAC CATHETERIZATION  10/01/2010    Cardiac cath 64854 (1)    Minimal plaquing in the first diagonal branch with evidence of good POORNIMA 3 flow. Preserved left ventricular systolic function with ejection fraction of 55%.    • CERVICAL SPINE SURGERY  06/09/1993    Cervical spine disk surgery (1)    C5-C6 anterior cervical discectomy and fusion with iliac bone graft.    • ENDOSCOPY  10/07/2015    Colon endoscopy 23215 (2)    negative cologard    • HIP ARTHROPLASTY  09/12/2011    Anesth, hip joint procedure (1)    Right total hip arthroplasty with adductor tenotomy. Osteoarthritis of the right hip.    • INJECTION OF MEDICATION  10/01/2014    Kenalog (4)      • INJECTION OF MEDICATION  09/03/2012    Rocephin (1)      • KNEE SURGERY  02/03/2016    Knee Surgery (1)    Left total knee arthroplasty.    • NECK SURGERY      Neck spinal fusion (1)      • NOSE SURGERY  11/18/1978    Treat nasal (nose) fracture (1) Nasal fracture reduction with splint fixation.    • OTHER SURGICAL HISTORY  06/22/2010    Correction of bunion (1)    Hallux valgus deformity of right foot. Base wedge bunionectomy of right foot.    • PAP SMEAR  12/10/2008   • SHOULDER SURGERY  06/17/2013    Shoulder arthroscopy/surgery (2)    Arthroscopy of the right shoulder with rotator cuff repair.    • TOTAL ABDOMINAL HYSTERECTOMY WITH SALPINGO OOPHORECTOMY     • TOTAL KNEE ARTHROPLASTY Left 02/03/2016         Family History   Problem Relation Age of Onset   • Coronary artery  "disease Other    • Hypertension Other    • Cancer Other         lung, stomach, bladder   • Arthritis Mother    • Hypertension Mother    • Heart disease Mother    • Hyperlipidemia Mother    • Cancer Maternal Grandmother    • Colon cancer Maternal Grandmother    • Cancer Maternal Grandfather          Social History     Socioeconomic History   • Marital status:      Spouse name: Not on file   • Number of children: Not on file   • Years of education: Not on file   • Highest education level: Not on file   Tobacco Use   • Smoking status: Never Smoker   • Smokeless tobacco: Never Used   Substance and Sexual Activity   • Alcohol use: No     Frequency: Never   • Drug use: No   • Sexual activity: Defer         Current Outpatient Medications   Medication Sig Dispense Refill   • amitriptyline (ELAVIL) 25 MG tablet Take 25 mg by mouth Every Night.     • amLODIPine (NORVASC) 5 MG tablet Take 5 mg by mouth Daily.     • calcium carbonate-vitamin d (CALTRATE 600+D) 600-400 MG-UNIT per tablet Take 1 tablet by mouth daily.     • HYDROcodone-acetaminophen (NORCO) 5-325 MG per tablet Take 1 tablet by mouth Every 6 (Six) Hours As Needed for Moderate Pain (4-6). 120 tablet 0   • lisinopril (PRINIVIL,ZESTRIL) 20 MG tablet Take 20 mg by mouth Daily.     • oxyCODONE-acetaminophen (PERCOCET) 7.5-325 MG per tablet Take 1 tablet by mouth Every 6 (Six) Hours As Needed for Severe Pain . 30 tablet 0   • tiZANidine (ZANAFLEX) 4 MG tablet Take 1 tablet by mouth Every 8 (Eight) Hours As Needed for Muscle Spasms. 30 tablet 1     No current facility-administered medications for this visit.          OBJECTIVE    Pulse 83   Ht 169.5 cm (66.75\")   Wt 75.3 kg (166 lb 0.1 oz)   SpO2 97%   BMI 26.20 kg/m²       Review of Systems   Constitutional: Negative.    HENT: Positive for hearing loss.    Eyes: Negative.    Respiratory: Negative.    Cardiovascular: Positive for leg swelling.   Gastrointestinal: Negative.    Endocrine: Negative.  "   Genitourinary: Negative.    Musculoskeletal: Positive for arthralgias, back pain and joint swelling.        Foot pain, joint pain   Skin: Negative.    Allergic/Immunologic: Negative.    Neurological: Positive for numbness.   Hematological: Negative.    Psychiatric/Behavioral: Negative.          Physical Exam   Constitutional: she appears well-developed and well-nourished.   HEENT: Normocephalic. Atraumatic  CV: No CP. RRR  Resp: Non-labored respirations  Psychiatric: she has a normal mood and affect. her behavior is normal.         Lower Extremity Exam:  Pulses palpable.  Cap refill time normal.  Negative Charline  Mild left foot edema  Sutures in place with no surgical site dehiscence.  No signs of infection  Left hallux, second toe in improved alignment            ASSESSMENT AND PLAN    Jaleesa was seen today for post-op follow-up.    Diagnoses and all orders for this visit:    Hallux valgus of left foot    Hammer toe of left foot      -Doing well postoperatively  -Dressing change today to be left clean dry intact x1 week  -Continue to utilize cam boot for all ambulation  -Recheck 1 week, suture removal            This document has been electronically signed by Williams Perry DPM on September 18, 2019 5:50 PM     EMR Dragon/Transcription disclaimer:   Much of this encounter note is an electronic transcription/translation of spoken language to printed text. The electronic translation of spoken language may permit erroneous, or at times, nonsensical words or phrases to be inadvertently transcribed; Although I have reviewed the note for such errors, some may still exist.    Williams Perry DPM  9/18/2019  5:50 PM

## 2019-09-19 ENCOUNTER — TELEPHONE (OUTPATIENT)
Dept: FAMILY MEDICINE CLINIC | Facility: CLINIC | Age: 74
End: 2019-09-19

## 2019-09-19 NOTE — TELEPHONE ENCOUNTER
Per Dr. Wright, Ms. Nam has been called with recent DEXA bone Density Study results & recommendations.  Continue current medications and follow-up as planned or sooner if any problems.      ----- Message from Emi Wright MD sent at 9/13/2019  5:11 PM CDT -----  Call and tell bone density shows some bone loss but not osteoporosis, make sure she is taking Ca + D 600mg daily and exercising regularly

## 2019-09-24 ENCOUNTER — OFFICE VISIT (OUTPATIENT)
Dept: PODIATRY | Facility: CLINIC | Age: 74
End: 2019-09-24

## 2019-09-24 VITALS — OXYGEN SATURATION: 99 % | HEIGHT: 66 IN | HEART RATE: 68 BPM | WEIGHT: 166 LBS | BODY MASS INDEX: 26.68 KG/M2

## 2019-09-24 DIAGNOSIS — M20.42 HAMMER TOE OF LEFT FOOT: ICD-10-CM

## 2019-09-24 DIAGNOSIS — M20.12 HALLUX VALGUS OF LEFT FOOT: Primary | ICD-10-CM

## 2019-09-24 PROCEDURE — 99024 POSTOP FOLLOW-UP VISIT: CPT | Performed by: PODIATRIST

## 2019-09-24 NOTE — PROGRESS NOTES
Jaleesa Nam  1945  74 y.o. female     Patient presents to clinic today for a post op follow up.     09/24/2019      Chief Complaint   Patient presents with   • Left Foot - Follow-up           History of Present Illness    Jaleesa Nam is a 74 y.o. female who presents for follow-up of left foot bunionectomy, Weil osteotomy and second hammertoe correction.  Date of surgery 9/11/2019.  She is doing well overall with improved pain.    Past Medical History:   Diagnosis Date   • Abrasion     Abrasion and/or friction burn      • Acute bronchitis    • Acute laryngitis     probably viral   • Acute maxillary sinusitis    • Acute pharyngitis    • Acute serous otitis media    • Acute sinusitis    • Acute urinary tract infection    • Ankle edema    • Aortic valve regurgitation    • Astigmatism    • Breast cancer (CMS/HCC)    • Bunion    • Chest pain    • Chest pain    • Contusion of foot    • Cough    • Diastolic dysfunction    • Dyspnea    • Encounter for general adult medical examination without abnormal findings    • Encounter for routine adult health examination    • Essential hypertension    • Gastroesophageal reflux disease    • Hammer toe    • Heart murmur    • Hip pain    • Hyperlipidemia    • Low back pain    • Malaise and fatigue    • Mammogram abnormal    • Menopause    • Myopia    • Need for immunization against influenza    • Need for prophylactic vaccination against Streptococcus pneumoniae (pneumococcus)    • Nuclear cataract    • Osteoarthritis    • Palpitations    • Plantar fasciitis    • Posterior subcapsular polar senile cataract    • Primary fibromyalgia syndrome    • Sciatica    • Screening for osteoporosis    • Screening mammogram, encounter for    • Shoulder pain    • Tear film insufficiency    • Trochanteric bursitis    • Upper respiratory infection    • Urinary tract infectious disease    • Vitamin D deficiency          Past Surgical History:   Procedure Laterality Date   • BREAST  BIOPSY  02/10/2011    Stereotactic breast biopsy (3)    Sterotactic location guide localization of the abnormality with wire placement. Left lumpectomy. Faxigram.    • BREAST LUMPECTOMY     • BUNIONECTOMY Left 9/11/2019    Procedure: Reverdin bunionectomy, second hammertoe correction,  weil osteotomy                     c-arm;  Surgeon: Williams Perry DPM;  Location: [facility];  Service: Podiatry   • CARDIAC CATHETERIZATION  10/01/2010    Cardiac cath 59262 (1)    Minimal plaquing in the first diagonal branch with evidence of good POORNIMA 3 flow. Preserved left ventricular systolic function with ejection fraction of 55%.    • CERVICAL SPINE SURGERY  06/09/1993    Cervical spine disk surgery (1)    C5-C6 anterior cervical discectomy and fusion with iliac bone graft.    • ENDOSCOPY  10/07/2015    Colon endoscopy 95038 (2)    negative cologard    • HIP ARTHROPLASTY  09/12/2011    Anesth, hip joint procedure (1)    Right total hip arthroplasty with adductor tenotomy. Osteoarthritis of the right hip.    • INJECTION OF MEDICATION  10/01/2014    Kenalog (4)      • INJECTION OF MEDICATION  09/03/2012    Rocephin (1)      • KNEE SURGERY  02/03/2016    Knee Surgery (1)    Left total knee arthroplasty.    • NECK SURGERY      Neck spinal fusion (1)      • NOSE SURGERY  11/18/1978    Treat nasal (nose) fracture (1) Nasal fracture reduction with splint fixation.    • OTHER SURGICAL HISTORY  06/22/2010    Correction of bunion (1)    Hallux valgus deformity of right foot. Base wedge bunionectomy of right foot.    • PAP SMEAR  12/10/2008   • SHOULDER SURGERY  06/17/2013    Shoulder arthroscopy/surgery (2)    Arthroscopy of the right shoulder with rotator cuff repair.    • TOTAL ABDOMINAL HYSTERECTOMY WITH SALPINGO OOPHORECTOMY     • TOTAL KNEE ARTHROPLASTY Left 02/03/2016         Family History   Problem Relation Age of Onset   • Coronary artery disease Other    • Hypertension Other    • Cancer Other         lung, stomach, bladder  "  • Arthritis Mother    • Hypertension Mother    • Heart disease Mother    • Hyperlipidemia Mother    • Cancer Maternal Grandmother    • Colon cancer Maternal Grandmother    • Cancer Maternal Grandfather          Social History     Socioeconomic History   • Marital status:      Spouse name: Not on file   • Number of children: Not on file   • Years of education: Not on file   • Highest education level: Not on file   Tobacco Use   • Smoking status: Never Smoker   • Smokeless tobacco: Never Used   Substance and Sexual Activity   • Alcohol use: No     Frequency: Never   • Drug use: No   • Sexual activity: Defer         Current Outpatient Medications   Medication Sig Dispense Refill   • amitriptyline (ELAVIL) 25 MG tablet Take 25 mg by mouth Every Night.     • amLODIPine (NORVASC) 5 MG tablet Take 5 mg by mouth Daily.     • calcium carbonate-vitamin d (CALTRATE 600+D) 600-400 MG-UNIT per tablet Take 1 tablet by mouth daily.     • HYDROcodone-acetaminophen (NORCO) 5-325 MG per tablet Take 1 tablet by mouth Every 6 (Six) Hours As Needed for Moderate Pain (4-6). 120 tablet 0   • lisinopril (PRINIVIL,ZESTRIL) 20 MG tablet Take 20 mg by mouth Daily.     • oxyCODONE-acetaminophen (PERCOCET) 7.5-325 MG per tablet Take 1 tablet by mouth Every 6 (Six) Hours As Needed for Severe Pain . 30 tablet 0   • tiZANidine (ZANAFLEX) 4 MG tablet Take 1 tablet by mouth Every 8 (Eight) Hours As Needed for Muscle Spasms. 30 tablet 1     No current facility-administered medications for this visit.          OBJECTIVE    Pulse 68   Ht 167.6 cm (66\")   Wt 75.3 kg (166 lb)   SpO2 99%   BMI 26.79 kg/m²       Review of Systems   Constitutional: Negative.    HENT: Positive for hearing loss.    Eyes: Negative.    Respiratory: Negative.    Cardiovascular: Positive for leg swelling.   Gastrointestinal: Negative.    Endocrine: Negative.    Genitourinary: Negative.    Musculoskeletal: Positive for arthralgias, back pain and joint swelling.        " Foot pain, joint pain   Skin: Negative.    Allergic/Immunologic: Negative.    Neurological: Positive for numbness.   Hematological: Negative.    Psychiatric/Behavioral: Negative.          Physical Exam   Constitutional: she appears well-developed and well-nourished.   HEENT: Normocephalic. Atraumatic  CV: No CP. RRR  Resp: Non-labored respirations  Psychiatric: she has a normal mood and affect. her behavior is normal.         Lower Extremity Exam:  Pulses palpable.  Cap refill time normal.  Negative Charline  Mild left foot edema  Sutures in place with no surgical site dehiscence.  No signs of infection  Left hallux, second toe in improved alignment            ASSESSMENT AND PLAN    Jaleesa was seen today for follow-up.    Diagnoses and all orders for this visit:    Hallux valgus of left foot  -     XR Foot 3+ View Left    Hammer toe of left foot  -     XR Foot 3+ View Left      -Doing well postoperatively  -Sutures removed.  May begin to get incision sites wet in 24 hours  -Radiographs ordered and reviewed  -Will transition to stiff soled surgical shoe for patient comfort.  Stressed need to wear this for all ambulation  -Recheck 3 weeks            This document has been electronically signed by Williams Perry DPM on September 26, 2019 4:05 PM     EMR Dragon/Transcription disclaimer:   Much of this encounter note is an electronic transcription/translation of spoken language to printed text. The electronic translation of spoken language may permit erroneous, or at times, nonsensical words or phrases to be inadvertently transcribed; Although I have reviewed the note for such errors, some may still exist.    Williams Perry DPM  9/26/2019  4:05 PM

## 2019-10-15 ENCOUNTER — OFFICE VISIT (OUTPATIENT)
Dept: PODIATRY | Facility: CLINIC | Age: 74
End: 2019-10-15

## 2019-10-15 VITALS — HEART RATE: 92 BPM | WEIGHT: 166 LBS | BODY MASS INDEX: 26.68 KG/M2 | OXYGEN SATURATION: 98 % | HEIGHT: 66 IN

## 2019-10-15 DIAGNOSIS — M20.42 HAMMER TOE OF LEFT FOOT: ICD-10-CM

## 2019-10-15 DIAGNOSIS — M20.12 HALLUX VALGUS OF LEFT FOOT: Primary | ICD-10-CM

## 2019-10-15 PROCEDURE — 99024 POSTOP FOLLOW-UP VISIT: CPT | Performed by: PODIATRIST

## 2019-10-15 NOTE — PROGRESS NOTES
Jaleesa Nam  1945  74 y.o. female     Patient presents to clinic today for a post op follow up.     10/15/2019      Chief Complaint   Patient presents with   • Left Foot - Post-op           History of Present Illness    Jaleesa Nam is a 74 y.o. female who presents for follow-up of left foot bunionectomy, Weil osteotomy and second hammertoe correction.  Date of surgery 9/11/2019.  She is doing well overall with no pain today.  Ambulating in surgical shoe without issue.    Past Medical History:   Diagnosis Date   • Abrasion     Abrasion and/or friction burn      • Acute bronchitis    • Acute laryngitis     probably viral   • Acute maxillary sinusitis    • Acute pharyngitis    • Acute serous otitis media    • Acute sinusitis    • Acute urinary tract infection    • Ankle edema    • Aortic valve regurgitation    • Astigmatism    • Breast cancer (CMS/HCC)    • Bunion    • Chest pain    • Chest pain    • Contusion of foot    • Cough    • Diastolic dysfunction    • Dyspnea    • Encounter for general adult medical examination without abnormal findings    • Encounter for routine adult health examination    • Essential hypertension    • Gastroesophageal reflux disease    • Hammer toe    • Heart murmur    • Hip pain    • Hyperlipidemia    • Low back pain    • Malaise and fatigue    • Mammogram abnormal    • Menopause    • Myopia    • Need for immunization against influenza    • Need for prophylactic vaccination against Streptococcus pneumoniae (pneumococcus)    • Nuclear cataract    • Osteoarthritis    • Palpitations    • Plantar fasciitis    • Posterior subcapsular polar senile cataract    • Primary fibromyalgia syndrome    • Sciatica    • Screening for osteoporosis    • Screening mammogram, encounter for    • Shoulder pain    • Tear film insufficiency    • Trochanteric bursitis    • Upper respiratory infection    • Urinary tract infectious disease    • Vitamin D deficiency          Past Surgical  History:   Procedure Laterality Date   • BREAST BIOPSY  02/10/2011    Stereotactic breast biopsy (3)    Sterotactic location guide localization of the abnormality with wire placement. Left lumpectomy. Faxigram.    • BREAST LUMPECTOMY     • BUNIONECTOMY Left 9/11/2019    Procedure: Reverdin bunionectomy, second hammertoe correction,  weil osteotomy                     c-arm;  Surgeon: Williams Perry DPM;  Location: [facility];  Service: Podiatry   • CARDIAC CATHETERIZATION  10/01/2010    Cardiac cath 36596 (1)    Minimal plaquing in the first diagonal branch with evidence of good POORNIMA 3 flow. Preserved left ventricular systolic function with ejection fraction of 55%.    • CERVICAL SPINE SURGERY  06/09/1993    Cervical spine disk surgery (1)    C5-C6 anterior cervical discectomy and fusion with iliac bone graft.    • ENDOSCOPY  10/07/2015    Colon endoscopy 17422 (2)    negative cologard    • HIP ARTHROPLASTY  09/12/2011    Anesth, hip joint procedure (1)    Right total hip arthroplasty with adductor tenotomy. Osteoarthritis of the right hip.    • INJECTION OF MEDICATION  10/01/2014    Kenalog (4)      • INJECTION OF MEDICATION  09/03/2012    Rocephin (1)      • KNEE SURGERY  02/03/2016    Knee Surgery (1)    Left total knee arthroplasty.    • NECK SURGERY      Neck spinal fusion (1)      • NOSE SURGERY  11/18/1978    Treat nasal (nose) fracture (1) Nasal fracture reduction with splint fixation.    • OTHER SURGICAL HISTORY  06/22/2010    Correction of bunion (1)    Hallux valgus deformity of right foot. Base wedge bunionectomy of right foot.    • PAP SMEAR  12/10/2008   • SHOULDER SURGERY  06/17/2013    Shoulder arthroscopy/surgery (2)    Arthroscopy of the right shoulder with rotator cuff repair.    • TOTAL ABDOMINAL HYSTERECTOMY WITH SALPINGO OOPHORECTOMY     • TOTAL KNEE ARTHROPLASTY Left 02/03/2016         Family History   Problem Relation Age of Onset   • Coronary artery disease Other    • Hypertension Other   "  • Cancer Other         lung, stomach, bladder   • Arthritis Mother    • Hypertension Mother    • Heart disease Mother    • Hyperlipidemia Mother    • Cancer Maternal Grandmother    • Colon cancer Maternal Grandmother    • Cancer Maternal Grandfather          Social History     Socioeconomic History   • Marital status:      Spouse name: Not on file   • Number of children: Not on file   • Years of education: Not on file   • Highest education level: Not on file   Tobacco Use   • Smoking status: Never Smoker   • Smokeless tobacco: Never Used   Substance and Sexual Activity   • Alcohol use: No     Frequency: Never   • Drug use: No   • Sexual activity: Defer         Current Outpatient Medications   Medication Sig Dispense Refill   • amitriptyline (ELAVIL) 25 MG tablet Take 25 mg by mouth Every Night.     • amLODIPine (NORVASC) 5 MG tablet Take 5 mg by mouth Daily.     • calcium carbonate-vitamin d (CALTRATE 600+D) 600-400 MG-UNIT per tablet Take 1 tablet by mouth daily.     • lisinopril (PRINIVIL,ZESTRIL) 20 MG tablet Take 20 mg by mouth Daily.     • oxyCODONE-acetaminophen (PERCOCET) 7.5-325 MG per tablet Take 1 tablet by mouth Every 6 (Six) Hours As Needed for Severe Pain . 30 tablet 0   • tiZANidine (ZANAFLEX) 4 MG tablet Take 1 tablet by mouth Every 8 (Eight) Hours As Needed for Muscle Spasms. 30 tablet 1     No current facility-administered medications for this visit.          OBJECTIVE    Pulse 92   Ht 167.6 cm (66\")   Wt 75.3 kg (166 lb)   SpO2 98%   BMI 26.79 kg/m²       Review of Systems   Constitutional: Negative.    HENT: Positive for hearing loss.    Eyes: Negative.    Respiratory: Negative.    Cardiovascular: Positive for leg swelling.   Gastrointestinal: Negative.    Endocrine: Negative.    Genitourinary: Negative.    Musculoskeletal: Positive for arthralgias, back pain and joint swelling.        Foot pain, joint pain   Skin: Negative.    Allergic/Immunologic: Negative.    Neurological: Positive " for numbness.   Hematological: Negative.    Psychiatric/Behavioral: Negative.          Physical Exam   Constitutional: she appears well-developed and well-nourished.   HEENT: Normocephalic. Atraumatic  CV: No CP. RRR  Resp: Non-labored respirations  Psychiatric: she has a normal mood and affect. her behavior is normal.         Lower Extremity Exam:  Pulses palpable.  Cap refill time normal.  Negative Charline  Mild left foot edema, most notable around the base of second toe.  Left foot incisions well-healed without signs of infection  Left hallux, second toe in improved alignment.  Range of motion intact without pain or crepitus.            ASSESSMENT AND PLAN    Jaelesa was seen today for post-op.    Diagnoses and all orders for this visit:    Hallux valgus of left foot  -     XR Foot 3+ View Left    Hammer toe of left foot  -     XR Foot 3+ View Left      -Doing well postoperatively  -Radiographs ordered and reviewed  -Advised over the next week she may begin transition to stable soled athletic shoe gear or open toed shoe.  Increase activity as tolerated.  -Toe sleeves dispensed for edema control of left second digit  -Recheck 4 weeks            This document has been electronically signed by Williams Perry DPM on October 17, 2019 7:31 AM     EMR Dragon/Transcription disclaimer:   Much of this encounter note is an electronic transcription/translation of spoken language to printed text. The electronic translation of spoken language may permit erroneous, or at times, nonsensical words or phrases to be inadvertently transcribed; Although I have reviewed the note for such errors, some may still exist.    Williams Perry DPM  10/17/2019  7:31 AM

## 2019-10-21 DIAGNOSIS — M50.90 CERVICAL DISC DISEASE: Primary | ICD-10-CM

## 2019-10-21 DIAGNOSIS — M54.2 NECK PAIN: ICD-10-CM

## 2019-10-21 NOTE — PROGRESS NOTES
Patient contact the office today and requested to be sent to PT for her neck pain and discomfort.   The OV in September Dr. Wright made notation that she felt she would benefit from PT.  Patient was having foot surgery and wanted to wait until she could get around better.      Referral sent today.

## 2019-11-07 DIAGNOSIS — M51.36 DEGENERATIVE DISC DISEASE, LUMBAR: Chronic | ICD-10-CM

## 2019-11-07 DIAGNOSIS — M50.90 CERVICAL DISC DISEASE: Chronic | ICD-10-CM

## 2019-11-07 DIAGNOSIS — R39.9 UTI SYMPTOMS: Primary | ICD-10-CM

## 2019-11-07 DIAGNOSIS — M15.9 DEGENERATIVE JOINT DISEASE INVOLVING MULTIPLE JOINTS ON BOTH SIDES OF BODY: Chronic | ICD-10-CM

## 2019-11-07 RX ORDER — HYDROCODONE BITARTRATE AND ACETAMINOPHEN 5; 325 MG/1; MG/1
1 TABLET ORAL EVERY 6 HOURS PRN
Qty: 120 TABLET | Refills: 0 | Status: SHIPPED | OUTPATIENT
Start: 2019-11-07 | End: 2019-11-07 | Stop reason: SDUPTHER

## 2019-11-07 RX ORDER — HYDROCODONE BITARTRATE AND ACETAMINOPHEN 5; 325 MG/1; MG/1
1 TABLET ORAL EVERY 6 HOURS PRN
Qty: 120 TABLET | Refills: 0 | Status: SHIPPED | OUTPATIENT
Start: 2019-11-07 | End: 2019-12-19 | Stop reason: SDUPTHER

## 2019-11-12 ENCOUNTER — LAB (OUTPATIENT)
Dept: LAB | Facility: HOSPITAL | Age: 74
End: 2019-11-12

## 2019-11-12 ENCOUNTER — OFFICE VISIT (OUTPATIENT)
Dept: PODIATRY | Facility: CLINIC | Age: 74
End: 2019-11-12

## 2019-11-12 VITALS — WEIGHT: 166 LBS | HEART RATE: 68 BPM | OXYGEN SATURATION: 98 % | HEIGHT: 66 IN | BODY MASS INDEX: 26.68 KG/M2

## 2019-11-12 DIAGNOSIS — M20.42 HAMMER TOE OF LEFT FOOT: ICD-10-CM

## 2019-11-12 DIAGNOSIS — M20.12 HALLUX VALGUS OF LEFT FOOT: Primary | ICD-10-CM

## 2019-11-12 DIAGNOSIS — R39.9 UTI SYMPTOMS: ICD-10-CM

## 2019-11-12 PROCEDURE — 87186 SC STD MICRODIL/AGAR DIL: CPT

## 2019-11-12 PROCEDURE — 87077 CULTURE AEROBIC IDENTIFY: CPT

## 2019-11-12 PROCEDURE — 81001 URINALYSIS AUTO W/SCOPE: CPT

## 2019-11-12 PROCEDURE — 87086 URINE CULTURE/COLONY COUNT: CPT

## 2019-11-12 PROCEDURE — 99024 POSTOP FOLLOW-UP VISIT: CPT | Performed by: PODIATRIST

## 2019-11-12 NOTE — PROGRESS NOTES
Jaleesa Nam  1945  74 y.o. female     Patient presents to clinic today for a post op follow up.     11/12/2019        Chief Complaint   Patient presents with   • Left Foot - Post-op Follow-up           History of Present Illness    Jaleesa Nam is a 74 y.o. female who presents for follow-up of left foot bunionectomy, Weil osteotomy and second hammertoe correction.  Date of surgery 9/11/2019.  She is doing well overall with no pain today.  Ambulating in regular shoes without issue.    Past Medical History:   Diagnosis Date   • Abrasion     Abrasion and/or friction burn      • Acute bronchitis    • Acute laryngitis     probably viral   • Acute maxillary sinusitis    • Acute pharyngitis    • Acute serous otitis media    • Acute sinusitis    • Acute urinary tract infection    • Ankle edema    • Aortic valve regurgitation    • Astigmatism    • Breast cancer (CMS/HCC)    • Bunion    • Chest pain    • Chest pain    • Contusion of foot    • Cough    • Diastolic dysfunction    • Dyspnea    • Encounter for general adult medical examination without abnormal findings    • Encounter for routine adult health examination    • Essential hypertension    • Gastroesophageal reflux disease    • Hammer toe    • Heart murmur    • Hip pain    • Hyperlipidemia    • Low back pain    • Malaise and fatigue    • Mammogram abnormal    • Menopause    • Myopia    • Need for immunization against influenza    • Need for prophylactic vaccination against Streptococcus pneumoniae (pneumococcus)    • Nuclear cataract    • Osteoarthritis    • Palpitations    • Plantar fasciitis    • Posterior subcapsular polar senile cataract    • Primary fibromyalgia syndrome    • Sciatica    • Screening for osteoporosis    • Screening mammogram, encounter for    • Shoulder pain    • Tear film insufficiency    • Trochanteric bursitis    • Upper respiratory infection    • Urinary tract infectious disease    • Vitamin D deficiency          Past  Surgical History:   Procedure Laterality Date   • BREAST BIOPSY  02/10/2011    Stereotactic breast biopsy (3)    Sterotactic location guide localization of the abnormality with wire placement. Left lumpectomy. Faxigram.    • BREAST LUMPECTOMY     • BUNIONECTOMY Left 9/11/2019    Procedure: Reverdin bunionectomy, second hammertoe correction,  weil osteotomy                     c-arm;  Surgeon: Williams Perry DPM;  Location: [facility];  Service: Podiatry   • CARDIAC CATHETERIZATION  10/01/2010    Cardiac cath [id number] (1)    Minimal plaquing in the first diagonal branch with evidence of good POORNIMA 3 flow. Preserved left ventricular systolic function with ejection fraction of 55%.    • CERVICAL SPINE SURGERY  06/09/1993    Cervical spine disk surgery (1)    C5-C6 anterior cervical discectomy and fusion with iliac bone graft.    • ENDOSCOPY  10/07/2015    Colon endoscopy [id number] (2)    negative cologard    • HIP ARTHROPLASTY  09/12/2011    Anesth, hip joint procedure (1)    Right total hip arthroplasty with adductor tenotomy. Osteoarthritis of the right hip.    • INJECTION OF MEDICATION  10/01/2014    Kenalog (4)      • INJECTION OF MEDICATION  09/03/2012    Rocephin (1)      • KNEE SURGERY  02/03/2016    Knee Surgery (1)    Left total knee arthroplasty.    • NECK SURGERY      Neck spinal fusion (1)      • NOSE SURGERY  11/18/1978    Treat nasal (nose) fracture (1) Nasal fracture reduction with splint fixation.    • OTHER SURGICAL HISTORY  06/22/2010    Correction of bunion (1)    Hallux valgus deformity of right foot. Base wedge bunionectomy of right foot.    • PAP SMEAR  12/10/2008   • SHOULDER SURGERY  06/17/2013    Shoulder arthroscopy/surgery (2)    Arthroscopy of the right shoulder with rotator cuff repair.    • TOTAL ABDOMINAL HYSTERECTOMY WITH SALPINGO OOPHORECTOMY     • TOTAL KNEE ARTHROPLASTY Left 02/03/2016         Family History   Problem Relation Age of Onset   • Coronary artery disease Other    •  "Hypertension Other    • Cancer Other         lung, stomach, bladder   • Arthritis Mother    • Hypertension Mother    • Heart disease Mother    • Hyperlipidemia Mother    • Cancer Maternal Grandmother    • Colon cancer Maternal Grandmother    • Cancer Maternal Grandfather          Social History     Socioeconomic History   • Marital status:      Spouse name: Not on file   • Number of children: Not on file   • Years of education: Not on file   • Highest education level: Not on file   Tobacco Use   • Smoking status: Never Smoker   • Smokeless tobacco: Never Used   Substance and Sexual Activity   • Alcohol use: No     Frequency: Never   • Drug use: No   • Sexual activity: Defer         Current Outpatient Medications   Medication Sig Dispense Refill   • amitriptyline (ELAVIL) 25 MG tablet Take 25 mg by mouth Every Night.     • amLODIPine (NORVASC) 5 MG tablet Take 5 mg by mouth Daily.     • calcium carbonate-vitamin d (CALTRATE 600+D) 600-400 MG-UNIT per tablet Take 1 tablet by mouth daily.     • HYDROcodone-acetaminophen (NORCO) 5-325 MG per tablet Take 1 tablet by mouth Every 6 (Six) Hours As Needed for Moderate Pain (4-6). 120 tablet 0   • lisinopril (PRINIVIL,ZESTRIL) 20 MG tablet Take 20 mg by mouth Daily.     • tiZANidine (ZANAFLEX) 4 MG tablet Take 1 tablet by mouth Every 8 (Eight) Hours As Needed for Muscle Spasms. 30 tablet 1     No current facility-administered medications for this visit.          OBJECTIVE    Pulse 68   Ht 167.6 cm (66\")   Wt 75.3 kg (166 lb)   SpO2 98%   BMI 26.79 kg/m²       Review of Systems   Constitutional: Negative.    HENT: Positive for hearing loss.    Eyes: Negative.    Respiratory: Negative.    Cardiovascular: Positive for leg swelling.   Gastrointestinal: Negative.    Endocrine: Negative.    Genitourinary: Negative.    Musculoskeletal: Positive for arthralgias, back pain and joint swelling.        Foot pain, joint pain   Skin: Negative.    Allergic/Immunologic: Negative.  "   Neurological: Positive for numbness.   Hematological: Negative.    Psychiatric/Behavioral: Negative.          Physical Exam   Constitutional: she appears well-developed and well-nourished.   HEENT: Normocephalic. Atraumatic  CV: No CP. RRR  Resp: Non-labored respirations  Psychiatric: she has a normal mood and affect. her behavior is normal.         Lower Extremity Exam:  Pulses palpable.  Cap refill time normal.  Negative Charline  Minimal left foot edema, most notable around the base of second toe.  Left foot incisions well-healed without signs of infection  Left hallux, second toe in improved alignment.  Range of motion intact without pain or crepitus.            ASSESSMENT AND PLAN    Jaleesa was seen today for post-op follow-up.    Diagnoses and all orders for this visit:    Hallux valgus of left foot  -     XR Foot 3+ View Left    Hammer toe of left foot  -     XR Foot 3+ View Left      -Doing well postoperatively  -Radiographs ordered and reviewed  -Continue regular shoe gear, increase activity as tolerated  -Recheck as needed            This document has been electronically signed by Sherin El MA on November 12, 2019 2:09 PM     EMR Dragon/Transcription disclaimer:   Much of this encounter note is an electronic transcription/translation of spoken language to printed text. The electronic translation of spoken language may permit erroneous, or at times, nonsensical words or phrases to be inadvertently transcribed; Although I have reviewed the note for such errors, some may still exist.    Sherin El MA  11/12/2019  2:09 PM

## 2019-11-13 LAB
BACTERIA UR QL AUTO: ABNORMAL /HPF
BILIRUB UR QL STRIP: NEGATIVE
CLARITY UR: ABNORMAL
COD CRY URNS QL: ABNORMAL /HPF
COLOR UR: YELLOW
GLUCOSE UR STRIP-MCNC: NEGATIVE MG/DL
HGB UR QL STRIP.AUTO: NEGATIVE
HYALINE CASTS UR QL AUTO: ABNORMAL /LPF
KETONES UR QL STRIP: ABNORMAL
LEUKOCYTE ESTERASE UR QL STRIP.AUTO: ABNORMAL
NITRITE UR QL STRIP: NEGATIVE
PH UR STRIP.AUTO: 5.5 [PH] (ref 5–8)
PROT UR QL STRIP: NEGATIVE
RBC # UR: ABNORMAL /HPF
REF LAB TEST METHOD: ABNORMAL
SP GR UR STRIP: 1.03 (ref 1–1.03)
SQUAMOUS #/AREA URNS HPF: ABNORMAL /HPF
UROBILINOGEN UR QL STRIP: ABNORMAL
WBC UR QL AUTO: ABNORMAL /HPF

## 2019-11-14 LAB — BACTERIA SPEC AEROBE CULT: ABNORMAL

## 2019-11-14 RX ORDER — NITROFURANTOIN 25; 75 MG/1; MG/1
100 CAPSULE ORAL 2 TIMES DAILY
Qty: 6 CAPSULE | Refills: 0 | Status: SHIPPED | OUTPATIENT
Start: 2019-11-14 | End: 2019-11-17

## 2019-12-19 ENCOUNTER — OFFICE VISIT (OUTPATIENT)
Dept: FAMILY MEDICINE CLINIC | Facility: CLINIC | Age: 74
End: 2019-12-19

## 2019-12-19 VITALS
HEART RATE: 68 BPM | OXYGEN SATURATION: 100 % | SYSTOLIC BLOOD PRESSURE: 150 MMHG | HEIGHT: 66 IN | BODY MASS INDEX: 26.68 KG/M2 | DIASTOLIC BLOOD PRESSURE: 72 MMHG | WEIGHT: 166 LBS

## 2019-12-19 DIAGNOSIS — M50.90 CERVICAL DISC DISEASE: Chronic | ICD-10-CM

## 2019-12-19 DIAGNOSIS — M15.9 DEGENERATIVE JOINT DISEASE INVOLVING MULTIPLE JOINTS ON BOTH SIDES OF BODY: Chronic | ICD-10-CM

## 2019-12-19 DIAGNOSIS — I10 ESSENTIAL HYPERTENSION: Primary | Chronic | ICD-10-CM

## 2019-12-19 DIAGNOSIS — M51.36 DEGENERATIVE DISC DISEASE, LUMBAR: Chronic | ICD-10-CM

## 2019-12-19 DIAGNOSIS — Z23 NEED FOR IMMUNIZATION AGAINST INFLUENZA: ICD-10-CM

## 2019-12-19 PROCEDURE — 90653 IIV ADJUVANT VACCINE IM: CPT | Performed by: GENERAL PRACTICE

## 2019-12-19 PROCEDURE — 99214 OFFICE O/P EST MOD 30 MIN: CPT | Performed by: GENERAL PRACTICE

## 2019-12-19 PROCEDURE — G0008 ADMIN INFLUENZA VIRUS VAC: HCPCS | Performed by: GENERAL PRACTICE

## 2019-12-19 RX ORDER — HYDROCODONE BITARTRATE AND ACETAMINOPHEN 5; 325 MG/1; MG/1
1 TABLET ORAL EVERY 6 HOURS PRN
Qty: 120 TABLET | Refills: 0 | Status: SHIPPED | OUTPATIENT
Start: 2019-12-19 | End: 2020-02-20 | Stop reason: SDUPTHER

## 2019-12-19 RX ORDER — AMLODIPINE BESYLATE 10 MG/1
10 TABLET ORAL DAILY
Qty: 90 TABLET | Refills: 3 | Status: SHIPPED | OUTPATIENT
Start: 2019-12-19 | End: 2020-12-16 | Stop reason: SDUPTHER

## 2020-01-13 ENCOUNTER — TRANSCRIBE ORDERS (OUTPATIENT)
Dept: LAB | Facility: HOSPITAL | Age: 75
End: 2020-01-13

## 2020-01-13 ENCOUNTER — LAB (OUTPATIENT)
Dept: LAB | Facility: HOSPITAL | Age: 75
End: 2020-01-13

## 2020-01-13 DIAGNOSIS — M32.9 LUPUS (HCC): ICD-10-CM

## 2020-01-13 DIAGNOSIS — M32.9 LUPUS (HCC): Primary | ICD-10-CM

## 2020-01-13 PROCEDURE — 36415 COLL VENOUS BLD VENIPUNCTURE: CPT

## 2020-01-13 PROCEDURE — 86235 NUCLEAR ANTIGEN ANTIBODY: CPT

## 2020-01-13 PROCEDURE — 83516 IMMUNOASSAY NONANTIBODY: CPT

## 2020-01-13 PROCEDURE — 86225 DNA ANTIBODY NATIVE: CPT

## 2020-01-14 LAB
CENTROMERE B AB SER-ACNC: <0.2 AI (ref 0–0.9)
CHROMATIN AB SERPL-ACNC: <0.2 AI (ref 0–0.9)
DSDNA AB SER-ACNC: <1 IU/ML (ref 0–9)
ENA JO1 AB SER-ACNC: <0.2 AI (ref 0–0.9)
ENA RNP AB SER-ACNC: 0.3 AI (ref 0–0.9)
ENA SCL70 AB SER-ACNC: <0.2 AI (ref 0–0.9)
ENA SM AB SER-ACNC: <0.2 AI (ref 0–0.9)
ENA SS-A AB SER-ACNC: <0.2 AI (ref 0–0.9)
ENA SS-B AB SER-ACNC: <0.2 AI (ref 0–0.9)
Lab: NORMAL
RIBOSOMAL P AB SER-ACNC: <0.2 AI (ref 0–0.9)
RIBOSOMAL P AB SER-ACNC: <0.2 AI (ref 0–0.9)

## 2020-02-20 ENCOUNTER — OFFICE VISIT (OUTPATIENT)
Dept: FAMILY MEDICINE CLINIC | Facility: CLINIC | Age: 75
End: 2020-02-20

## 2020-02-20 VITALS
BODY MASS INDEX: 26.68 KG/M2 | OXYGEN SATURATION: 99 % | DIASTOLIC BLOOD PRESSURE: 60 MMHG | HEART RATE: 76 BPM | HEIGHT: 66 IN | SYSTOLIC BLOOD PRESSURE: 110 MMHG | WEIGHT: 166 LBS

## 2020-02-20 DIAGNOSIS — I10 ESSENTIAL HYPERTENSION: Primary | Chronic | ICD-10-CM

## 2020-02-20 DIAGNOSIS — M51.36 DEGENERATIVE DISC DISEASE, LUMBAR: Chronic | ICD-10-CM

## 2020-02-20 DIAGNOSIS — M15.9 DEGENERATIVE JOINT DISEASE INVOLVING MULTIPLE JOINTS ON BOTH SIDES OF BODY: Chronic | ICD-10-CM

## 2020-02-20 DIAGNOSIS — M50.90 CERVICAL DISC DISEASE: Chronic | ICD-10-CM

## 2020-02-20 PROCEDURE — 99214 OFFICE O/P EST MOD 30 MIN: CPT | Performed by: GENERAL PRACTICE

## 2020-02-20 RX ORDER — AMITRIPTYLINE HYDROCHLORIDE 25 MG/1
25 TABLET, FILM COATED ORAL NIGHTLY
Qty: 90 TABLET | Refills: 3 | Status: SHIPPED | OUTPATIENT
Start: 2020-02-20 | End: 2020-12-16 | Stop reason: SDUPTHER

## 2020-02-20 RX ORDER — MINOCYCLINE HYDROCHLORIDE 100 MG/1
100 CAPSULE ORAL DAILY
COMMUNITY
End: 2020-09-21

## 2020-02-20 RX ORDER — HYDROCODONE BITARTRATE AND ACETAMINOPHEN 5; 325 MG/1; MG/1
1 TABLET ORAL EVERY 6 HOURS PRN
Qty: 120 TABLET | Refills: 0 | Status: SHIPPED | OUTPATIENT
Start: 2020-02-20 | End: 2020-03-30 | Stop reason: SDUPTHER

## 2020-03-16 ENCOUNTER — TELEPHONE (OUTPATIENT)
Dept: FAMILY MEDICINE CLINIC | Facility: CLINIC | Age: 75
End: 2020-03-16

## 2020-03-16 NOTE — TELEPHONE ENCOUNTER
Jaleesa is asking what she needs to do?  She said, she has a head cold with congestion and no fever.  She doesn't want to come in though.

## 2020-03-30 ENCOUNTER — TELEMEDICINE (OUTPATIENT)
Dept: FAMILY MEDICINE CLINIC | Facility: CLINIC | Age: 75
End: 2020-03-30

## 2020-03-30 DIAGNOSIS — M50.90 CERVICAL DISC DISEASE: Primary | Chronic | ICD-10-CM

## 2020-03-30 DIAGNOSIS — M15.9 DEGENERATIVE JOINT DISEASE INVOLVING MULTIPLE JOINTS ON BOTH SIDES OF BODY: Chronic | ICD-10-CM

## 2020-03-30 DIAGNOSIS — L71.9 ROSACEA: Chronic | ICD-10-CM

## 2020-03-30 DIAGNOSIS — M51.36 DEGENERATIVE DISC DISEASE, LUMBAR: Chronic | ICD-10-CM

## 2020-03-30 PROCEDURE — 99214 OFFICE O/P EST MOD 30 MIN: CPT | Performed by: GENERAL PRACTICE

## 2020-03-30 RX ORDER — HYDROCODONE BITARTRATE AND ACETAMINOPHEN 5; 325 MG/1; MG/1
1 TABLET ORAL EVERY 6 HOURS PRN
Qty: 120 TABLET | Refills: 0 | Status: SHIPPED | OUTPATIENT
Start: 2020-03-30 | End: 2020-05-14 | Stop reason: SDUPTHER

## 2020-03-30 NOTE — PROGRESS NOTES
"Subjective   Jaleesa Nam is a 75 y.o. female.   No chief complaint on file.    You have chosen to receive care through a telehealth visit.  Do you consent to use a video/audio connection for your medical care today? Yes    For review and evaluation of management of chronic medical problems.  She is having a lot of pain in her neck and feet, this is making it difficult for her to do very much.  She will \"work in the yard for short time and then has to come in on the last step.  Back bothers her with that activity as well.  She is managing with her medications although is having to take a little bit more of her hydrocodone.  She still has a rash on her face which has been diagnosed as rosacea.  She is taking minocycline and using metronidazole gel which she has run out of.  It is keeping it fairly well controlled.  Back Pain   This is a chronic problem. The current episode started more than 1 year ago. The problem occurs constantly. The problem is unchanged. The pain is present in the lumbar spine. The pain does not radiate. The pain is at a severity of 8/10. The pain is moderate. The pain is worse during the day. The symptoms are aggravated by bending, position, twisting and standing. Pertinent negatives include no abdominal pain, chest pain, dysuria, fever, headaches, numbness or weakness. She has tried analgesics for the symptoms. The treatment provided significant relief.   Arthritis   Presents for follow-up visit. She complains of pain, stiffness and joint warmth. She reports no joint swelling. The symptoms have been stable. Affected locations include the right shoulder, left shoulder, left MCP, right MCP, left knee, right knee, right hip, left hip and neck. Her pain is at a severity of 9/10. Associated symptoms include pain at night and rash. Pertinent negatives include no diarrhea, dysuria, fatigue, fever or Raynaud's syndrome. Compliance with total regimen is %. Compliance with medications is " %.   Neck Pain    This is a chronic problem. The current episode started more than 1 year ago. The problem occurs constantly. The problem has been gradually worsening. The pain is associated with nothing. The pain is present in the midline, right side and left side. The pain is at a severity of 8/10. The pain is severe. The symptoms are aggravated by position and twisting. The pain is same all the time. Stiffness is present in the morning. Pertinent negatives include no chest pain, fever, headaches, numbness, trouble swallowing or weakness. She has tried oral narcotics for the symptoms. The treatment provided moderate relief.      The following portions of the patient's history were reviewed and updated as appropriate: allergies, current medications, past social history and problem list.    Outpatient Medications Prior to Visit   Medication Sig Dispense Refill   • amitriptyline (ELAVIL) 25 MG tablet Take 1 tablet by mouth Every Night. 90 tablet 3   • amLODIPine (NORVASC) 10 MG tablet Take 1 tablet by mouth Daily. 90 tablet 3   • calcium carbonate-vitamin d (CALTRATE 600+D) 600-400 MG-UNIT per tablet Take 1 tablet by mouth daily.     • lisinopril (PRINIVIL,ZESTRIL) 20 MG tablet Take 20 mg by mouth Daily.     • minocycline (MINOCIN,DYNACIN) 100 MG capsule Take 100 mg by mouth Daily.     • tiZANidine (ZANAFLEX) 4 MG tablet Take 1 tablet by mouth Every 8 (Eight) Hours As Needed for Muscle Spasms. 30 tablet 1   • HYDROcodone-acetaminophen (NORCO) 5-325 MG per tablet Take 1 tablet by mouth Every 6 (Six) Hours As Needed for Moderate Pain (4-6). 120 tablet 0   • metroNIDAZOLE (METROCREAM) 0.75 % cream Apply  topically to the appropriate area as directed 2 (Two) Times a Day. 45 g 2     No facility-administered medications prior to visit.        Review of Systems   Constitutional: Negative.  Negative for chills, fatigue, fever and unexpected weight change.   HENT: Negative.  Negative for congestion, ear pain, hearing  loss, nosebleeds, rhinorrhea, sneezing, sore throat, tinnitus and trouble swallowing.    Eyes: Negative.  Negative for discharge.   Respiratory: Negative.  Negative for cough, shortness of breath and wheezing.    Cardiovascular: Negative.  Negative for chest pain and palpitations.   Gastrointestinal: Negative.  Negative for abdominal pain, constipation, diarrhea, nausea and vomiting.   Endocrine: Negative.    Genitourinary: Negative.  Negative for dysuria, frequency and urgency.   Musculoskeletal: Positive for arthralgias, arthritis, back pain, neck pain and stiffness. Negative for joint swelling and myalgias.   Skin: Positive for rash.   Allergic/Immunologic: Negative.    Neurological: Negative.  Negative for dizziness, weakness, numbness and headaches.   Hematological: Negative.  Negative for adenopathy.   Psychiatric/Behavioral: Negative.  Negative for dysphoric mood and sleep disturbance. The patient is not nervous/anxious.        Objective   There were no vitals taken for this visit.  Physical Exam   Constitutional: She appears well-developed and well-nourished.   HENT:   Head: Normocephalic and atraumatic.       Eyes: Pupils are equal, round, and reactive to light. EOM are normal.   Neck: Decreased range of motion present.   Pulmonary/Chest: Effort normal.   Neurological: She is alert.   Psychiatric: She has a normal mood and affect.     Notes brought forward are reviewed and updated if indicated.     Assessment/Plan   Problem List Items Addressed This Visit        Musculoskeletal and Integument    Degenerative joint disease involving multiple joints on both sides of body (Chronic)    Relevant Medications    HYDROcodone-acetaminophen (NORCO) 5-325 MG per tablet    Degenerative disc disease, lumbar (Chronic)    Relevant Medications    HYDROcodone-acetaminophen (NORCO) 5-325 MG per tablet    Cervical disc disease - Primary (Chronic)    Relevant Medications    HYDROcodone-acetaminophen (NORCO) 5-325 MG per tablet     Rosacea (Chronic)    Relevant Medications    metroNIDAZOLE (METROCREAM) 0.75 % cream         Continue current treatment.  Advised to pace herself with her activities and use nonmedicinal treatments for her joint pain as much as possible.  This would include ice, heat and arthritic rubs.  Ricki reviewed and appropriate. Not recommended to drive or operate heavy equipment while taking potentially sedating meds.  Patient understands the risks associated with this controlled medication, including tolerance and addiction. They also agree to obtain this medication only from me, and not from a another provider, unless that provider is covering for me in my absence. They also agree to be compliant in dosing, and not self adjust the dose of medication.  A signed controlled substance agreement is on file, and they have received a controlled substance education sheet at this or a previous visit. They have also signed a consent for treatment with a controlled substance as per [facility] policy.     The time that was spent in reviewing the patient's chart and addressing the patient's symptoms, diagnosis and treatment was 18  mins.      .   New Medications Ordered This Visit   Medications   • metroNIDAZOLE (METROCREAM) 0.75 % cream     Sig: Apply  topically to the appropriate area as directed 2 (Two) Times a Day.     Dispense:  45 g     Refill:  2   • HYDROcodone-acetaminophen (NORCO) 5-325 MG per tablet     Sig: Take 1 tablet by mouth Every 6 (Six) Hours As Needed for Moderate Pain (4-6).     Dispense:  120 tablet     Refill:  0     Return in about 6 weeks (around 5/11/2020) for Recheck.

## 2020-05-01 ENCOUNTER — TELEMEDICINE (OUTPATIENT)
Dept: FAMILY MEDICINE CLINIC | Facility: CLINIC | Age: 75
End: 2020-05-01

## 2020-05-01 VITALS — BODY MASS INDEX: 26.68 KG/M2 | HEIGHT: 66 IN | WEIGHT: 166 LBS

## 2020-05-01 DIAGNOSIS — M79.89 BILATERAL SWELLING OF FEET: Primary | ICD-10-CM

## 2020-05-01 DIAGNOSIS — M50.90 CERVICAL DISC DISEASE: Chronic | ICD-10-CM

## 2020-05-01 DIAGNOSIS — M51.36 DEGENERATIVE DISC DISEASE, LUMBAR: Chronic | ICD-10-CM

## 2020-05-01 PROCEDURE — 99213 OFFICE O/P EST LOW 20 MIN: CPT | Performed by: GENERAL PRACTICE

## 2020-05-01 RX ORDER — GABAPENTIN 300 MG/1
CAPSULE ORAL
Qty: 90 CAPSULE | Refills: 0 | Status: SHIPPED | OUTPATIENT
Start: 2020-05-01 | End: 2020-06-09 | Stop reason: SDUPTHER

## 2020-05-01 NOTE — PATIENT INSTRUCTIONS
Decrease amlodipine to one half tab daily.  Stop taking Aleve.  Monitor blood pressure.  Start gabapentin to see if this will help with neck and back pain.  Recheck as scheduled either by video visit or inpatient, will determine this a few days before appointment.

## 2020-05-01 NOTE — PROGRESS NOTES
Subjective   Jaleesa Nam is a 75 y.o. female.   Chief Complaint   Patient presents with   • Foot Swelling     both     You have chosen to receive care through a telehealth visit.  Do you consent to use a video/audio connection for your medical care today? Yes    Leg Swelling   This is a new problem. The current episode started 1 to 4 weeks ago. The problem occurs constantly. The problem has been gradually worsening. Pertinent negatives include no abdominal pain, arthralgias, chest pain, chills, congestion, coughing, fatigue, fever, headaches, joint swelling, myalgias, nausea, neck pain, numbness, rash, sore throat, swollen glands, urinary symptoms, vomiting or weakness. The symptoms are aggravated by standing. She has tried position changes for the symptoms. The treatment provided mild relief.   Is having a lot of neck and lower back pain so has been taking more Aleve.    The following portions of the patient's history were reviewed and updated as appropriate: allergies, current medications, past social history and problem list.    Outpatient Medications Prior to Visit   Medication Sig Dispense Refill   • amitriptyline (ELAVIL) 25 MG tablet Take 1 tablet by mouth Every Night. 90 tablet 3   • amLODIPine (NORVASC) 10 MG tablet Take 1 tablet by mouth Daily. 90 tablet 3   • calcium carbonate-vitamin d (CALTRATE 600+D) 600-400 MG-UNIT per tablet Take 1 tablet by mouth daily.     • HYDROcodone-acetaminophen (NORCO) 5-325 MG per tablet Take 1 tablet by mouth Every 6 (Six) Hours As Needed for Moderate Pain (4-6). 120 tablet 0   • lisinopril (PRINIVIL,ZESTRIL) 20 MG tablet Take 20 mg by mouth Daily.     • metroNIDAZOLE (METROCREAM) 0.75 % cream Apply  topically to the appropriate area as directed 2 (Two) Times a Day. 45 g 2   • minocycline (MINOCIN,DYNACIN) 100 MG capsule Take 100 mg by mouth Daily.     • tiZANidine (ZANAFLEX) 4 MG tablet Take 1 tablet by mouth Every 8 (Eight) Hours As Needed for Muscle Spasms. 30  "tablet 1     No facility-administered medications prior to visit.        Review of Systems   Constitutional: Negative.  Negative for chills, fatigue, fever and unexpected weight change.   HENT: Negative.  Negative for congestion, ear pain, hearing loss, nosebleeds, rhinorrhea, sneezing, sore throat and tinnitus.    Eyes: Negative.  Negative for discharge.   Respiratory: Negative.  Negative for cough, shortness of breath and wheezing.    Cardiovascular: Positive for leg swelling. Negative for chest pain and palpitations.   Gastrointestinal: Negative.  Negative for abdominal pain, constipation, diarrhea, nausea and vomiting.   Endocrine: Negative.    Genitourinary: Negative.  Negative for dysuria, frequency and urgency.   Musculoskeletal: Negative.  Negative for arthralgias, back pain, joint swelling, myalgias and neck pain.   Skin: Negative.  Negative for rash.   Allergic/Immunologic: Negative.    Neurological: Negative.  Negative for dizziness, weakness, numbness and headaches.   Hematological: Negative.  Negative for adenopathy.   Psychiatric/Behavioral: Negative.  Negative for dysphoric mood and sleep disturbance. The patient is not nervous/anxious.        Objective   Visit Vitals  Ht 167.6 cm (66\")   Wt 75.3 kg (166 lb)   BMI 26.79 kg/m²     Physical Exam   Constitutional: She is oriented to person, place, and time. She appears well-developed and well-nourished.   HENT:   Head: Normocephalic and atraumatic.   Eyes: Pupils are equal, round, and reactive to light. Conjunctivae and EOM are normal.   Cardiovascular:   Pedal edema bilaterally   Pulmonary/Chest: Effort normal.   Neurological: She is alert and oriented to person, place, and time.   Psychiatric: She has a normal mood and affect.   Vitals reviewed.      Assessment/Plan   Problem List Items Addressed This Visit        Musculoskeletal and Integument    Degenerative disc disease, lumbar (Chronic)    Relevant Medications    gabapentin (NEURONTIN) 300 MG " capsule    Cervical disc disease (Chronic)    Relevant Medications    gabapentin (NEURONTIN) 300 MG capsule      Other Visit Diagnoses     Bilateral swelling of feet    -  Primary            Patient Instructions   Decrease amlodipine to one half tab daily.  Stop taking Aleve.  Monitor blood pressure.  Start gabapentin to see if this will help with neck and back pain.  Recheck as scheduled either by video visit or inpatient, will determine this a few days before appointment.     This was an audio and video enabled telemedicine encounter. The time that was spent in reviewing the patient's chart and addressing their symptoms, diagnosis and treatment was18  mins.      New Medications Ordered This Visit   Medications   • gabapentin (NEURONTIN) 300 MG capsule     Si cap qhs x 7 days then 1 cap bid x 7 days then 1 cap qam and 2 caps qhs     Dispense:  90 capsule     Refill:  0     Return if symptoms worsen or fail to improve, for Next scheduled follow up.

## 2020-05-14 ENCOUNTER — OFFICE VISIT (OUTPATIENT)
Dept: FAMILY MEDICINE CLINIC | Facility: CLINIC | Age: 75
End: 2020-05-14

## 2020-05-14 VITALS
HEART RATE: 64 BPM | OXYGEN SATURATION: 98 % | SYSTOLIC BLOOD PRESSURE: 122 MMHG | DIASTOLIC BLOOD PRESSURE: 70 MMHG | WEIGHT: 169.6 LBS | BODY MASS INDEX: 27.26 KG/M2 | HEIGHT: 66 IN

## 2020-05-14 DIAGNOSIS — M15.9 DEGENERATIVE JOINT DISEASE INVOLVING MULTIPLE JOINTS ON BOTH SIDES OF BODY: Chronic | ICD-10-CM

## 2020-05-14 DIAGNOSIS — M51.36 DEGENERATIVE DISC DISEASE, LUMBAR: Chronic | ICD-10-CM

## 2020-05-14 DIAGNOSIS — M50.90 CERVICAL DISC DISEASE: Chronic | ICD-10-CM

## 2020-05-14 PROCEDURE — 99213 OFFICE O/P EST LOW 20 MIN: CPT | Performed by: GENERAL PRACTICE

## 2020-05-14 RX ORDER — TRIAMCINOLONE ACETONIDE 40 MG/ML
60 INJECTION, SUSPENSION INTRA-ARTICULAR; INTRAMUSCULAR ONCE
Status: DISCONTINUED | OUTPATIENT
Start: 2020-05-14 | End: 2021-08-06

## 2020-05-14 RX ORDER — HYDROCODONE BITARTRATE AND ACETAMINOPHEN 5; 325 MG/1; MG/1
1 TABLET ORAL EVERY 6 HOURS PRN
Qty: 120 TABLET | Refills: 0 | Status: SHIPPED | OUTPATIENT
Start: 2020-05-14 | End: 2020-07-27 | Stop reason: SDUPTHER

## 2020-05-14 NOTE — PROGRESS NOTES
Subjective   Jaleesa Nam is a 75 y.o. female.   Chief Complaint   Patient presents with   • Hypertension   • Back Pain   • Neck Pain   • Leg Pain     For review and evaluation of management of chronic medical problems. Pain is controlled with medications. No side effects.  Lower extremity swelling is improved since we decreased her amlodipine to 5 mg.  She has been checking her blood pressure and it is somewhat labile at home.  She did bring her monitor for us to check.  Hypertension   This is a chronic problem. The current episode started more than 1 year ago. The problem is unchanged. The problem is controlled. Associated symptoms include neck pain. Pertinent negatives include no chest pain, headaches, palpitations or shortness of breath. There are no associated agents to hypertension. There are no known risk factors for coronary artery disease. Current antihypertension treatment includes ACE inhibitors and diuretics. The current treatment provides moderate improvement. There are no compliance problems.    Back Pain   This is a new problem. The current episode started more than 1 month ago. The problem occurs constantly. The problem has been gradually worsening since onset. The pain is present in the gluteal and lumbar spine. The quality of the pain is described as aching. The pain does not radiate. The pain is at a severity of 9/10. The pain is moderate. The pain is the same all the time. The symptoms are aggravated by bending and sitting. Stiffness is present all day. Pertinent negatives include no abdominal pain, bladder incontinence, bowel incontinence, chest pain, dysuria, fever, headaches, numbness, paresis, paresthesias, pelvic pain, perianal numbness, weakness or weight loss. Risk factors include history of cancer. She has tried analgesics for the symptoms. The treatment provided significant relief.   Neck Pain    This is a chronic problem. The current episode started more than 1 year ago. The  problem occurs constantly. The problem has been gradually worsening. The pain is associated with nothing. The pain is present in the midline, right side and left side. The pain is at a severity of 9/10. The pain is severe. The symptoms are aggravated by position and twisting. The pain is same all the time. Stiffness is present in the morning. Pertinent negatives include no chest pain, fever, headaches, numbness, paresis, trouble swallowing, weakness or weight loss. She has tried oral narcotics for the symptoms. The treatment provided moderate relief.   Arthritis   Presents for follow-up visit. She complains of pain, stiffness and joint warmth. She reports no joint swelling. The symptoms have been stable. Affected locations include the right shoulder, left shoulder, left MCP, right MCP, left knee, right knee, right hip, left hip and neck. Her pain is at a severity of 9/10. Associated symptoms include pain at night. Pertinent negatives include no diarrhea, dysuria, fatigue, fever, rash, Raynaud's syndrome or weight loss. Compliance with total regimen is %. Compliance with medications is %.      The following portions of the patient's history were reviewed and updated as appropriate: allergies, current medications, past social history and problem list.    Outpatient Medications Prior to Visit   Medication Sig Dispense Refill   • amitriptyline (ELAVIL) 25 MG tablet Take 1 tablet by mouth Every Night. 90 tablet 3   • amLODIPine (NORVASC) 10 MG tablet Take 1 tablet by mouth Daily. 90 tablet 3   • calcium carbonate-vitamin d (CALTRATE 600+D) 600-400 MG-UNIT per tablet Take 1 tablet by mouth daily.     • gabapentin (NEURONTIN) 300 MG capsule 1 cap qhs x 7 days then 1 cap bid x 7 days then 1 cap qam and 2 caps qhs 90 capsule 0   • lisinopril (PRINIVIL,ZESTRIL) 20 MG tablet Take 20 mg by mouth Daily.     • metroNIDAZOLE (METROCREAM) 0.75 % cream Apply  topically to the appropriate area as directed 2 (Two) Times a  "Day. 45 g 2   • minocycline (MINOCIN,DYNACIN) 100 MG capsule Take 100 mg by mouth Daily.     • HYDROcodone-acetaminophen (NORCO) 5-325 MG per tablet Take 1 tablet by mouth Every 6 (Six) Hours As Needed for Moderate Pain (4-6). 120 tablet 0     No facility-administered medications prior to visit.        Review of Systems   Constitutional: Negative.  Negative for chills, fatigue, fever, unexpected weight change and weight loss.   HENT: Negative.  Negative for congestion, ear pain, hearing loss, nosebleeds, rhinorrhea, sneezing, sore throat, tinnitus and trouble swallowing.    Eyes: Negative.  Negative for discharge.   Respiratory: Negative.  Negative for cough, shortness of breath and wheezing.    Cardiovascular: Negative.  Negative for chest pain and palpitations.   Gastrointestinal: Negative.  Negative for abdominal pain, bowel incontinence, constipation, diarrhea, nausea and vomiting.   Endocrine: Negative.    Genitourinary: Negative.  Negative for bladder incontinence, dysuria, frequency, pelvic pain and urgency.   Musculoskeletal: Positive for arthritis, back pain, neck pain and stiffness. Negative for arthralgias, joint swelling and myalgias.   Skin: Negative.  Negative for rash.   Allergic/Immunologic: Negative.    Neurological: Negative.  Negative for dizziness, weakness, numbness, headaches and paresthesias.   Hematological: Negative.  Negative for adenopathy.   Psychiatric/Behavioral: Negative.  Negative for dysphoric mood and sleep disturbance. The patient is not nervous/anxious.        Objective   Visit Vitals  /70 (BP Location: Left arm)   Pulse 64   Ht 167.6 cm (66\")   Wt 76.9 kg (169 lb 9.6 oz)   SpO2 98%   BMI 27.37 kg/m²     Physical Exam   Constitutional: She is oriented to person, place, and time. She appears well-developed and well-nourished. No distress.   HENT:   Head: Normocephalic and atraumatic.   Nose: Nose normal.   Mouth/Throat: Oropharynx is clear and moist.   Eyes: Pupils are equal, " round, and reactive to light. Conjunctivae and EOM are normal. Right eye exhibits no discharge. Left eye exhibits no discharge.   Neck: Muscular tenderness present. Decreased range of motion present. No thyromegaly present.   Cardiovascular: Normal rate, regular rhythm, normal heart sounds and intact distal pulses.   Pulmonary/Chest: Effort normal and breath sounds normal.   Musculoskeletal:        Right shoulder: She exhibits tenderness.        Left shoulder: She exhibits tenderness.        Left knee: Tenderness found.        Lumbar back: She exhibits decreased range of motion and tenderness.   Lymphadenopathy:     She has no cervical adenopathy.   Neurological: She is alert and oriented to person, place, and time.   Skin: Skin is warm and dry.   Psychiatric: She has a normal mood and affect.   Nursing note and vitals reviewed.    Notes brought forward are reviewed and updated if indicated.     Assessment/Plan   Problem List Items Addressed This Visit        Musculoskeletal and Integument    Degenerative joint disease involving multiple joints on both sides of body (Chronic)    Relevant Medications    HYDROcodone-acetaminophen (NORCO) 5-325 MG per tablet    triamcinolone acetonide (KENALOG-40) injection 60 mg    Degenerative disc disease, lumbar (Chronic)    Relevant Medications    HYDROcodone-acetaminophen (NORCO) 5-325 MG per tablet    triamcinolone acetonide (KENALOG-40) injection 60 mg    Cervical disc disease (Chronic)    Relevant Medications    HYDROcodone-acetaminophen (NORCO) 5-325 MG per tablet         Ricki reviewed and appropriate. Not recommended to drive or operate heavy equipment while taking potentially sedating meds.  Patient understands the risks associated with this controlled medication, including tolerance and addiction. They also agree to obtain this medication only from me, and not from a another provider, unless that provider is covering for me in my absence. They also agree to be compliant  in dosing, and not self adjust the dose of medication.  A signed controlled substance agreement is on file, and they have received a controlled substance education sheet at this or a previous visit. They have also signed a consent for treatment with a controlled substance as per [facility] policy.      Patient Instructions   Continue current treatment.  By any new blood pressure monitor if possible as yours is not accurate.       New Medications Ordered This Visit   Medications   • HYDROcodone-acetaminophen (NORCO) 5-325 MG per tablet     Sig: Take 1 tablet by mouth Every 6 (Six) Hours As Needed for Moderate Pain (4-6).     Dispense:  120 tablet     Refill:  0   • triamcinolone acetonide (KENALOG-40) injection 60 mg     Return in about 6 weeks (around 6/25/2020) for Recheck.  Answers for HPI/ROS submitted by the patient on 5/12/2020   Back pain  What is the primary reason for your visit?: Back Pain

## 2020-05-14 NOTE — PATIENT INSTRUCTIONS
Continue current treatment.  By any new blood pressure monitor if possible as yours is not accurate.

## 2020-06-06 DIAGNOSIS — M50.90 CERVICAL DISC DISEASE: Chronic | ICD-10-CM

## 2020-06-06 DIAGNOSIS — M51.36 DEGENERATIVE DISC DISEASE, LUMBAR: Chronic | ICD-10-CM

## 2020-06-08 NOTE — TELEPHONE ENCOUNTER
Dr. Wright    Ms. Jaleesa Nam is requesting a refill on her Gabapentin 300 mg #90      Last OV    05/14/2020    Next Gino OV    Visit date not found    Last Script Written  05/01/2020  #90      Last Ricki     05/14/2020    Please advise on refill    Thank you

## 2020-06-09 ENCOUNTER — TELEPHONE (OUTPATIENT)
Dept: FAMILY MEDICINE CLINIC | Facility: CLINIC | Age: 75
End: 2020-06-09

## 2020-06-09 DIAGNOSIS — M51.36 DEGENERATIVE DISC DISEASE, LUMBAR: Chronic | ICD-10-CM

## 2020-06-09 DIAGNOSIS — M50.90 CERVICAL DISC DISEASE: Chronic | ICD-10-CM

## 2020-06-09 RX ORDER — GABAPENTIN 300 MG/1
CAPSULE ORAL
Qty: 90 CAPSULE | Refills: 2 | Status: SHIPPED | OUTPATIENT
Start: 2020-06-09 | End: 2020-06-09 | Stop reason: SDUPTHER

## 2020-06-09 RX ORDER — GABAPENTIN 300 MG/1
CAPSULE ORAL
Qty: 90 CAPSULE | Refills: 2 | Status: SHIPPED | OUTPATIENT
Start: 2020-06-09 | End: 2020-08-27 | Stop reason: SDUPTHER

## 2020-06-09 RX ORDER — GABAPENTIN 300 MG/1
CAPSULE ORAL
Qty: 90 CAPSULE | Refills: 0 | Status: SHIPPED | OUTPATIENT
Start: 2020-06-09 | End: 2020-08-09 | Stop reason: SDUPTHER

## 2020-06-09 NOTE — TELEPHONE ENCOUNTER
Patient called advised that she is completely out of gabapentin (NEURONTIN) 300 MG capsule. Had put in request prior for refill and has heard nothing so is requesting a callback. Patient can be reached at [phone]

## 2020-06-10 RX ORDER — LISINOPRIL 20 MG/1
TABLET ORAL
Qty: 90 TABLET | Refills: 2 | Status: SHIPPED | OUTPATIENT
Start: 2020-06-10 | End: 2021-02-19 | Stop reason: SDUPTHER

## 2020-07-27 ENCOUNTER — OFFICE VISIT (OUTPATIENT)
Dept: FAMILY MEDICINE CLINIC | Facility: CLINIC | Age: 75
End: 2020-07-27

## 2020-07-27 VITALS
DIASTOLIC BLOOD PRESSURE: 60 MMHG | BODY MASS INDEX: 25.28 KG/M2 | OXYGEN SATURATION: 97 % | WEIGHT: 157.3 LBS | SYSTOLIC BLOOD PRESSURE: 120 MMHG | HEIGHT: 66 IN | HEART RATE: 62 BPM

## 2020-07-27 DIAGNOSIS — M15.9 DEGENERATIVE JOINT DISEASE INVOLVING MULTIPLE JOINTS ON BOTH SIDES OF BODY: Chronic | ICD-10-CM

## 2020-07-27 DIAGNOSIS — I10 ESSENTIAL HYPERTENSION: Primary | Chronic | ICD-10-CM

## 2020-07-27 DIAGNOSIS — M50.90 CERVICAL DISC DISEASE: Chronic | ICD-10-CM

## 2020-07-27 DIAGNOSIS — E78.2 MIXED HYPERLIPIDEMIA: ICD-10-CM

## 2020-07-27 DIAGNOSIS — M51.36 DEGENERATIVE DISC DISEASE, LUMBAR: Chronic | ICD-10-CM

## 2020-07-27 PROCEDURE — 99213 OFFICE O/P EST LOW 20 MIN: CPT | Performed by: GENERAL PRACTICE

## 2020-07-27 RX ORDER — HYDROCODONE BITARTRATE AND ACETAMINOPHEN 5; 325 MG/1; MG/1
1 TABLET ORAL EVERY 6 HOURS PRN
Qty: 120 TABLET | Refills: 0 | Status: SHIPPED | OUTPATIENT
Start: 2020-07-27 | End: 2020-08-27 | Stop reason: SDUPTHER

## 2020-08-03 DIAGNOSIS — Z12.31 ENCOUNTER FOR SCREENING MAMMOGRAM FOR MALIGNANT NEOPLASM OF BREAST: Primary | ICD-10-CM

## 2020-08-25 ENCOUNTER — LAB (OUTPATIENT)
Dept: LAB | Facility: HOSPITAL | Age: 75
End: 2020-08-25

## 2020-08-25 DIAGNOSIS — I10 ESSENTIAL HYPERTENSION: ICD-10-CM

## 2020-08-25 DIAGNOSIS — E78.2 MIXED HYPERLIPIDEMIA: ICD-10-CM

## 2020-08-25 LAB
ALBUMIN SERPL-MCNC: 4.3 G/DL (ref 3.5–5.2)
ALBUMIN/GLOB SERPL: 1.5 G/DL
ALP SERPL-CCNC: 87 U/L (ref 39–117)
ALT SERPL W P-5'-P-CCNC: 18 U/L (ref 1–33)
ANION GAP SERPL CALCULATED.3IONS-SCNC: 11.3 MMOL/L (ref 5–15)
AST SERPL-CCNC: 21 U/L (ref 1–32)
BACTERIA UR QL AUTO: ABNORMAL /HPF
BASOPHILS # BLD AUTO: 0.06 10*3/MM3 (ref 0–0.2)
BASOPHILS NFR BLD AUTO: 1 % (ref 0–1.5)
BILIRUB SERPL-MCNC: 0.6 MG/DL (ref 0–1.2)
BILIRUB UR QL STRIP: NEGATIVE
BUN SERPL-MCNC: 17 MG/DL (ref 8–23)
BUN/CREAT SERPL: 17.5 (ref 7–25)
CALCIUM SPEC-SCNC: 9.9 MG/DL (ref 8.6–10.5)
CHLORIDE SERPL-SCNC: 102 MMOL/L (ref 98–107)
CHOLEST SERPL-MCNC: 155 MG/DL (ref 0–200)
CLARITY UR: CLEAR
CO2 SERPL-SCNC: 26.7 MMOL/L (ref 22–29)
COLOR UR: YELLOW
CREAT SERPL-MCNC: 0.97 MG/DL (ref 0.57–1)
DEPRECATED RDW RBC AUTO: 43.3 FL (ref 37–54)
EOSINOPHIL # BLD AUTO: 0.11 10*3/MM3 (ref 0–0.4)
EOSINOPHIL NFR BLD AUTO: 1.8 % (ref 0.3–6.2)
ERYTHROCYTE [DISTWIDTH] IN BLOOD BY AUTOMATED COUNT: 12.4 % (ref 12.3–15.4)
GFR SERPL CREATININE-BSD FRML MDRD: 56 ML/MIN/1.73
GLOBULIN UR ELPH-MCNC: 2.8 GM/DL
GLUCOSE SERPL-MCNC: 94 MG/DL (ref 65–99)
GLUCOSE UR STRIP-MCNC: NEGATIVE MG/DL
HCT VFR BLD AUTO: 45.6 % (ref 34–46.6)
HDLC SERPL-MCNC: 47 MG/DL (ref 40–60)
HGB BLD-MCNC: 15.1 G/DL (ref 12–15.9)
HGB UR QL STRIP.AUTO: NEGATIVE
HYALINE CASTS UR QL AUTO: ABNORMAL /LPF
IMM GRANULOCYTES # BLD AUTO: 0.01 10*3/MM3 (ref 0–0.05)
IMM GRANULOCYTES NFR BLD AUTO: 0.2 % (ref 0–0.5)
KETONES UR QL STRIP: NEGATIVE
LDLC SERPL CALC-MCNC: 89 MG/DL (ref 0–100)
LDLC/HDLC SERPL: 1.89 {RATIO}
LEUKOCYTE ESTERASE UR QL STRIP.AUTO: ABNORMAL
LYMPHOCYTES # BLD AUTO: 3.14 10*3/MM3 (ref 0.7–3.1)
LYMPHOCYTES NFR BLD AUTO: 52.7 % (ref 19.6–45.3)
MCH RBC QN AUTO: 31.4 PG (ref 26.6–33)
MCHC RBC AUTO-ENTMCNC: 33.1 G/DL (ref 31.5–35.7)
MCV RBC AUTO: 94.8 FL (ref 79–97)
MONOCYTES # BLD AUTO: 0.56 10*3/MM3 (ref 0.1–0.9)
MONOCYTES NFR BLD AUTO: 9.4 % (ref 5–12)
NEUTROPHILS NFR BLD AUTO: 2.08 10*3/MM3 (ref 1.7–7)
NEUTROPHILS NFR BLD AUTO: 34.9 % (ref 42.7–76)
NITRITE UR QL STRIP: NEGATIVE
NRBC BLD AUTO-RTO: 0 /100 WBC (ref 0–0.2)
PH UR STRIP.AUTO: 7 [PH] (ref 5–8)
PLATELET # BLD AUTO: 412 10*3/MM3 (ref 140–450)
PMV BLD AUTO: 10.3 FL (ref 6–12)
POTASSIUM SERPL-SCNC: 4.4 MMOL/L (ref 3.5–5.2)
PROT SERPL-MCNC: 7.1 G/DL (ref 6–8.5)
PROT UR QL STRIP: NEGATIVE
RBC # BLD AUTO: 4.81 10*6/MM3 (ref 3.77–5.28)
RBC # UR: ABNORMAL /HPF
REF LAB TEST METHOD: ABNORMAL
SODIUM SERPL-SCNC: 140 MMOL/L (ref 136–145)
SP GR UR STRIP: 1.01 (ref 1–1.03)
SQUAMOUS #/AREA URNS HPF: ABNORMAL /HPF
TRIGL SERPL-MCNC: 97 MG/DL (ref 0–150)
UROBILINOGEN UR QL STRIP: ABNORMAL
VLDLC SERPL-MCNC: 19.4 MG/DL (ref 5–40)
WBC # BLD AUTO: 5.96 10*3/MM3 (ref 3.4–10.8)
WBC UR QL AUTO: ABNORMAL /HPF

## 2020-08-25 PROCEDURE — 85025 COMPLETE CBC W/AUTO DIFF WBC: CPT

## 2020-08-25 PROCEDURE — 87086 URINE CULTURE/COLONY COUNT: CPT

## 2020-08-25 PROCEDURE — 80053 COMPREHEN METABOLIC PANEL: CPT

## 2020-08-25 PROCEDURE — 81001 URINALYSIS AUTO W/SCOPE: CPT

## 2020-08-25 PROCEDURE — 80061 LIPID PANEL: CPT

## 2020-08-25 PROCEDURE — 36415 COLL VENOUS BLD VENIPUNCTURE: CPT

## 2020-08-26 LAB — BACTERIA SPEC AEROBE CULT: NO GROWTH

## 2020-08-27 ENCOUNTER — APPOINTMENT (OUTPATIENT)
Dept: CT IMAGING | Facility: HOSPITAL | Age: 75
End: 2020-08-27

## 2020-08-27 ENCOUNTER — OFFICE VISIT (OUTPATIENT)
Dept: FAMILY MEDICINE CLINIC | Facility: CLINIC | Age: 75
End: 2020-08-27

## 2020-08-27 ENCOUNTER — HOSPITAL ENCOUNTER (EMERGENCY)
Facility: HOSPITAL | Age: 75
Discharge: HOME | End: 2020-08-28
Attending: EMERGENCY MEDICINE | Admitting: EMERGENCY MEDICINE

## 2020-08-27 VITALS
WEIGHT: 155.8 LBS | OXYGEN SATURATION: 98 % | DIASTOLIC BLOOD PRESSURE: 60 MMHG | SYSTOLIC BLOOD PRESSURE: 110 MMHG | HEIGHT: 66 IN | BODY MASS INDEX: 25.04 KG/M2 | HEART RATE: 66 BPM

## 2020-08-27 DIAGNOSIS — M15.9 DEGENERATIVE JOINT DISEASE INVOLVING MULTIPLE JOINTS ON BOTH SIDES OF BODY: Chronic | ICD-10-CM

## 2020-08-27 DIAGNOSIS — S09.90XA MINOR HEAD INJURY, INITIAL ENCOUNTER: ICD-10-CM

## 2020-08-27 DIAGNOSIS — M51.36 DEGENERATIVE DISC DISEASE, LUMBAR: Chronic | ICD-10-CM

## 2020-08-27 DIAGNOSIS — I10 ESSENTIAL HYPERTENSION: Chronic | ICD-10-CM
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
## 2020-08-27 DIAGNOSIS — M50.90 CERVICAL DISC DISEASE: Chronic | ICD-10-CM

## 2020-08-27 DIAGNOSIS — Z00.00 MEDICARE ANNUAL WELLNESS VISIT, SUBSEQUENT: Primary | ICD-10-CM

## 2020-08-27 DIAGNOSIS — S01.81XA LACERATION OF FOREHEAD, INITIAL ENCOUNTER: ICD-10-CM

## 2020-08-27 PROCEDURE — 72125 CT NECK SPINE W/O DYE: CPT

## 2020-08-27 PROCEDURE — 99284 EMERGENCY DEPT VISIT MOD MDM: CPT

## 2020-08-27 PROCEDURE — 99214 OFFICE O/P EST MOD 30 MIN: CPT | Performed by: GENERAL PRACTICE

## 2020-08-27 PROCEDURE — 70450 CT HEAD/BRAIN W/O DYE: CPT

## 2020-08-27 PROCEDURE — G0439 PPPS, SUBSEQ VISIT: HCPCS | Performed by: GENERAL PRACTICE

## 2020-08-27 RX ORDER — GABAPENTIN 300 MG/1
CAPSULE ORAL
Qty: 270 CAPSULE | Refills: 0 | Status: SHIPPED | OUTPATIENT
Start: 2020-08-27 | End: 2020-12-01

## 2020-08-27 RX ORDER — LIDOCAINE HYDROCHLORIDE AND EPINEPHRINE 10; 10 MG/ML; UG/ML
10 INJECTION, SOLUTION INFILTRATION; PERINEURAL ONCE
Status: COMPLETED | OUTPATIENT
Start: 2020-08-27 | End: 2020-08-28

## 2020-08-27 RX ORDER — HYDROCODONE BITARTRATE AND ACETAMINOPHEN 5; 325 MG/1; MG/1
1 TABLET ORAL ONCE
Status: COMPLETED | OUTPATIENT
Start: 2020-08-27 | End: 2020-08-28

## 2020-08-27 RX ORDER — MUPIROCIN CALCIUM 20 MG/G
CREAM TOPICAL 3 TIMES DAILY PRN
Qty: 30 G | Refills: 0 | Status: SHIPPED | OUTPATIENT
Start: 2020-08-27 | End: 2020-10-27

## 2020-08-27 RX ORDER — DIAPER,BRIEF,INFANT-TODD,DISP
EACH MISCELLANEOUS EVERY 12 HOURS SCHEDULED
Status: DISCONTINUED | OUTPATIENT
Start: 2020-08-27 | End: 2020-08-28 | Stop reason: HOSPADM

## 2020-08-27 RX ORDER — HYDROCODONE BITARTRATE AND ACETAMINOPHEN 5; 325 MG/1; MG/1
1 TABLET ORAL EVERY 6 HOURS PRN
Qty: 120 TABLET | Refills: 0 | Status: SHIPPED | OUTPATIENT
Start: 2020-08-27 | End: 2020-10-27 | Stop reason: SDUPTHER

## 2020-08-27 NOTE — PATIENT INSTRUCTIONS
Check at pharmacy on Shingrix shingles vaccine  Voltaren gel over the counter     Medicare Wellness  Personal Prevention Plan of Service     Date of Office Visit:  2020  Encounter Provider:  Emi Wright MD  Place of Service:  [facility]  Patient Name: Jaleesa Nam  :  1945    As part of the Medicare Wellness portion of your visit today, we are providing you with this personalized preventive plan of services (PPPS). This plan is based upon recommendations of the [place] Preventive Services Task Force (USPSTF) and the Advisory Committee on Immunization Practices (ACIP).    This lists the preventive care services that should be considered, and provides dates of when you are due. Items listed as completed are up-to-date and do not require any further intervention.    Health Maintenance   Topic Date Due   • ZOSTER VACCINE (2 of 2) 2017   • MEDICARE ANNUAL WELLNESS  2020   • INFLUENZA VACCINE  2020   • LIPID PANEL  2021   • MAMMOGRAM  2021   • COLOGUARD  2022   • TDAP/TD VACCINES (3 - Td) 10/26/2028   • HEPATITIS C SCREENING  Completed   • Pneumococcal Vaccine Once at 65 Years Old  Completed       No orders of the defined types were placed in this encounter.      Return in about 2 months (around 10/27/2020) for Recheck.

## 2020-08-27 NOTE — PROGRESS NOTES
The ABCs of the Annual Wellness Visit  Subsequent Medicare Wellness Visit    Chief Complaint   Patient presents with   • Annual Exam     labs [facility] medic[facility] wellness       Subjective   History of Present Illness:  Jaleesa Nam is a 75 y.o. female who presents for a Subsequent Medicare Wellness Visit.    HEALTH RISK ASSESSMENT    Recent Hospitalizations:  No hospitalization(s) within the last year.    Current Medical Providers:  Patient Care Team:  Emi Wright MD as PCP - General  Emi Wright MD as PCP - Claims Attributed    Smoking Status:  Social History     Tobacco Use   Smoking Status Never Smoker   Smokeless Tobacco Never Used       Alcohol Consumption:  Social History     Substance and Sexual Activity   Alcohol Use No   • Frequency: Never       Depression Screen:   PHQ-2/PHQ-9 Depression Screening 8/26/2019   Little interest or pleasure in doing things 0   Feeling down, depressed, or hopeless 0   Trouble falling or staying asleep, or sleeping too much 0   Feeling tired or having little energy 0   Poor appetite or overeating 0   Feeling bad about yourself - or that you are a failure or have let yourself or your family down 0   Trouble concentrating on things, such as reading the newspaper or watching television 0   Moving or speaking so slowly that other people could have noticed. Or the opposite - being so fidgety or restless that you have been moving around a lot more than usual 0   Thoughts that you would be better off dead, or of hurting yourself in some way 0   Total Score 0       Fall Risk Screen:  STEADI Fall Risk Assessment was completed, and patient is at MODERATE risk for falls. Assessment completed on:7/27/2020    Health Habits and Functional and Cognitive Screening:  Functional & Cognitive Status 8/26/2019   Do you have difficulty preparing food and eating? No   Do you have difficulty bathing yourself, getting dressed or grooming yourself? No   Do you have difficulty using the  toilet? No   Do you have difficulty moving around from place to place? No   Do you have trouble with steps or getting out of a bed or a chair? No   Current Diet Well Balanced Diet   Dental Exam Up to date   Eye Exam Up to date   Exercise (times per week) 2 times per week   Current Exercise Activities Include Walking   Do you need help using the phone?  No   Are you deaf or do you have serious difficulty hearing?  Yes   Do you need help with transportation? No   Do you need help shopping? No   Do you need help preparing meals?  No   Do you need help with housework?  No   Do you need help with laundry? No   Do you need help taking your medications? No   Do you need help managing money? No   Do you ever drive or ride in a car without wearing a seat belt? No   Have you felt unusual stress, anger or loneliness in the last month? No   Who do you live with? Spouse   If you need help, do you have trouble finding someone available to you? No   Have you been bothered in the last four weeks by sexual problems? No   Do you have difficulty concentrating, remembering or making decisions? No         Does the patient have evidence of cognitive impairment? No    Asprin use counseling:Taking ASA appropriately as indicated    Age-appropriate Screening Schedule:  Refer to the list below for future screening recommendations based on patient's age, sex and/or medical conditions. Orders for these recommended tests are listed in the plan section. The patient has been provided with a written plan.    Health Maintenance   Topic Date Due   • ZOSTER VACCINE (2 of 2) 08/07/2017   • INFLUENZA VACCINE  08/01/2020   • LIPID PANEL  08/25/2021   • MAMMOGRAM  08/26/2021   • TDAP/TD VACCINES (3 - Td) 10/26/2028          The following portions of the patient's history were reviewed and updated as appropriate: allergies, current medications, past family history, past medical history, past social history, past surgical history and problem  list.    Outpatient Medications Prior to Visit   Medication Sig Dispense Refill   • amitriptyline (ELAVIL) 25 MG tablet Take 1 tablet by mouth Every Night. 90 tablet 3   • amLODIPine (NORVASC) 10 MG tablet Take 1 tablet by mouth Daily. 90 tablet 3   • calcium carbonate-vitamin d (CALTRATE 600+D) 600-400 MG-UNIT per tablet Take 1 tablet by mouth daily.     • gabapentin (NEURONTIN) 300 MG capsule 1 cap qam and 2 caps qhs 90 capsule 2   • HYDROcodone-acetaminophen (NORCO) 5-325 MG per tablet Take 1 tablet by mouth Every 6 (Six) Hours As Needed for Moderate Pain (4-6). 120 tablet 0   • lisinopril (PRINIVIL,ZESTRIL) 20 MG tablet TAKE 1 TABLET BY MOUTH EVERY DAY 90 tablet 2   • metroNIDAZOLE (METROCREAM) 0.75 % cream Apply  topically to the appropriate area as directed 2 (Two) Times a Day. 45 g 2   • minocycline (MINOCIN,DYNACIN) 100 MG capsule Take 100 mg by mouth Daily.       Facility-Administered Medications Prior to Visit   Medication Dose Route Frequency Provider Last Rate Last Dose   • triamcinolone acetonide (KENALOG-40) injection 60 mg  60 mg Intramuscular Once Emi Wright MD           Patient Active Problem List   Diagnosis   • Degenerative joint disease involving multiple joints on both sides of body   • Essential hypertension   • Degenerative disc disease, lumbar   • Foot pain, left   • Cervical disc disease   • Osteopenia of neck of left femur   • Rosacea       Advanced Care Planning:  ACP discussion was held with the patient during this visit. Patient has an advance directive in EMR which is still valid.     Review of Systems   Constitutional: Negative.  Negative for chills, fatigue, fever and unexpected weight change.   HENT: Negative.  Negative for congestion, ear pain, hearing loss, nosebleeds, rhinorrhea, sneezing, sore throat and tinnitus.    Eyes: Negative.  Negative for discharge.   Respiratory: Negative.  Negative for cough, shortness of breath and wheezing.    Cardiovascular: Negative.   "Negative for chest pain and palpitations.   Gastrointestinal: Negative.  Negative for abdominal pain, constipation, diarrhea, nausea and vomiting.   Endocrine: Negative.    Genitourinary: Negative.  Negative for dysuria, frequency and urgency.   Musculoskeletal: Negative.  Negative for arthralgias, back pain, joint swelling, myalgias and neck pain.   Skin: Negative.  Negative for rash.   Allergic/Immunologic: Negative.    Neurological: Negative.  Negative for dizziness, weakness, numbness and headaches.   Hematological: Negative.  Negative for adenopathy.   Psychiatric/Behavioral: Negative.  Negative for dysphoric mood and sleep disturbance. The patient is not nervous/anxious.    All other systems reviewed and are negative.      Compared to one year ago, the patient feels her physical health is the same.  Compared to one year ago, the patient feels her mental health is the same.    Reviewed chart for potential of high risk medication in the elderly: yes  Reviewed chart for potential of harmful drug interactions in the elderly:not applicable    Objective         Vitals:    08/27/20 1035   BP: 110/60   Pulse: 66   SpO2: 98%   Weight: 70.7 kg (155 lb 12.8 oz)   Height: 167.6 cm (66\")   PainSc:   8   PainLoc: Back  Comment: neck       Body mass index is 25.15 kg/m².  Discussed the patient's BMI with her. The BMI is in the acceptable range.    Physical Exam   Constitutional: She is oriented to person, place, and time. She appears well-developed and well-nourished. No distress.   HENT:   Head: Normocephalic and atraumatic.   Nose: Nose normal.   Mouth/Throat: Oropharynx is clear and moist.   Eyes: Pupils are equal, round, and reactive to light. Conjunctivae and EOM are normal. Right eye exhibits no discharge. Left eye exhibits no discharge.   Neck: No thyromegaly present.   Cardiovascular: Normal rate, regular rhythm, normal heart sounds and intact distal pulses.   Pulmonary/Chest: Effort normal and breath sounds normal. "   Lymphadenopathy:     She has no cervical adenopathy.   Neurological: She is alert and oriented to person, place, and time.   Skin: Skin is warm and dry.   Psychiatric: She has a normal mood and affect.   Nursing note and vitals reviewed.      Lab Results   Component Value Date    TRIG 97 08/25/2020    HDL 47 08/25/2020    LDL 89 08/25/2020    VLDL 19.4 08/25/2020        Assessment/Plan   Medicare Risks and Personalized Health Plan  CMS Preventative Services Quick Reference  Advance Directive Discussion  Breast Cancer/Mammogram Screening  Chronic Pain   Fall Risk  Immunizations Discussed/Encouraged (specific immunizations; Shingrix )    The above risks/problems have been discussed with the patient.  Pertinent information has been shared with the patient in the After Visit Summary.  Follow up plans and orders are seen below in the Assessment/Plan Section.    There are no diagnoses linked to this encounter.  Follow Up:  No follow-ups on file.     An After Visit Summary and PPPS were given to the patient.   Information has been scanned into chart. Discussed importance of taking medications as prescribed. Encouraged healthy eating habits with low fat, low salt choices and working towards maintaining a healthy weight. Recommended regular exercise if able as well as care to prevent falls including avoiding anything on the floor that they could slip or trip on such as throw rugs, making sure they have a bathmat to step onto when their feet are wet and having grab bars and railings where needed.

## 2020-08-27 NOTE — PROGRESS NOTES
Subjective   Jaleesa Nam is a 75 y.o. female.     Chief Complaint   Patient presents with   • Annual Exam     labs [facility] wellness     For review and evaluation of management of chronic medical problems. Due for mammogram. Pain is controlled with medications. No side effects.   Neck Pain    This is a chronic problem. The current episode started more than 1 year ago. The problem occurs constantly. The problem has been gradually worsening. The pain is associated with nothing. The pain is present in the midline, right side and left side. The pain is at a severity of 8/10. The pain is severe. The symptoms are aggravated by position and twisting. The pain is same all the time. Stiffness is present in the morning. Pertinent negatives include no chest pain, fever, headaches, numbness, paresis, trouble swallowing, weakness or weight loss. She has tried oral narcotics for the symptoms. The treatment provided moderate relief.   Back Pain   This is a new problem. The current episode started more than 1 month ago. The problem occurs constantly. The problem has been gradually worsening since onset. The pain is present in the gluteal and lumbar spine. The quality of the pain is described as aching. The pain does not radiate. The pain is at a severity of 8/10. The pain is moderate. The pain is the same all the time. The symptoms are aggravated by bending and sitting. Stiffness is present all day. Pertinent negatives include no abdominal pain, bladder incontinence, bowel incontinence, chest pain, dysuria, fever, headaches, numbness, paresis, paresthesias, pelvic pain, perianal numbness, weakness or weight loss. Risk factors include history of cancer. She has tried analgesics for the symptoms. The treatment provided significant relief.   Arthritis   Presents for follow-up visit. She complains of pain, stiffness and joint warmth. She reports no joint swelling. The symptoms have been stable. Affected locations include  the right shoulder, left shoulder, left MCP, right MCP, left knee, right knee, right hip, left hip and neck. Her pain is at a severity of 9/10. Associated symptoms include pain at night. Pertinent negatives include no diarrhea, dysuria, fatigue, fever, rash, Raynaud's syndrome or weight loss. Compliance with total regimen is %. Compliance with medications is %.   Hypertension   This is a chronic problem. The current episode started more than 1 year ago. The problem is unchanged. The problem is controlled. Associated symptoms include neck pain. Pertinent negatives include no chest pain, headaches, palpitations or shortness of breath. There are no associated agents to hypertension. There are no known risk factors for coronary artery disease. Current antihypertension treatment includes ACE inhibitors and diuretics. The current treatment provides moderate improvement. There are no compliance problems.       The following portions of the patient's history were reviewed and updated as appropriate: allergies, current medications, past family and social history and problem list.    Outpatient Medications Prior to Visit   Medication Sig Dispense Refill   • amitriptyline (ELAVIL) 25 MG tablet Take 1 tablet by mouth Every Night. 90 tablet 3   • amLODIPine (NORVASC) 10 MG tablet Take 1 tablet by mouth Daily. 90 tablet 3   • calcium carbonate-vitamin d (CALTRATE 600+D) 600-400 MG-UNIT per tablet Take 1 tablet by mouth daily.     • lisinopril (PRINIVIL,ZESTRIL) 20 MG tablet TAKE 1 TABLET BY MOUTH EVERY DAY 90 tablet 2   • metroNIDAZOLE (METROCREAM) 0.75 % cream Apply  topically to the appropriate area as directed 2 (Two) Times a Day. 45 g 2   • gabapentin (NEURONTIN) 300 MG capsule 1 cap qam and 2 caps qhs 90 capsule 2   • HYDROcodone-acetaminophen (NORCO) 5-325 MG per tablet Take 1 tablet by mouth Every 6 (Six) Hours As Needed for Moderate Pain (4-6). 120 tablet 0   • minocycline (MINOCIN,DYNACIN) 100 MG capsule Take  "100 mg by mouth Daily.       Facility-Administered Medications Prior to Visit   Medication Dose Route Frequency Provider Last Rate Last Dose   • triamcinolone acetonide (KENALOG-40) injection 60 mg  60 mg Intramuscular Once Emi Wright MD           Review of Systems   Constitutional: Negative.  Negative for chills, fatigue, fever, unexpected weight change and weight loss.   HENT: Negative.  Negative for congestion, ear pain, hearing loss, nosebleeds, rhinorrhea, sneezing, sore throat, tinnitus and trouble swallowing.    Eyes: Negative.  Negative for discharge.   Respiratory: Negative.  Negative for cough, shortness of breath and wheezing.    Cardiovascular: Negative.  Negative for chest pain and palpitations.   Gastrointestinal: Negative.  Negative for abdominal pain, bowel incontinence, constipation, diarrhea, nausea and vomiting.   Endocrine: Negative.    Genitourinary: Negative.  Negative for bladder incontinence, dysuria, frequency, pelvic pain and urgency.   Musculoskeletal: Positive for arthralgias, arthritis, back pain, neck pain and stiffness. Negative for joint swelling and myalgias.   Skin: Negative.  Negative for rash.   Allergic/Immunologic: Negative.    Neurological: Negative.  Negative for dizziness, weakness, numbness, headaches and paresthesias.   Hematological: Negative.  Negative for adenopathy.   Psychiatric/Behavioral: Negative.  Negative for dysphoric mood and sleep disturbance. The patient is not nervous/anxious.        Objective     Visit Vitals  /60   Pulse 66   Ht 167.6 cm (66\")   Wt 70.7 kg (155 lb 12.8 oz)   SpO2 98%   BMI 25.15 kg/m²     Physical Exam   Constitutional: She is oriented to person, place, and time. She appears well-developed and well-nourished. No distress.   HENT:   Head: Normocephalic and atraumatic.   Nose: Nose normal.   Mouth/Throat: Oropharynx is clear and moist.   Eyes: Pupils are equal, round, and reactive to light. Conjunctivae and EOM are normal. Right " eye exhibits no discharge. Left eye exhibits no discharge.   Neck: No tracheal deviation present. No thyromegaly present.   Cardiovascular: Normal rate, regular rhythm, normal heart sounds and intact distal pulses.   No murmur heard.  Pulmonary/Chest: Effort normal and breath sounds normal. No respiratory distress. She has no wheezes. She has no rales. She exhibits no tenderness. Right breast exhibits no inverted nipple, no mass, no nipple discharge, no skin change and no tenderness. Left breast exhibits no inverted nipple, no mass, no nipple discharge, no skin change and no tenderness.   Abdominal: Soft. Bowel sounds are normal. She exhibits no distension and no mass. There is no tenderness. No hernia.   Musculoskeletal: Normal range of motion. She exhibits no deformity.   Lymphadenopathy:     She has no cervical adenopathy.   Neurological: She is alert and oriented to person, place, and time. She has normal reflexes.   Skin: Skin is warm and dry.   Psychiatric: She has a normal mood and affect. Her behavior is normal. Judgment and thought content normal.   Nursing note and vitals reviewed.    Results for orders placed or performed in visit on 08/25/20   Urine Culture - Urine, Urine, Clean Catch   Result Value Ref Range    Urine Culture No growth    Comprehensive Metabolic Panel   Result Value Ref Range    Glucose 94 65 - 99 mg/dL    BUN 17 8 - 23 mg/dL    Creatinine 0.97 0.57 - 1.00 mg/dL    Sodium 140 136 - 145 mmol/L    Potassium 4.4 3.5 - 5.2 mmol/L    Chloride 102 98 - 107 mmol/L    CO2 26.7 22.0 - 29.0 mmol/L    Calcium 9.9 8.6 - 10.5 mg/dL    Total Protein 7.1 6.0 - 8.5 g/dL    Albumin 4.30 3.50 - 5.20 g/dL    ALT (SGPT) 18 1 - 33 U/L    AST (SGOT) 21 1 - 32 U/L    Alkaline Phosphatase 87 39 - 117 U/L    Total Bilirubin 0.6 0.0 - 1.2 mg/dL    eGFR Non African Amer 56 (L) >60 mL/min/1.73    Globulin 2.8 gm/dL    A/G Ratio 1.5 g/dL    BUN/Creatinine Ratio 17.5 7.0 - 25.0    Anion Gap 11.3 5.0 - 15.0 mmol/L    Lipid Panel   Result Value Ref Range    Total Cholesterol 155 0 - 200 mg/dL    Triglycerides 97 0 - 150 mg/dL    HDL Cholesterol 47 40 - 60 mg/dL    LDL Cholesterol  89 0 - 100 mg/dL    VLDL Cholesterol 19.4 5 - 40 mg/dL    LDL/HDL Ratio 1.89    Urinalysis With Culture If Indicated - Urine, Clean Catch   Result Value Ref Range    Color, UA Yellow Yellow, Straw    Appearance, UA Clear Clear    pH, UA 7.0 5.0 - 8.0    Specific Gravity, UA 1.013 1.005 - 1.030    Glucose, UA Negative Negative    Ketones, UA Negative Negative    Bilirubin, UA Negative Negative    Blood, UA Negative Negative    Protein, UA Negative Negative    Leuk Esterase, UA Large (3+) (A) Negative    Nitrite, UA Negative Negative    Urobilinogen, UA 0.2 E.U./dL 0.2 - 1.0 E.U./dL   CBC Auto Differential   Result Value Ref Range    WBC 5.96 3.40 - 10.80 10*3/mm3    RBC 4.81 3.77 - 5.28 10*6/mm3    Hemoglobin 15.1 12.0 - 15.9 g/dL    Hematocrit 45.6 34.0 - 46.6 %    MCV 94.8 79.0 - 97.0 fL    MCH 31.4 26.6 - 33.0 pg    MCHC 33.1 31.5 - 35.7 g/dL    RDW 12.4 12.3 - 15.4 %    RDW-SD 43.3 37.0 - 54.0 fl    MPV 10.3 6.0 - 12.0 fL    Platelets 412 140 - 450 10*3/mm3    Neutrophil % 34.9 (L) 42.7 - 76.0 %    Lymphocyte % 52.7 (H) 19.6 - 45.3 %    Monocyte % 9.4 5.0 - 12.0 %    Eosinophil % 1.8 0.3 - 6.2 %    Basophil % 1.0 0.0 - 1.5 %    Immature Grans % 0.2 0.0 - 0.5 %    Neutrophils, Absolute 2.08 1.70 - 7.00 10*3/mm3    Lymphocytes, Absolute 3.14 (H) 0.70 - 3.10 10*3/mm3    Monocytes, Absolute 0.56 0.10 - 0.90 10*3/mm3    Eosinophils, Absolute 0.11 0.00 - 0.40 10*3/mm3    Basophils, Absolute 0.06 0.00 - 0.20 10*3/mm3    Immature Grans, Absolute 0.01 0.00 - 0.05 10*3/mm3    nRBC 0.0 0.0 - 0.2 /100 WBC   Urinalysis, Microscopic Only - Urine, Clean Catch   Result Value Ref Range    RBC, UA 0-2 None Seen, 0-2 /HPF    WBC, UA 21-30 (A) None Seen, 0-2 /HPF    Bacteria, UA None Seen None Seen /HPF    Squamous Epithelial Cells, UA 0-2 None Seen, 0-2 /HPF     Hyaline Casts, UA 0-2 None Seen /LPF    Methodology Automated Microscopy       Notes brought forward are reviewed and updated if indicated.     Assessment/Plan   Problem List Items Addressed This Visit        Cardiovascular and Mediastinum    Essential hypertension (Chronic)       Musculoskeletal and Integument    Degenerative joint disease involving multiple joints on both sides of body (Chronic)    Relevant Medications    HYDROcodone-acetaminophen (NORCO) 5-325 MG per tablet    Degenerative disc disease, lumbar (Chronic)    Relevant Medications    HYDROcodone-acetaminophen (NORCO) 5-325 MG per tablet    gabapentin (NEURONTIN) 300 MG capsule    Cervical disc disease (Chronic)    Relevant Medications    HYDROcodone-acetaminophen (NORCO) 5-325 MG per tablet    gabapentin (NEURONTIN) 300 MG capsule      Other Visit Diagnoses     Medicare annual wellness visit, subsequent    -  Primary          Will notify regarding results. Continue current treatment. Ricki reviewed and appropriate. Not recommended to drive or operate heavy equipment while taking potentially sedating meds.  Patient understands the risks associated with this controlled medication, including tolerance and addiction. They also agree to obtain this medication only from me, and not from a another provider, unless that provider is covering for me in my absence. They also agree to be compliant in dosing, and not self adjust the dose of medication.  A signed controlled substance agreement is on file, and they have received a controlled substance education sheet at this or a previous visit. They have also signed a consent for treatment with a controlled substance as per [facility] policy.      New Medications Ordered This Visit   Medications   • HYDROcodone-acetaminophen (NORCO) 5-325 MG per tablet     Sig: Take 1 tablet by mouth Every 6 (Six) Hours As Needed for Moderate Pain (4-6).     Dispense:  120 tablet     Refill:  0   • gabapentin (NEURONTIN) 300  MG capsule     Si cap qam and 2 caps qhs     Dispense:  270 capsule     Refill:  0     Return in about 2 months (around 10/27/2020) for Recheck.

## 2020-08-28 ENCOUNTER — TELEPHONE (OUTPATIENT)
Dept: FAMILY MEDICINE CLINIC | Facility: CLINIC | Age: 75
End: 2020-08-28

## 2020-08-28 VITALS
DIASTOLIC BLOOD PRESSURE: 75 MMHG | RESPIRATION RATE: 18 BRPM | SYSTOLIC BLOOD PRESSURE: 154 MMHG | OXYGEN SATURATION: 97 % | BODY MASS INDEX: 31.12 KG/M2 | WEIGHT: 198.3 LBS | TEMPERATURE: 97.8 F | HEART RATE: 70 BPM | HEIGHT: 67 IN

## 2020-08-28 RX ADMIN — BACITRACIN 1 APPLICATION: 500 OINTMENT TOPICAL at 01:05

## 2020-08-28 RX ADMIN — HYDROCODONE BITARTRATE AND ACETAMINOPHEN 1 TABLET: 5; 325 TABLET ORAL at 00:04

## 2020-08-28 RX ADMIN — LIDOCAINE HYDROCHLORIDE,EPINEPHRINE BITARTRATE 10 ML: 10; .01 INJECTION, SOLUTION INFILTRATION; PERINEURAL at 00:06

## 2020-08-28 NOTE — TELEPHONE ENCOUNTER
Letter sent, Mammogram Normal    ----- Message from Emi Wright MD sent at 8/28/2020  1:04 PM CDT -----  Card normal

## 2020-09-02 ENCOUNTER — OFFICE VISIT (OUTPATIENT)
Dept: FAMILY MEDICINE CLINIC | Facility: CLINIC | Age: 75
End: 2020-09-02

## 2020-09-02 VITALS
HEART RATE: 64 BPM | WEIGHT: 156 LBS | SYSTOLIC BLOOD PRESSURE: 130 MMHG | HEIGHT: 67 IN | BODY MASS INDEX: 24.48 KG/M2 | OXYGEN SATURATION: 98 % | DIASTOLIC BLOOD PRESSURE: 72 MMHG

## 2020-09-02 DIAGNOSIS — S09.90XD INJURY OF HEAD, SUBSEQUENT ENCOUNTER: ICD-10-CM

## 2020-09-02 DIAGNOSIS — S01.81XD FACIAL LACERATION, SUBSEQUENT ENCOUNTER: Primary | ICD-10-CM
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
## 2020-09-02 PROCEDURE — 99213 OFFICE O/P EST LOW 20 MIN: CPT | Performed by: GENERAL PRACTICE

## 2020-09-02 NOTE — PROGRESS NOTES
Subjective   Jaleesa Nam is a 75 y.o. female.   Chief Complaint   Patient presents with   • Suture / Staple Removal     Jaleesa fell going up stairs 6 days ago, suffered a facial laceration. Thinks she may have had brief loss of consciousness. Denies headache or vision trouble. Was seen in ER for sutures. CT head and neck showed no acute problem.     Copied from hospital record:   75-year-old female presents to the emergency department with a forehead laceration after an accidental fall.  She reports she was at a friend's house in the dark walking up the stairs when she looked up to look at the weather and she slipped and tripped and fell forward and hit her head on the stair.  Denies loss of consciousness.  Reports having some pain all over but denies any focal pain.  Patient is not on blood thinners.  Denies any visual disturbance.  Complains of headache.    Suture / Staple Removal   The sutures were placed 3 to 6 days ago. She tried nothing since the wound repair. There has been no drainage from the wound. There is no redness present. There is no swelling present. There is no pain present.      The following portions of the patient's history were reviewed and updated as appropriate: allergies, current medications, past social history and problem list.    Outpatient Medications Prior to Visit   Medication Sig Dispense Refill   • amitriptyline (ELAVIL) 25 MG tablet Take 1 tablet by mouth Every Night. 90 tablet 3   • amLODIPine (NORVASC) 10 MG tablet Take 1 tablet by mouth Daily. 90 tablet 3   • calcium carbonate-vitamin d (CALTRATE 600+D) 600-400 MG-UNIT per tablet Take 1 tablet by mouth daily.     • gabapentin (NEURONTIN) 300 MG capsule 1 cap qam and 2 caps qhs 270 capsule 0   • HYDROcodone-acetaminophen (NORCO) 5-325 MG per tablet Take 1 tablet by mouth Every 6 (Six) Hours As Needed for Moderate Pain (4-6). 120 tablet 0   • lisinopril (PRINIVIL,ZESTRIL) 20 MG tablet TAKE 1 TABLET BY MOUTH EVERY DAY 90  "tablet 2   • metroNIDAZOLE (METROCREAM) 0.75 % cream Apply  topically to the appropriate area as directed 2 (Two) Times a Day. 45 g 2   • minocycline (MINOCIN,DYNACIN) 100 MG capsule Take 100 mg by mouth Daily.     • mupirocin (Bactroban) 2 % cream Apply  topically to the appropriate area as directed 3 (Three) Times a Day As Needed (skin lesions). 30 g 0     Facility-Administered Medications Prior to Visit   Medication Dose Route Frequency Provider Last Rate Last Dose   • triamcinolone acetonide (KENALOG-40) injection 60 mg  60 mg Intramuscular Once Emi Wright MD           Review of Systems   Constitutional: Negative.  Negative for chills, fatigue, fever and unexpected weight change.   HENT: Negative.  Negative for congestion, ear pain, hearing loss, nosebleeds, rhinorrhea, sneezing, sore throat and tinnitus.    Eyes: Negative.  Negative for discharge.   Respiratory: Negative.  Negative for cough, shortness of breath and wheezing.    Cardiovascular: Negative.  Negative for chest pain and palpitations.   Gastrointestinal: Negative.  Negative for abdominal pain, constipation, diarrhea, nausea and vomiting.   Endocrine: Negative.    Genitourinary: Negative.  Negative for dysuria, frequency and urgency.   Musculoskeletal: Negative.  Negative for arthralgias, back pain, joint swelling, myalgias and neck pain.   Skin: Negative.  Negative for rash.   Allergic/Immunologic: Negative.    Neurological: Negative.  Negative for dizziness, weakness, numbness and headaches.   Hematological: Negative.  Negative for adenopathy.   Psychiatric/Behavioral: Negative.  Negative for dysphoric mood and sleep disturbance. The patient is not nervous/anxious.        Objective   Visit Vitals  /72 (BP Location: Left arm)   Pulse 64   Ht 170.2 cm (67\")   Wt 70.8 kg (156 lb)   SpO2 98%   BMI 24.43 kg/m²       Physical Exam   Constitutional: She is oriented to person, place, and time. She appears well-developed and well-nourished. No " distress.   HENT:   Head: Normocephalic and atraumatic.       Nose: Nose normal.   Mouth/Throat: Oropharynx is clear and moist.   Eyes: Pupils are equal, round, and reactive to light. Conjunctivae and EOM are normal. Right eye exhibits no discharge. Left eye exhibits no discharge.   Neck: No thyromegaly present.   Cardiovascular: Normal rate, regular rhythm, normal heart sounds and intact distal pulses.   Pulmonary/Chest: Effort normal and breath sounds normal.   Lymphadenopathy:     She has no cervical adenopathy.   Neurological: She is alert and oriented to person, place, and time.   Skin: Skin is warm and dry.   Psychiatric: She has a normal mood and affect.   Nursing note and vitals reviewed.    Suture Removal  Date/Time: 9/2/2020 11:00 AM  Performed by: Emi Wright MD  Authorized by: Emi Wright MD   Body area: head/neck  Location details: forehead  Wound Appearance: clean  Sutures Removed: 11  Patient tolerance: Patient tolerated the procedure well with no immediate complications        Notes brought forward are reviewed and updated if indicated.     Assessment/Plan   Problem List Items Addressed This Visit     None      Visit Diagnoses     Facial laceration, subsequent encounter    -  Primary    sutures removed    Relevant Orders    Suture Removal    Fall, subsequent encounter        Injury of head, subsequent encounter             Advised on wound care.  Advised what to watch for for late complications of head injury.    No orders of the defined types were placed in this encounter.    Return if symptoms worsen or fail to improve, for Next scheduled follow up.

## 2020-09-21 PROCEDURE — U0002 COVID-19 LAB TEST NON-CDC: HCPCS | Performed by: NURSE PRACTITIONER

## 2020-09-26 ENCOUNTER — FLU SHOT (OUTPATIENT)
Dept: FAMILY MEDICINE CLINIC | Facility: CLINIC | Age: 75
End: 2020-09-26

## 2020-09-26 DIAGNOSIS — Z23 ENCOUNTER FOR VACCINATION: Primary | ICD-10-CM

## 2020-09-26 PROCEDURE — G0008 ADMIN INFLUENZA VIRUS VAC: HCPCS | Performed by: GENERAL PRACTICE

## 2020-09-26 PROCEDURE — 90694 VACC AIIV4 NO PRSRV 0.5ML IM: CPT | Performed by: GENERAL PRACTICE

## 2020-10-27 ENCOUNTER — OFFICE VISIT (OUTPATIENT)
Dept: FAMILY MEDICINE CLINIC | Facility: CLINIC | Age: 75
End: 2020-10-27

## 2020-10-27 VITALS
OXYGEN SATURATION: 95 % | HEART RATE: 60 BPM | BODY MASS INDEX: 25.43 KG/M2 | SYSTOLIC BLOOD PRESSURE: 120 MMHG | HEIGHT: 67 IN | DIASTOLIC BLOOD PRESSURE: 70 MMHG | WEIGHT: 162 LBS

## 2020-10-27 DIAGNOSIS — M50.90 CERVICAL DISC DISEASE: Chronic | ICD-10-CM

## 2020-10-27 DIAGNOSIS — M15.9 DEGENERATIVE JOINT DISEASE INVOLVING MULTIPLE JOINTS ON BOTH SIDES OF BODY: Chronic | ICD-10-CM

## 2020-10-27 DIAGNOSIS — M51.36 DEGENERATIVE DISC DISEASE, LUMBAR: Chronic | ICD-10-CM

## 2020-10-27 PROCEDURE — 99213 OFFICE O/P EST LOW 20 MIN: CPT | Performed by: GENERAL PRACTICE

## 2020-10-27 RX ORDER — HYDROCODONE BITARTRATE AND ACETAMINOPHEN 5; 325 MG/1; MG/1
1 TABLET ORAL EVERY 6 HOURS PRN
Qty: 120 TABLET | Refills: 0 | Status: SHIPPED | OUTPATIENT
Start: 2020-10-27 | End: 2020-12-16 | Stop reason: SDUPTHER

## 2020-10-27 RX ORDER — TIZANIDINE 4 MG/1
4 TABLET ORAL EVERY 8 HOURS PRN
Qty: 30 TABLET | Refills: 2 | Status: SHIPPED | OUTPATIENT
Start: 2020-10-27 | End: 2020-12-16

## 2020-12-01 DIAGNOSIS — M51.36 DEGENERATIVE DISC DISEASE, LUMBAR: Chronic | ICD-10-CM

## 2020-12-01 DIAGNOSIS — M50.90 CERVICAL DISC DISEASE: Chronic | ICD-10-CM

## 2020-12-01 RX ORDER — GABAPENTIN 300 MG/1
CAPSULE ORAL
Qty: 270 CAPSULE | Refills: 0 | Status: SHIPPED | OUTPATIENT
Start: 2020-12-01 | End: 2020-12-16 | Stop reason: SDUPTHER

## 2020-12-16 ENCOUNTER — OFFICE VISIT (OUTPATIENT)
Dept: FAMILY MEDICINE CLINIC | Facility: CLINIC | Age: 75
End: 2020-12-16

## 2020-12-16 VITALS
BODY MASS INDEX: 25.43 KG/M2 | WEIGHT: 162 LBS | HEIGHT: 67 IN | DIASTOLIC BLOOD PRESSURE: 70 MMHG | SYSTOLIC BLOOD PRESSURE: 130 MMHG | HEART RATE: 69 BPM | OXYGEN SATURATION: 98 %

## 2020-12-16 DIAGNOSIS — M15.9 DEGENERATIVE JOINT DISEASE INVOLVING MULTIPLE JOINTS ON BOTH SIDES OF BODY: Chronic | ICD-10-CM

## 2020-12-16 DIAGNOSIS — M50.90 CERVICAL DISC DISEASE: Chronic | ICD-10-CM

## 2020-12-16 DIAGNOSIS — I10 ESSENTIAL HYPERTENSION: Primary | Chronic | ICD-10-CM

## 2020-12-16 DIAGNOSIS — M51.36 DEGENERATIVE DISC DISEASE, LUMBAR: Chronic | ICD-10-CM

## 2020-12-16 PROCEDURE — 99214 OFFICE O/P EST MOD 30 MIN: CPT | Performed by: GENERAL PRACTICE

## 2020-12-16 RX ORDER — HYDROCODONE BITARTRATE AND ACETAMINOPHEN 5; 325 MG/1; MG/1
1 TABLET ORAL EVERY 6 HOURS PRN
Qty: 120 TABLET | Refills: 0 | Status: SHIPPED | OUTPATIENT
Start: 2020-12-16 | End: 2021-02-19 | Stop reason: SDUPTHER

## 2020-12-16 RX ORDER — AMITRIPTYLINE HYDROCHLORIDE 25 MG/1
25 TABLET, FILM COATED ORAL NIGHTLY
Qty: 90 TABLET | Refills: 3 | Status: SHIPPED | OUTPATIENT
Start: 2020-12-16 | End: 2021-09-30 | Stop reason: SDUPTHER

## 2020-12-16 RX ORDER — AMLODIPINE BESYLATE 10 MG/1
10 TABLET ORAL DAILY
Qty: 90 TABLET | Refills: 3 | Status: SHIPPED | OUTPATIENT
Start: 2020-12-16 | End: 2021-04-09

## 2020-12-16 RX ORDER — GABAPENTIN 300 MG/1
CAPSULE ORAL
Qty: 270 CAPSULE | Refills: 0 | Status: SHIPPED | OUTPATIENT
Start: 2020-12-16 | End: 2021-02-19 | Stop reason: SDUPTHER

## 2020-12-16 NOTE — PROGRESS NOTES
Subjective   Jaleesa Nam is a 75 y.o. female.   Chief Complaint   Patient presents with   • Hypertension   • Med Refill   • Back Pain   • Osteoarthritis   • Neck Pain     For review and evaluation of management of chronic medical problems. Pain is not controlled with medications.  Has an appointment with pain management.  No side effects.    Neck Pain   This is a chronic problem. The current episode started more than 1 year ago. The problem occurs constantly. The problem has been gradually worsening. The pain is associated with nothing. The pain is present in the midline, right side and left side. The pain is at a severity of 9/10. The pain is severe. The symptoms are aggravated by position and twisting. The pain is same all the time. Stiffness is present in the morning. Pertinent negatives include no chest pain, fever, headaches, numbness, paresis, trouble swallowing, weakness or weight loss. She has tried oral narcotics for the symptoms. The treatment provided moderate relief.   Back Pain  This is a new problem. The current episode started more than 1 month ago. The problem occurs constantly. The problem has been gradually worsening since onset. The pain is present in the gluteal and lumbar spine. The quality of the pain is described as aching. The pain does not radiate. The pain is at a severity of 9/10. The pain is moderate. The pain is the same all the time. The symptoms are aggravated by bending and sitting. Stiffness is present all day. Pertinent negatives include no abdominal pain, bladder incontinence, bowel incontinence, chest pain, dysuria, fever, headaches, numbness, paresis, paresthesias, pelvic pain, perianal numbness, weakness or weight loss. Risk factors include history of cancer. She has tried analgesics for the symptoms. The treatment provided significant relief.   Arthritis  Presents for follow-up visit. She complains of pain, stiffness and joint warmth. She reports no joint swelling. The  symptoms have been stable. Affected locations include the right shoulder, left shoulder, left MCP, right MCP, left knee, right knee, right hip, left hip and neck. Her pain is at a severity of 9/10. Associated symptoms include pain at night. Pertinent negatives include no diarrhea, dysuria, fatigue, fever, rash, Raynaud's syndrome or weight loss. Compliance with total regimen is %. Compliance with medications is %.   Hypertension  This is a chronic problem. The current episode started more than 1 year ago. The problem is unchanged. The problem is controlled. Associated symptoms include neck pain. Pertinent negatives include no chest pain, headaches, palpitations or shortness of breath. Current antihypertension treatment includes ACE inhibitors and calcium channel blockers. The current treatment provides significant improvement. There are no compliance problems.       The following portions of the patient's history were reviewed and updated as appropriate: allergies, current medications, past social history and problem list.    Outpatient Medications Prior to Visit   Medication Sig Dispense Refill   • calcium carbonate-vitamin d (CALTRATE 600+D) 600-400 MG-UNIT per tablet Take 1 tablet by mouth daily.     • lisinopril (PRINIVIL,ZESTRIL) 20 MG tablet TAKE 1 TABLET BY MOUTH EVERY DAY 90 tablet 2   • metroNIDAZOLE (METROCREAM) 0.75 % cream Apply  topically to the appropriate area as directed 2 (Two) Times a Day. 45 g 2   • amitriptyline (ELAVIL) 25 MG tablet Take 1 tablet by mouth Every Night. 90 tablet 3   • amLODIPine (NORVASC) 10 MG tablet Take 1 tablet by mouth Daily. 90 tablet 3   • gabapentin (NEURONTIN) 300 MG capsule TAKE ONE CAPSULE BY MOUTH EVERY MORNING AND take TWO capsules BY MOUTH AT BEDTIME 270 capsule 0   • HYDROcodone-acetaminophen (NORCO) 5-325 MG per tablet Take 1 tablet by mouth Every 6 (Six) Hours As Needed for Moderate Pain (4-6). 120 tablet 0   • tiZANidine (ZANAFLEX) 4 MG tablet Take 1  "tablet by mouth Every 8 (Eight) Hours As Needed for Muscle Spasms. 30 tablet 2     Facility-Administered Medications Prior to Visit   Medication Dose Route Frequency Provider Last Rate Last Admin   • triamcinolone acetonide (KENALOG-40) injection 60 mg  60 mg Intramuscular Once Emi Wirght MD           Review of Systems   Constitutional: Negative.  Negative for chills, fatigue, fever, unexpected weight change and weight loss.   HENT: Negative.  Negative for congestion, ear pain, hearing loss, nosebleeds, rhinorrhea, sneezing, sore throat, tinnitus and trouble swallowing.    Eyes: Negative.  Negative for discharge.   Respiratory: Negative.  Negative for cough, shortness of breath and wheezing.    Cardiovascular: Negative.  Negative for chest pain and palpitations.   Gastrointestinal: Negative.  Negative for abdominal pain, bowel incontinence, constipation, diarrhea, nausea and vomiting.   Endocrine: Negative.    Genitourinary: Negative.  Negative for bladder incontinence, dysuria, frequency, pelvic pain and urgency.   Musculoskeletal: Positive for arthritis, back pain, neck pain and stiffness. Negative for arthralgias, joint swelling and myalgias.   Skin: Negative.  Negative for rash.   Allergic/Immunologic: Negative.    Neurological: Negative.  Negative for dizziness, weakness, numbness, headaches and paresthesias.   Hematological: Negative.  Negative for adenopathy.   Psychiatric/Behavioral: Negative.  Negative for dysphoric mood and sleep disturbance. The patient is not nervous/anxious.        Objective   Visit Vitals  /70   Pulse 69   Ht 170.2 cm (67\")   Wt 73.5 kg (162 lb)   SpO2 98%   BMI 25.37 kg/m²     Physical Exam  Vitals signs and nursing note reviewed.   Constitutional:       General: She is not in acute distress.     Appearance: She is well-developed.   HENT:      Head: Normocephalic and atraumatic.      Nose: Nose normal.   Eyes:      General:         Right eye: No discharge.         Left " eye: No discharge.      Conjunctiva/sclera: Conjunctivae normal.      Pupils: Pupils are equal, round, and reactive to light.   Neck:      Musculoskeletal: Decreased range of motion. Spinous process tenderness and muscular tenderness present.      Thyroid: No thyromegaly.   Cardiovascular:      Rate and Rhythm: Normal rate and regular rhythm.      Heart sounds: Normal heart sounds.   Pulmonary:      Effort: Pulmonary effort is normal.      Breath sounds: Normal breath sounds.   Musculoskeletal:      Lumbar back: She exhibits decreased range of motion and tenderness.   Lymphadenopathy:      Cervical: No cervical adenopathy.   Skin:     General: Skin is warm and dry.   Neurological:      Mental Status: She is alert and oriented to person, place, and time.       Notes brought forward are reviewed and updated if indicated.     Assessment/Plan   Problems Addressed this Visit        Cardiovascular and Mediastinum    Essential hypertension - Primary (Chronic)    Relevant Medications    amLODIPine (NORVASC) 10 MG tablet       Musculoskeletal and Integument    Degenerative joint disease involving multiple joints on both sides of body (Chronic)    Relevant Medications    HYDROcodone-acetaminophen (NORCO) 5-325 MG per tablet    amitriptyline (ELAVIL) 25 MG tablet    Degenerative disc disease, lumbar (Chronic)    Relevant Medications    gabapentin (NEURONTIN) 300 MG capsule    HYDROcodone-acetaminophen (NORCO) 5-325 MG per tablet    amitriptyline (ELAVIL) 25 MG tablet    Cervical disc disease (Chronic)    Relevant Medications    gabapentin (NEURONTIN) 300 MG capsule    HYDROcodone-acetaminophen (NORCO) 5-325 MG per tablet    amitriptyline (ELAVIL) 25 MG tablet      Diagnoses       Codes Comments    Essential hypertension    -  Primary ICD-10-CM: I10  ICD-9-CM: 401.9     Degenerative disc disease, lumbar     ICD-10-CM: M51.36  ICD-9-CM: 722.52     Cervical disc disease     ICD-10-CM: M50.90  ICD-9-CM: 722.91     Degenerative  joint disease involving multiple joints on both sides of body     ICD-10-CM: M15.9  ICD-9-CM: 715.89          Continue current treatment. Ricki reviewed and appropriate. Not recommended to drive or operate heavy equipment while taking potentially sedating meds.  Patient understands the risks associated with this controlled medication, including tolerance and addiction. They also agree to obtain this medication only from me, and not from a another provider, unless that provider is covering for me in my absence. They also agree to be compliant in dosing, and not self adjust the dose of medication.  A signed controlled substance agreement is on file, and they have received a controlled substance education sheet at this or a previous visit. They have also signed a consent for treatment with a controlled substance as per [facility] policy.        New Medications Ordered This Visit   Medications   • gabapentin (NEURONTIN) 300 MG capsule     Sig: TAKE ONE CAPSULE BY MOUTH EVERY MORNING AND take TWO capsules BY MOUTH AT BEDTIME     Dispense:  270 capsule     Refill:  0   • HYDROcodone-acetaminophen (NORCO) 5-325 MG per tablet     Sig: Take 1 tablet by mouth Every 6 (Six) Hours As Needed for Moderate Pain (4-6).     Dispense:  120 tablet     Refill:  0   • amitriptyline (ELAVIL) 25 MG tablet     Sig: Take 1 tablet by mouth Every Night.     Dispense:  90 tablet     Refill:  3   • amLODIPine (NORVASC) 10 MG tablet     Sig: Take 1 tablet by mouth Daily.     Dispense:  90 tablet     Refill:  3     Return in about 8 weeks (around 2/10/2021) for Recheck.

## 2021-02-17 ENCOUNTER — APPOINTMENT (OUTPATIENT)
Dept: CT IMAGING | Facility: HOSPITAL | Age: 76
End: 2021-02-17

## 2021-02-17 ENCOUNTER — HOSPITAL ENCOUNTER (EMERGENCY)
Facility: HOSPITAL | Age: 76
Discharge: HOME | End: 2021-02-17
Attending: EMERGENCY MEDICINE | Admitting: EMERGENCY MEDICINE

## 2021-02-17 VITALS
TEMPERATURE: 97.5 F | OXYGEN SATURATION: 97 % | DIASTOLIC BLOOD PRESSURE: 75 MMHG | RESPIRATION RATE: 16 BRPM | WEIGHT: 167.3 LBS | BODY MASS INDEX: 26.26 KG/M2 | HEIGHT: 67 IN | SYSTOLIC BLOOD PRESSURE: 161 MMHG | HEART RATE: 63 BPM

## 2021-02-17 DIAGNOSIS — M54.2 NECK PAIN: ICD-10-CM

## 2021-02-17 DIAGNOSIS — R51.9 NONINTRACTABLE HEADACHE, UNSPECIFIED CHRONICITY PATTERN, UNSPECIFIED HEADACHE TYPE: Primary | ICD-10-CM

## 2021-02-17 LAB
ALBUMIN SERPL-MCNC: 4.4 G/DL (ref 3.5–5.2)
ALBUMIN/GLOB SERPL: 1.5 G/DL
ALP SERPL-CCNC: 99 U/L (ref 39–117)
ALT SERPL W P-5'-P-CCNC: 13 U/L (ref 1–33)
ANION GAP SERPL CALCULATED.3IONS-SCNC: 6 MMOL/L (ref 5–15)
AST SERPL-CCNC: 25 U/L (ref 1–32)
BASOPHILS # BLD AUTO: 0.1 10*3/MM3 (ref 0–0.2)
BASOPHILS NFR BLD AUTO: 1.9 % (ref 0–1.5)
BILIRUB SERPL-MCNC: 0.8 MG/DL (ref 0–1.2)
BUN SERPL-MCNC: 19 MG/DL (ref 8–23)
BUN/CREAT SERPL: 18.3 (ref 7–25)
CALCIUM SPEC-SCNC: 10.2 MG/DL (ref 8.6–10.5)
CHLORIDE SERPL-SCNC: 101 MMOL/L (ref 98–107)
CO2 SERPL-SCNC: 29 MMOL/L (ref 22–29)
CREAT SERPL-MCNC: 1.04 MG/DL (ref 0.57–1)
DEPRECATED RDW RBC AUTO: 41.1 FL (ref 37–54)
EOSINOPHIL # BLD AUTO: 0.19 10*3/MM3 (ref 0–0.4)
EOSINOPHIL NFR BLD AUTO: 3.5 % (ref 0.3–6.2)
ERYTHROCYTE [DISTWIDTH] IN BLOOD BY AUTOMATED COUNT: 12.1 % (ref 12.3–15.4)
GFR SERPL CREATININE-BSD FRML MDRD: 52 ML/MIN/1.73
GLOBULIN UR ELPH-MCNC: 2.9 GM/DL
GLUCOSE SERPL-MCNC: 167 MG/DL (ref 65–99)
HCT VFR BLD AUTO: 42.9 % (ref 34–46.6)
HGB BLD-MCNC: 15 G/DL (ref 12–15.9)
IMM GRANULOCYTES # BLD AUTO: 0.01 10*3/MM3 (ref 0–0.05)
IMM GRANULOCYTES NFR BLD AUTO: 0.2 % (ref 0–0.5)
LYMPHOCYTES # BLD AUTO: 1.91 10*3/MM3 (ref 0.7–3.1)
LYMPHOCYTES NFR BLD AUTO: 35.6 % (ref 19.6–45.3)
MCH RBC QN AUTO: 32.3 PG (ref 26.6–33)
MCHC RBC AUTO-ENTMCNC: 35 G/DL (ref 31.5–35.7)
MCV RBC AUTO: 92.5 FL (ref 79–97)
MONOCYTES # BLD AUTO: 0.4 10*3/MM3 (ref 0.1–0.9)
MONOCYTES NFR BLD AUTO: 7.4 % (ref 5–12)
NEUTROPHILS NFR BLD AUTO: 2.76 10*3/MM3 (ref 1.7–7)
NEUTROPHILS NFR BLD AUTO: 51.4 % (ref 42.7–76)
NRBC BLD AUTO-RTO: 0 /100 WBC (ref 0–0.2)
PLATELET # BLD AUTO: 415 10*3/MM3 (ref 140–450)
PMV BLD AUTO: 9.5 FL (ref 6–12)
POTASSIUM SERPL-SCNC: 4.5 MMOL/L (ref 3.5–5.2)
PROT SERPL-MCNC: 7.3 G/DL (ref 6–8.5)
RBC # BLD AUTO: 4.64 10*6/MM3 (ref 3.77–5.28)
SODIUM SERPL-SCNC: 136 MMOL/L (ref 136–145)
WBC # BLD AUTO: 5.37 10*3/MM3 (ref 3.4–10.8)

## 2021-02-17 PROCEDURE — 25010000002 METOCLOPRAMIDE PER 10 MG: Performed by: PHYSICIAN ASSISTANT

## 2021-02-17 PROCEDURE — 96375 TX/PRO/DX INJ NEW DRUG ADDON: CPT

## 2021-02-17 PROCEDURE — 25010000002 KETOROLAC TROMETHAMINE PER 15 MG: Performed by: PHYSICIAN ASSISTANT

## 2021-02-17 PROCEDURE — 25010000002 DIPHENHYDRAMINE PER 50 MG: Performed by: PHYSICIAN ASSISTANT

## 2021-02-17 PROCEDURE — 96374 THER/PROPH/DIAG INJ IV PUSH: CPT

## 2021-02-17 PROCEDURE — 80053 COMPREHEN METABOLIC PANEL: CPT | Performed by: PHYSICIAN ASSISTANT

## 2021-02-17 PROCEDURE — 72125 CT NECK SPINE W/O DYE: CPT

## 2021-02-17 PROCEDURE — 85025 COMPLETE CBC W/AUTO DIFF WBC: CPT | Performed by: PHYSICIAN ASSISTANT

## 2021-02-17 PROCEDURE — 70450 CT HEAD/BRAIN W/O DYE: CPT

## 2021-02-17 PROCEDURE — 99283 EMERGENCY DEPT VISIT LOW MDM: CPT

## 2021-02-17 RX ORDER — DIPHENHYDRAMINE HYDROCHLORIDE 50 MG/ML
25 INJECTION INTRAMUSCULAR; INTRAVENOUS ONCE
Status: COMPLETED | OUTPATIENT
Start: 2021-02-17 | End: 2021-02-17

## 2021-02-17 RX ORDER — KETOROLAC TROMETHAMINE 15 MG/ML
15 INJECTION, SOLUTION INTRAMUSCULAR; INTRAVENOUS EVERY 6 HOURS PRN
Status: DISCONTINUED | OUTPATIENT
Start: 2021-02-17 | End: 2021-02-17 | Stop reason: HOSPADM

## 2021-02-17 RX ORDER — METOCLOPRAMIDE HYDROCHLORIDE 5 MG/ML
10 INJECTION INTRAMUSCULAR; INTRAVENOUS ONCE
Status: COMPLETED | OUTPATIENT
Start: 2021-02-17 | End: 2021-02-17

## 2021-02-17 RX ORDER — SODIUM CHLORIDE 0.9 % (FLUSH) 0.9 %
10 SYRINGE (ML) INJECTION AS NEEDED
Status: DISCONTINUED | OUTPATIENT
Start: 2021-02-17 | End: 2021-02-17 | Stop reason: HOSPADM

## 2021-02-17 RX ADMIN — METOCLOPRAMIDE 10 MG: 5 INJECTION, SOLUTION INTRAMUSCULAR; INTRAVENOUS at 14:05

## 2021-02-17 RX ADMIN — DIPHENHYDRAMINE HYDROCHLORIDE 25 MG: 50 INJECTION INTRAMUSCULAR; INTRAVENOUS at 14:05

## 2021-02-17 RX ADMIN — KETOROLAC TROMETHAMINE 15 MG: 15 INJECTION, SOLUTION INTRAMUSCULAR; INTRAVENOUS at 14:04

## 2021-02-17 NOTE — ED PROVIDER NOTES
Subjective   Patient presents to emergency department for headache, neck pain, right-sided extremity aching.  States is been happening for about 6 months but has gotten much worse over the past 2 weeks.  She describes headache as pain shooting from her neck into the back of her head.  States her neck also feels tight and increased pain when she turns her head either direction.  She states neck pain is worse in the left than the right.  Denies weakness/numbness/tingling but endorses aching to right upper and lower extremity.  She has a surgical history of anterior/posterior cervical discectomy and fusion.  She takes multiple medications for chronic pain but states these are not helping with these symptoms.      History provided by:  Patient   used: No    Headache  Pain location:  Occipital  Quality:  Sharp  Radiates to:  Does not radiate  Duration:  2 weeks (Acute on chronic)  Timing:  Constant  Progression:  Worsening  Chronicity:  Chronic  Similar to prior headaches: no    Relieved by:  Nothing  Worsened by:  Neck movement  Associated symptoms: myalgias    Associated symptoms: no abdominal pain, no cough, no dizziness, no fever, no nausea, no numbness, no sore throat, no vomiting and no weakness        Review of Systems   Constitutional: Negative for chills and fever.   HENT: Negative for sore throat and trouble swallowing.    Eyes: Negative for visual disturbance.   Respiratory: Negative for cough, shortness of breath and wheezing.    Cardiovascular: Negative for chest pain.   Gastrointestinal: Negative for abdominal pain, nausea and vomiting.   Genitourinary: Negative for dysuria and flank pain.   Musculoskeletal: Positive for arthralgias and myalgias.   Skin: Negative for color change.   Neurological: Positive for headaches. Negative for dizziness, syncope, facial asymmetry, speech difficulty, weakness and numbness.   Psychiatric/Behavioral: Negative for confusion.       Past Medical History:    Diagnosis Date   • Abrasion     Abrasion and/or friction burn      • Acute bronchitis    • Acute laryngitis     probably viral   • Acute maxillary sinusitis    • Acute pharyngitis    • Acute serous otitis media    • Acute sinusitis    • Acute urinary tract infection    • Ankle edema    • Aortic valve regurgitation    • Astigmatism    • Breast cancer (CMS/HCC)    • Bunion    • Chest pain    • Chest pain    • Contusion of foot    • Cough    • Diastolic dysfunction    • Dyspnea    • Encounter for general adult medical examination without abnormal findings    • Encounter for routine adult health examination    • Essential hypertension    • Gastroesophageal reflux disease    • Hammer toe    • Heart murmur    • Hip pain    • Hyperlipidemia    • Low back pain    • Malaise and fatigue    • Mammogram abnormal    • Menopause    • Myopia    • Need for immunization against influenza    • Need for prophylactic vaccination against Streptococcus pneumoniae (pneumococcus)    • Nuclear cataract    • Osteoarthritis    • Palpitations    • Plantar fasciitis    • Posterior subcapsular polar senile cataract    • Primary fibromyalgia syndrome    • Sciatica    • Screening for osteoporosis    • Screening mammogram, encounter for    • Shoulder pain    • Tear film insufficiency    • Trochanteric bursitis    • Upper respiratory infection    • Urinary tract infectious disease    • Vitamin D deficiency        No Known Allergies    Past Surgical History:   Procedure Laterality Date   • BREAST BIOPSY  02/10/2011    Stereotactic breast biopsy (3)    Sterotactic location guide localization of the abnormality with wire placement. Left lumpectomy. Faxigram.    • BREAST LUMPECTOMY     • BUNIONECTOMY Left 9/11/2019    Procedure: Reverdin bunionectomy, second hammertoe correction,  weil osteotomy                     c-arm;  Surgeon: Williams Perry DPM;  Location: [facility];  Service: Podiatry   • CARDIAC CATHETERIZATION  10/01/2010    Cardiac cath  56415 (1)    Minimal plaquing in the first diagonal branch with evidence of good POORNIMA 3 flow. Preserved left ventricular systolic function with ejection fraction of 55%.    • CERVICAL SPINE SURGERY  06/09/1993    Cervical spine disk surgery (1)    C5-C6 anterior cervical discectomy and fusion with iliac bone graft.    • ENDOSCOPY  10/07/2015    Colon endoscopy 88820 (2)    negative cologard    • HIP ARTHROPLASTY  09/12/2011    Anesth, hip joint procedure (1)    Right total hip arthroplasty with adductor tenotomy. Osteoarthritis of the right hip.    • INJECTION OF MEDICATION  10/01/2014    Kenalog (4)      • INJECTION OF MEDICATION  09/03/2012    Rocephin (1)      • KNEE SURGERY  02/03/2016    Knee Surgery (1)    Left total knee arthroplasty.    • NECK SURGERY      Neck spinal fusion (1)      • NOSE SURGERY  11/18/1978    Treat nasal (nose) fracture (1) Nasal fracture reduction with splint fixation.    • OTHER SURGICAL HISTORY  06/22/2010    Correction of bunion (1)    Hallux valgus deformity of right foot. Base wedge bunionectomy of right foot.    • PAP SMEAR  12/10/2008   • SHOULDER SURGERY  06/17/2013    Shoulder arthroscopy/surgery (2)    Arthroscopy of the right shoulder with rotator cuff repair.    • TOTAL ABDOMINAL HYSTERECTOMY WITH SALPINGO OOPHORECTOMY     • TOTAL KNEE ARTHROPLASTY Left 02/03/2016       Family History   Problem Relation Age of Onset   • Coronary artery disease Other    • Hypertension Other    • Cancer Other         lung, stomach, bladder   • Arthritis Mother    • Hypertension Mother    • Heart disease Mother    • Hyperlipidemia Mother    • Cancer Maternal Grandmother    • Colon cancer Maternal Grandmother    • Cancer Maternal Grandfather        Social History     Socioeconomic History   • Marital status:      Spouse name: Not on file   • Number of children: Not on file   • Years of education: Not on file   • Highest education level: Not on file   Tobacco Use   • Smoking status: Never  "Smoker   • Smokeless tobacco: Never Used   Substance and Sexual Activity   • Alcohol use: No     Frequency: Never   • Drug use: No   • Sexual activity: Defer           Objective      /75 (BP Location: Right arm, Patient Position: Lying)   Pulse 63   Temp 97.5 °F (36.4 °C) (Infrared)   Resp 16   Ht 170.2 cm (67\")   Wt 75.9 kg (167 lb 4.8 oz)   SpO2 97%   BMI 26.20 kg/m²     Physical Exam  Vitals signs and nursing note reviewed.   Constitutional:       Appearance: Normal appearance.   HENT:      Head: Normocephalic and atraumatic.      Mouth/Throat:      Mouth: Mucous membranes are moist.   Eyes:      Conjunctiva/sclera: Conjunctivae normal.   Neck:      Musculoskeletal: No neck rigidity or muscular tenderness.      Comments: ROM limited by pain  Cardiovascular:      Rate and Rhythm: Normal rate and regular rhythm.      Pulses: Normal pulses.      Heart sounds: Normal heart sounds.   Pulmonary:      Effort: Pulmonary effort is normal. No respiratory distress.      Breath sounds: Normal breath sounds. No wheezing.   Musculoskeletal:      Comments: Neck nontender, pain increased with rotation to either side   Lymphadenopathy:      Cervical: No cervical adenopathy.   Skin:     General: Skin is warm.      Capillary Refill: Capillary refill takes less than 2 seconds.   Neurological:      General: No focal deficit present.      Mental Status: She is alert.   Psychiatric:         Mood and Affect: Mood normal.         Behavior: Behavior normal.         Thought Content: Thought content normal.         Procedures           ED Course      Results for orders placed or performed during the hospital encounter of 02/17/21   Comprehensive Metabolic Panel    Specimen: Blood   Result Value Ref Range    Glucose 167 (H) 65 - 99 mg/dL    BUN 19 8 - 23 mg/dL    Creatinine 1.04 (H) 0.57 - 1.00 mg/dL    Sodium 136 136 - 145 mmol/L    Potassium 4.5 3.5 - 5.2 mmol/L    Chloride 101 98 - 107 mmol/L    CO2 29.0 22.0 - 29.0 mmol/L    " Calcium 10.2 8.6 - 10.5 mg/dL    Total Protein 7.3 6.0 - 8.5 g/dL    Albumin 4.40 3.50 - 5.20 g/dL    ALT (SGPT) 13 1 - 33 U/L    AST (SGOT) 25 1 - 32 U/L    Alkaline Phosphatase 99 39 - 117 U/L    Total Bilirubin 0.8 0.0 - 1.2 mg/dL    eGFR Non African Amer 52 (L) >60 mL/min/1.73    Globulin 2.9 gm/dL    A/G Ratio 1.5 g/dL    BUN/Creatinine Ratio 18.3 7.0 - 25.0    Anion Gap 6.0 5.0 - 15.0 mmol/L   CBC Auto Differential    Specimen: Blood   Result Value Ref Range    WBC 5.37 3.40 - 10.80 10*3/mm3    RBC 4.64 3.77 - 5.28 10*6/mm3    Hemoglobin 15.0 12.0 - 15.9 g/dL    Hematocrit 42.9 34.0 - 46.6 %    MCV 92.5 79.0 - 97.0 fL    MCH 32.3 26.6 - 33.0 pg    MCHC 35.0 31.5 - 35.7 g/dL    RDW 12.1 (L) 12.3 - 15.4 %    RDW-SD 41.1 37.0 - 54.0 fl    MPV 9.5 6.0 - 12.0 fL    Platelets 415 140 - 450 10*3/mm3    Neutrophil % 51.4 42.7 - 76.0 %    Lymphocyte % 35.6 19.6 - 45.3 %    Monocyte % 7.4 5.0 - 12.0 %    Eosinophil % 3.5 0.3 - 6.2 %    Basophil % 1.9 (H) 0.0 - 1.5 %    Immature Grans % 0.2 0.0 - 0.5 %    Neutrophils, Absolute 2.76 1.70 - 7.00 10*3/mm3    Lymphocytes, Absolute 1.91 0.70 - 3.10 10*3/mm3    Monocytes, Absolute 0.40 0.10 - 0.90 10*3/mm3    Eosinophils, Absolute 0.19 0.00 - 0.40 10*3/mm3    Basophils, Absolute 0.10 0.00 - 0.20 10*3/mm3    Immature Grans, Absolute 0.01 0.00 - 0.05 10*3/mm3    nRBC 0.0 0.0 - 0.2 /100 WBC     Ct Head Without Contrast    Result Date: 2/17/2021  Narrative: EXAM: CT HEAD WITHOUT IV CONTRAST, CT CERVICAL SPINE WITHOUT IV CONTRAST INDICATION: Headache COMPARISONS: CT head 8/27/2020 TECHNIQUE: Noncontrast CT images were obtained through the brain and cervical spine. Reformats were provided. All CT scans at this facility uses dose modulation, iterative reconstruction, and/or weight-based dosing when appropriate to achieve a radiation dose as low as reasonably achievable. FINDINGS: Head: No intracranial hemorrhage, appreciable mass, mass effect or midline shift. There is preservation  of the gray-white matter differentiation. No dense MCA or insular ribbon sign. There is minimal cerebral volume loss with ex vacuo ventricular dilatation. Confluent periventricular hypodensities are likely sequela of chronic ischemic microvascular disease. Calvarium is intact. Paranasal sinuses are unremarkable. Mastoid air cells are clear. Postsurgical changes of the lenses. Cervical Spine: No acute fracture. Dens is intact. Lateral masses are symmetric. 2 mm anterolisthesis of C2 on C3 with severe facet arthrosis. 1 mm anterolisthesis of C7 on T1. There are prominent degenerative changes about the atlantoaxial interval. There is ankylosis of the C5-C7 vertebral bodies. Prominent anterior osteophytes are seen at C4/5. Small osteophytes are seen at C7/T1. There is C4/5 disc space height loss with endplate sclerosis. No traumatic spinal canal stenosis. Facet arthrosis seen throughout the cervical spine along with associated neural foraminal narrowing, most significant at C4/5. No suspicious osseous lesion. The pre-vertebral soft tissues are within normal limits. Paraspinal soft tissues are unremarkable. 9 mm right thyroid nodule is noted, no further follow-up required by ACR criteria.        Impression: No acute intracranial abnormality. No acute cervical spine abnormality. Sequela of chronic ischemic microvascular disease. Moderate to severe spondylosis and degenerative disc disease of the cervical spine. Electronically signed by:  Kaleb Kellogg MD  2/17/2021 1:35 PM CST Workstation: [id number]    Ct Cervical Spine Without Contrast    Result Date: 2/17/2021  Narrative: EXAM: CT HEAD WITHOUT IV CONTRAST, CT CERVICAL SPINE WITHOUT IV CONTRAST INDICATION: Headache COMPARISONS: CT head 8/27/2020 TECHNIQUE: Noncontrast CT images were obtained through the brain and cervical spine. Reformats were provided. All CT scans at this facility uses dose modulation, iterative reconstruction, and/or weight-based dosing when  appropriate to achieve a radiation dose as low as reasonably achievable. FINDINGS: Head: No intracranial hemorrhage, appreciable mass, mass effect or midline shift. There is preservation of the gray-white matter differentiation. No dense MCA or insular ribbon sign. There is minimal cerebral volume loss with ex vacuo ventricular dilatation. Confluent periventricular hypodensities are likely sequela of chronic ischemic microvascular disease. Calvarium is intact. Paranasal sinuses are unremarkable. Mastoid air cells are clear. Postsurgical changes of the lenses. Cervical Spine: No acute fracture. Dens is intact. Lateral masses are symmetric. 2 mm anterolisthesis of C2 on C3 with severe facet arthrosis. 1 mm anterolisthesis of C7 on T1. There are prominent degenerative changes about the atlantoaxial interval. There is ankylosis of the C5-C7 vertebral bodies. Prominent anterior osteophytes are seen at C4/5. Small osteophytes are seen at C7/T1. There is C4/5 disc space height loss with endplate sclerosis. No traumatic spinal canal stenosis. Facet arthrosis seen throughout the cervical spine along with associated neural foraminal narrowing, most significant at C4/5. No suspicious osseous lesion. The pre-vertebral soft tissues are within normal limits. Paraspinal soft tissues are unremarkable. 9 mm right thyroid nodule is noted, no further follow-up required by ACR criteria.        Impression: No acute intracranial abnormality. No acute cervical spine abnormality. Sequela of chronic ischemic microvascular disease. Moderate to severe spondylosis and degenerative disc disease of the cervical spine. Electronically signed by:  Kaleb Kellogg MD  2/17/2021 1:35 PM CST Workstation: [id number]    Discussed results with patient.  Gave educational materials.  Advised close follow up with PCP/Neurosurgeon.  Return to emergency department for new or worsening symptoms.                                         MDM    Final  diagnoses:   Nonintractable headache, unspecified chronicity pattern, unspecified headache type   Neck pain            Robin Hanley PA-C  02/17/21 [id number]

## 2021-02-19 ENCOUNTER — OFFICE VISIT (OUTPATIENT)
Dept: FAMILY MEDICINE CLINIC | Facility: CLINIC | Age: 76
End: 2021-02-19

## 2021-02-19 VITALS
HEART RATE: 70 BPM | HEIGHT: 67 IN | WEIGHT: 167 LBS | SYSTOLIC BLOOD PRESSURE: 140 MMHG | DIASTOLIC BLOOD PRESSURE: 70 MMHG | BODY MASS INDEX: 26.21 KG/M2

## 2021-02-19 DIAGNOSIS — M50.90 CERVICAL DISC DISEASE: Chronic | ICD-10-CM

## 2021-02-19 DIAGNOSIS — M15.9 DEGENERATIVE JOINT DISEASE INVOLVING MULTIPLE JOINTS ON BOTH SIDES OF BODY: Primary | Chronic | ICD-10-CM

## 2021-02-19 DIAGNOSIS — L71.9 ROSACEA: Chronic | ICD-10-CM

## 2021-02-19 DIAGNOSIS — I10 ESSENTIAL HYPERTENSION: Chronic | ICD-10-CM

## 2021-02-19 DIAGNOSIS — M51.36 DEGENERATIVE DISC DISEASE, LUMBAR: Chronic | ICD-10-CM

## 2021-02-19 PROCEDURE — 99214 OFFICE O/P EST MOD 30 MIN: CPT | Performed by: GENERAL PRACTICE

## 2021-02-19 RX ORDER — LISINOPRIL 20 MG/1
20 TABLET ORAL DAILY
Qty: 90 TABLET | Refills: 3 | Status: SHIPPED | OUTPATIENT
Start: 2021-02-19 | End: 2022-02-10 | Stop reason: SDUPTHER

## 2021-02-19 RX ORDER — MINOCYCLINE HYDROCHLORIDE 100 MG/1
100 CAPSULE ORAL 2 TIMES DAILY
Qty: 14 CAPSULE | Refills: 0 | Status: SHIPPED | OUTPATIENT
Start: 2021-02-19 | End: 2021-07-09

## 2021-02-19 RX ORDER — GABAPENTIN 300 MG/1
CAPSULE ORAL
Qty: 270 CAPSULE | Refills: 1 | Status: SHIPPED | OUTPATIENT
Start: 2021-02-19 | End: 2021-07-09 | Stop reason: SDUPTHER

## 2021-02-19 RX ORDER — HYDROCODONE BITARTRATE AND ACETAMINOPHEN 5; 325 MG/1; MG/1
1 TABLET ORAL EVERY 6 HOURS PRN
Qty: 120 TABLET | Refills: 0 | Status: SHIPPED | OUTPATIENT
Start: 2021-02-19 | End: 2021-04-09 | Stop reason: SDUPTHER

## 2021-03-07 NOTE — PROGRESS NOTES
Subjective   Jaleesa Nam is a 76 y.o. female.   Chief Complaint   Patient presents with   • Hypertension   • Back Pain   • Neck Pain     For review and evaluation of management of chronic medical problems.  Had an increase headache 2 days ago and therefore was seen in the emergency room.  Had CT of the head and C-spine which were normal apart from severe spondylosis and degenerative disc disease.  Has been to pain management but has not found much relief so far.  Also has seen neurosurgery and no new surgical option recommended.  Neck Pain   This is a chronic problem. The current episode started more than 1 year ago. The problem occurs constantly. The problem has been gradually worsening. The pain is associated with nothing. The pain is present in the midline, right side and left side. The pain is at a severity of 6/10. The pain is severe. The symptoms are aggravated by position and twisting. The pain is same all the time. Stiffness is present in the morning. Pertinent negatives include no chest pain, fever, headaches, numbness, paresis, trouble swallowing, weakness or weight loss. She has tried oral narcotics for the symptoms. The treatment provided moderate relief.   Back Pain  This is a new problem. The current episode started more than 1 month ago. The problem occurs constantly. The problem has been gradually worsening since onset. The pain is present in the gluteal and lumbar spine. The quality of the pain is described as aching. The pain does not radiate. The pain is at a severity of 6/10. The pain is moderate. The pain is the same all the time. The symptoms are aggravated by bending and sitting. Stiffness is present all day. Pertinent negatives include no abdominal pain, bladder incontinence, bowel incontinence, chest pain, dysuria, fever, headaches, numbness, paresis, paresthesias, pelvic pain, perianal numbness, weakness or weight loss. Risk factors include history of cancer. She has tried  analgesics for the symptoms. The treatment provided significant relief.   Arthritis  Presents for follow-up visit. She complains of pain, stiffness and joint warmth. She reports no joint swelling. The symptoms have been stable. Affected locations include the right shoulder, left shoulder, left MCP, right MCP, left knee, right knee, right hip, left hip and neck. Her pain is at a severity of 6/10. Associated symptoms include pain at night. Pertinent negatives include no diarrhea, dysuria, fatigue, fever, rash, Raynaud's syndrome or weight loss. Compliance with total regimen is %. Compliance with medications is %.   Hypertension  This is a chronic problem. The current episode started more than 1 year ago. The problem is unchanged. The problem is controlled. Associated symptoms include neck pain. Pertinent negatives include no chest pain, headaches, palpitations or shortness of breath. Current antihypertension treatment includes ACE inhibitors and calcium channel blockers. The current treatment provides significant improvement. There are no compliance problems.    Rash  This is a recurrent problem. The current episode started more than 1 year ago. The problem has been waxing and waning since onset. The affected locations include the face. The rash is characterized by redness. She was exposed to nothing. Pertinent negatives include no anorexia, diarrhea, eye pain, facial edema, fatigue, fever or shortness of breath. Treatments tried: Metronidazole, minocycline. The treatment provided moderate relief.      The following portions of the patient's history were reviewed and updated as appropriate: allergies, current medications, past social history and problem list.    Outpatient Medications Prior to Visit   Medication Sig Dispense Refill   • amitriptyline (ELAVIL) 25 MG tablet Take 1 tablet by mouth Every Night. 90 tablet 3   • amLODIPine (NORVASC) 10 MG tablet Take 1 tablet by mouth Daily. 90 tablet 3   •  "calcium carbonate-vitamin d (CALTRATE 600+D) 600-400 MG-UNIT per tablet Take 1 tablet by mouth daily.     • gabapentin (NEURONTIN) 300 MG capsule TAKE ONE CAPSULE BY MOUTH EVERY MORNING AND take TWO capsules BY MOUTH AT BEDTIME 270 capsule 0   • HYDROcodone-acetaminophen (NORCO) 5-325 MG per tablet Take 1 tablet by mouth Every 6 (Six) Hours As Needed for Moderate Pain (4-6). 120 tablet 0   • lisinopril (PRINIVIL,ZESTRIL) 20 MG tablet TAKE 1 TABLET BY MOUTH EVERY DAY 90 tablet 2   • metroNIDAZOLE (METROCREAM) 0.75 % cream Apply  topically to the appropriate area as directed 2 (Two) Times a Day. 45 g 2     Facility-Administered Medications Prior to Visit   Medication Dose Route Frequency Provider Last Rate Last Admin   • triamcinolone acetonide (KENALOG-40) injection 60 mg  60 mg Intramuscular Once Emi Wright MD         I have reviewed 12 systems with patient. Findings were negative except what is noted below and/or in history of present illness.   Review of Systems   Constitutional: Negative for fatigue, fever and weight loss.   HENT: Negative for trouble swallowing.    Eyes: Negative for pain.   Respiratory: Negative for shortness of breath.    Cardiovascular: Negative for chest pain and palpitations.   Gastrointestinal: Negative for abdominal pain, anorexia, bowel incontinence and diarrhea.   Genitourinary: Negative for bladder incontinence, dysuria and pelvic pain.   Musculoskeletal: Positive for arthritis, back pain, neck pain and stiffness. Negative for joint swelling.   Skin: Negative for rash.   Neurological: Negative for weakness, numbness, headaches and paresthesias.       Objective   Visit Vitals  /70 (BP Location: Left arm)   Pulse 70   Ht 170.2 cm (67\")   Wt 75.8 kg (167 lb)   BMI 26.16 kg/m²     Physical Exam  Vitals and nursing note reviewed.   Constitutional:       General: She is not in acute distress.     Appearance: She is well-developed.   HENT:      Head: Normocephalic and " atraumatic.      Nose: Nose normal.   Eyes:      General:         Right eye: No discharge.         Left eye: No discharge.      Conjunctiva/sclera: Conjunctivae normal.      Pupils: Pupils are equal, round, and reactive to light.   Neck:      Thyroid: No thyromegaly.   Cardiovascular:      Rate and Rhythm: Normal rate and regular rhythm.      Heart sounds: Normal heart sounds.   Pulmonary:      Effort: Pulmonary effort is normal.      Breath sounds: Normal breath sounds.   Musculoskeletal:      Cervical back: Spinous process tenderness and muscular tenderness present. Decreased range of motion.      Lumbar back: Tenderness present. Decreased range of motion.   Lymphadenopathy:      Cervical: No cervical adenopathy.   Skin:     General: Skin is warm and dry.   Neurological:      Mental Status: She is alert and oriented to person, place, and time.       Notes brought forward are reviewed and updated if indicated.     Assessment/Plan   Problems Addressed this Visit        Cardiac and Vasculature    Essential hypertension (Chronic)    Relevant Medications    lisinopril (PRINIVIL,ZESTRIL) 20 MG tablet       Musculoskeletal and Injuries    Degenerative joint disease involving multiple joints on both sides of body - Primary (Chronic)    Relevant Medications    HYDROcodone-acetaminophen (NORCO) 5-325 MG per tablet    Other Relevant Orders    Sedimentation Rate       Neuro    Degenerative disc disease, lumbar (Chronic)    Relevant Medications    HYDROcodone-acetaminophen (NORCO) 5-325 MG per tablet    gabapentin (NEURONTIN) 300 MG capsule    Cervical disc disease (Chronic)    Relevant Medications    HYDROcodone-acetaminophen (NORCO) 5-325 MG per tablet    gabapentin (NEURONTIN) 300 MG capsule       Skin    Rosacea (Chronic)    Relevant Medications    minocycline (MINOCIN,DYNACIN) 100 MG capsule    metroNIDAZOLE (METROCREAM) 0.75 % cream      Diagnoses       Codes Comments    Degenerative joint disease involving multiple  joints on both sides of body    -  Primary ICD-10-CM: M15.9  ICD-9-CM: 715.89     Degenerative disc disease, lumbar     ICD-10-CM: M51.36  ICD-9-CM: 722.52     Cervical disc disease     ICD-10-CM: M50.90  ICD-9-CM: 722.91     Essential hypertension     ICD-10-CM: I10  ICD-9-CM: 401.9     Rosacea     ICD-10-CM: L71.9  ICD-9-CM: 695.3           Continue current treatment.  Follow-up with pain management as scheduled. Ricki reviewed and appropriate. Not recommended to drive or operate heavy equipment while taking potentially sedating meds.  Patient understands the risks associated with this controlled medication, including tolerance and addiction. They also agree to obtain this medication only from me, and not from a another provider, unless that provider is covering for me in my absence. They also agree to be compliant in dosing, and not self adjust the dose of medication.  A signed controlled substance agreement is on file, and they have received a controlled substance education sheet at this or a previous visit. They have also signed a consent for treatment with a controlled substance as per [facility] policy.        New Medications Ordered This Visit   Medications   • HYDROcodone-acetaminophen (NORCO) 5-325 MG per tablet     Sig: Take 1 tablet by mouth Every 6 (Six) Hours As Needed for Moderate Pain (4-6).     Dispense:  120 tablet     Refill:  0   • lisinopril (PRINIVIL,ZESTRIL) 20 MG tablet     Sig: Take 1 tablet by mouth Daily.     Dispense:  90 tablet     Refill:  3   • gabapentin (NEURONTIN) 300 MG capsule     Sig: TAKE ONE CAPSULE BY MOUTH EVERY MORNING AND take TWO capsules BY MOUTH AT BEDTIME     Dispense:  270 capsule     Refill:  1   • minocycline (MINOCIN,DYNACIN) 100 MG capsule     Sig: Take 1 capsule by mouth 2 (Two) Times a Day.     Dispense:  14 capsule     Refill:  0   • metroNIDAZOLE (METROCREAM) 0.75 % cream     Sig: Apply  topically to the appropriate area as directed 2 (Two) Times a Day.      Dispense:  45 g     Refill:  2     Return in about 2 months (around 4/19/2021) for Recheck.

## 2021-03-15 ENCOUNTER — APPOINTMENT (OUTPATIENT)
Dept: GENERAL RADIOLOGY | Facility: HOSPITAL | Age: 76
End: 2021-03-15

## 2021-03-15 ENCOUNTER — HOSPITAL ENCOUNTER (EMERGENCY)
Facility: HOSPITAL | Age: 76
Discharge: HOME | End: 2021-03-15
Attending: EMERGENCY MEDICINE | Admitting: FAMILY MEDICINE

## 2021-03-15 ENCOUNTER — BULK ORDERING (OUTPATIENT)
Dept: CASE MANAGEMENT | Facility: OTHER | Age: 76
End: 2021-03-15

## 2021-03-15 ENCOUNTER — APPOINTMENT (OUTPATIENT)
Dept: MRI IMAGING | Facility: HOSPITAL | Age: 76
End: 2021-03-15

## 2021-03-15 VITALS
TEMPERATURE: 97.5 F | SYSTOLIC BLOOD PRESSURE: 166 MMHG | HEART RATE: 61 BPM | DIASTOLIC BLOOD PRESSURE: 81 MMHG | HEIGHT: 67 IN | OXYGEN SATURATION: 98 % | BODY MASS INDEX: 24.8 KG/M2 | RESPIRATION RATE: 18 BRPM | WEIGHT: 158 LBS

## 2021-03-15 DIAGNOSIS — M54.2 CERVICAL PAIN (NECK): Primary | ICD-10-CM

## 2021-03-15 DIAGNOSIS — Z23 IMMUNIZATION DUE: ICD-10-CM

## 2021-03-15 DIAGNOSIS — M47.812 CERVICAL ARTHRITIS: ICD-10-CM

## 2021-03-15 LAB
ALBUMIN SERPL-MCNC: 4.2 G/DL (ref 3.5–5.2)
ALBUMIN/GLOB SERPL: 1.3 G/DL
ALP SERPL-CCNC: 94 U/L (ref 39–117)
ALT SERPL W P-5'-P-CCNC: 19 U/L (ref 1–33)
ANION GAP SERPL CALCULATED.3IONS-SCNC: 10 MMOL/L (ref 5–15)
AST SERPL-CCNC: 26 U/L (ref 1–32)
BASOPHILS # BLD AUTO: 0.08 10*3/MM3 (ref 0–0.2)
BASOPHILS NFR BLD AUTO: 1.6 % (ref 0–1.5)
BILIRUB SERPL-MCNC: 0.6 MG/DL (ref 0–1.2)
BUN SERPL-MCNC: 18 MG/DL (ref 8–23)
BUN/CREAT SERPL: 19.4 (ref 7–25)
CALCIUM SPEC-SCNC: 9.6 MG/DL (ref 8.6–10.5)
CHLORIDE SERPL-SCNC: 106 MMOL/L (ref 98–107)
CO2 SERPL-SCNC: 26 MMOL/L (ref 22–29)
CREAT SERPL-MCNC: 0.93 MG/DL (ref 0.57–1)
DEPRECATED RDW RBC AUTO: 40.9 FL (ref 37–54)
EOSINOPHIL # BLD AUTO: 0.25 10*3/MM3 (ref 0–0.4)
EOSINOPHIL NFR BLD AUTO: 5.2 % (ref 0.3–6.2)
ERYTHROCYTE [DISTWIDTH] IN BLOOD BY AUTOMATED COUNT: 12.1 % (ref 12.3–15.4)
GFR SERPL CREATININE-BSD FRML MDRD: 59 ML/MIN/1.73
GLOBULIN UR ELPH-MCNC: 3.2 GM/DL
GLUCOSE SERPL-MCNC: 105 MG/DL (ref 65–99)
HCT VFR BLD AUTO: 45.4 % (ref 34–46.6)
HGB BLD-MCNC: 15.6 G/DL (ref 12–15.9)
HOLD SPECIMEN: NORMAL
IMM GRANULOCYTES # BLD AUTO: 0.01 10*3/MM3 (ref 0–0.05)
IMM GRANULOCYTES NFR BLD AUTO: 0.2 % (ref 0–0.5)
LYMPHOCYTES # BLD AUTO: 2.39 10*3/MM3 (ref 0.7–3.1)
LYMPHOCYTES NFR BLD AUTO: 49.3 % (ref 19.6–45.3)
MCH RBC QN AUTO: 31.7 PG (ref 26.6–33)
MCHC RBC AUTO-ENTMCNC: 34.4 G/DL (ref 31.5–35.7)
MCV RBC AUTO: 92.3 FL (ref 79–97)
MONOCYTES # BLD AUTO: 0.41 10*3/MM3 (ref 0.1–0.9)
MONOCYTES NFR BLD AUTO: 8.5 % (ref 5–12)
NEUTROPHILS NFR BLD AUTO: 1.71 10*3/MM3 (ref 1.7–7)
NEUTROPHILS NFR BLD AUTO: 35.2 % (ref 42.7–76)
NRBC BLD AUTO-RTO: 0 /100 WBC (ref 0–0.2)
PLATELET # BLD AUTO: 389 10*3/MM3 (ref 140–450)
PMV BLD AUTO: 9.4 FL (ref 6–12)
POTASSIUM SERPL-SCNC: 4 MMOL/L (ref 3.5–5.2)
PROT SERPL-MCNC: 7.4 G/DL (ref 6–8.5)
QT INTERVAL: 426 MS
QTC INTERVAL: 439 MS
RBC # BLD AUTO: 4.92 10*6/MM3 (ref 3.77–5.28)
SODIUM SERPL-SCNC: 142 MMOL/L (ref 136–145)
TROPONIN T SERPL-MCNC: <0.01 NG/ML (ref 0–0.03)
TROPONIN T SERPL-MCNC: <0.01 NG/ML (ref 0–0.03)
WBC # BLD AUTO: 4.85 10*3/MM3 (ref 3.4–10.8)

## 2021-03-15 PROCEDURE — 80053 COMPREHEN METABOLIC PANEL: CPT | Performed by: EMERGENCY MEDICINE

## 2021-03-15 PROCEDURE — 85025 COMPLETE CBC W/AUTO DIFF WBC: CPT | Performed by: EMERGENCY MEDICINE

## 2021-03-15 PROCEDURE — A9579 GAD-BASE MR CONTRAST NOS,1ML: HCPCS | Performed by: FAMILY MEDICINE

## 2021-03-15 PROCEDURE — 96374 THER/PROPH/DIAG INJ IV PUSH: CPT

## 2021-03-15 PROCEDURE — 99284 EMERGENCY DEPT VISIT MOD MDM: CPT

## 2021-03-15 PROCEDURE — 96375 TX/PRO/DX INJ NEW DRUG ADDON: CPT

## 2021-03-15 PROCEDURE — 25010000002 MORPHINE PER 10 MG: Performed by: EMERGENCY MEDICINE

## 2021-03-15 PROCEDURE — 84484 ASSAY OF TROPONIN QUANT: CPT | Performed by: EMERGENCY MEDICINE

## 2021-03-15 PROCEDURE — 93010 ELECTROCARDIOGRAM REPORT: CPT | Performed by: INTERNAL MEDICINE

## 2021-03-15 PROCEDURE — 25010000002 ORPHENADRINE CITRATE PER 60 MG: Performed by: EMERGENCY MEDICINE

## 2021-03-15 PROCEDURE — 25010000002 GADOTERIDOL PER 1 ML: Performed by: FAMILY MEDICINE

## 2021-03-15 PROCEDURE — 72156 MRI NECK SPINE W/O & W/DYE: CPT

## 2021-03-15 PROCEDURE — 25010000002 LORAZEPAM PER 2 MG: Performed by: FAMILY MEDICINE

## 2021-03-15 PROCEDURE — 93005 ELECTROCARDIOGRAM TRACING: CPT

## 2021-03-15 PROCEDURE — 71045 X-RAY EXAM CHEST 1 VIEW: CPT

## 2021-03-15 RX ORDER — ORPHENADRINE CITRATE 30 MG/ML
60 INJECTION INTRAMUSCULAR; INTRAVENOUS ONCE
Status: COMPLETED | OUTPATIENT
Start: 2021-03-15 | End: 2021-03-15

## 2021-03-15 RX ORDER — LORAZEPAM 2 MG/ML
1 INJECTION INTRAMUSCULAR ONCE
Status: COMPLETED | OUTPATIENT
Start: 2021-03-15 | End: 2021-03-15

## 2021-03-15 RX ORDER — SODIUM CHLORIDE 0.9 % (FLUSH) 0.9 %
10 SYRINGE (ML) INJECTION AS NEEDED
Status: DISCONTINUED | OUTPATIENT
Start: 2021-03-15 | End: 2021-03-15 | Stop reason: HOSPADM

## 2021-03-15 RX ORDER — MORPHINE SULFATE 2 MG/ML
2 INJECTION, SOLUTION INTRAMUSCULAR; INTRAVENOUS ONCE
Status: COMPLETED | OUTPATIENT
Start: 2021-03-15 | End: 2021-03-15

## 2021-03-15 RX ORDER — PREDNISONE 20 MG/1
20 TABLET ORAL 2 TIMES DAILY
Qty: 10 TABLET | Refills: 0 | Status: SHIPPED | OUTPATIENT
Start: 2021-03-15 | End: 2021-07-09

## 2021-03-15 RX ORDER — LIDOCAINE 50 MG/G
1 PATCH TOPICAL ONCE
Status: DISCONTINUED | OUTPATIENT
Start: 2021-03-15 | End: 2021-03-15 | Stop reason: HOSPADM

## 2021-03-15 RX ADMIN — LORAZEPAM 1 MG: 2 INJECTION INTRAMUSCULAR; INTRAVENOUS at 09:00

## 2021-03-15 RX ADMIN — MORPHINE SULFATE 2 MG: 2 INJECTION, SOLUTION INTRAMUSCULAR; INTRAVENOUS at 06:26

## 2021-03-15 RX ADMIN — LIDOCAINE 1 PATCH: 50 PATCH CUTANEOUS at 06:36

## 2021-03-15 RX ADMIN — ORPHENADRINE CITRATE 60 MG: 60 INJECTION INTRAMUSCULAR; INTRAVENOUS at 06:26

## 2021-03-15 RX ADMIN — GADOTERIDOL 15 ML: 279.3 INJECTION, SOLUTION INTRAVENOUS at 10:10

## 2021-03-15 NOTE — ED PROVIDER NOTES
Subjective   76-year-old female with no significant cardiac history presents the emergency department with pain in her left neck and left arm that was there when she woke up just prior to arrival.  She does have history of hypertension and hyperlipidemia for which she takes medication.  She also has significant history of degenerative disc disease in her neck with previous surgery x2.  She is also had injections in her neck about 2-1/2 weeks ago that did not help her symptoms much.  She reports she is never had pain in her arm with her neck pain and that is what concerned her.  No significant personal cardiac history but does report family cardiac history.  She takes gabapentin and hydrocodone regularly with minimal improvement of her symptoms.    Family history, surgical history, social history, current medications and allergies are reviewed with the patient and triage documentation and vitals are reviewed.      History provided by:  Patient, spouse and medical records   used: No        Review of Systems   Constitutional: Negative for chills, fatigue and fever.   HENT: Negative for congestion and sore throat.    Eyes: Negative for photophobia and visual disturbance.   Respiratory: Negative for cough, shortness of breath and wheezing.    Cardiovascular: Negative for chest pain, palpitations and leg swelling.   Gastrointestinal: Negative for abdominal pain, diarrhea, nausea and vomiting.   Endocrine: Negative for polydipsia, polyphagia and polyuria.   Genitourinary: Negative for dysuria, frequency and urgency.   Musculoskeletal: Positive for arthralgias and neck pain. Negative for back pain and myalgias.   Skin: Negative for color change, pallor, rash and wound.   Allergic/Immunologic: Negative.    Neurological: Negative for weakness, light-headedness, numbness and headaches.   Hematological: Negative.    Psychiatric/Behavioral: Negative.        Past Medical History:   Diagnosis Date   • Abrasion      Abrasion and/or friction burn      • Acute bronchitis    • Acute laryngitis     probably viral   • Acute maxillary sinusitis    • Acute pharyngitis    • Acute serous otitis media    • Acute sinusitis    • Acute urinary tract infection    • Ankle edema    • Aortic valve regurgitation    • Astigmatism    • Breast cancer (CMS/HCC)    • Bunion    • Chest pain    • Chest pain    • Contusion of foot    • Cough    • Diastolic dysfunction    • Dyspnea    • Encounter for general adult medical examination without abnormal findings    • Encounter for routine adult health examination    • Essential hypertension    • Gastroesophageal reflux disease    • Hammer toe    • Heart murmur    • Hip pain    • Hyperlipidemia    • Low back pain    • Malaise and fatigue    • Mammogram abnormal    • Menopause    • Myopia    • Need for immunization against influenza    • Need for prophylactic vaccination against Streptococcus pneumoniae (pneumococcus)    • Nuclear cataract    • Osteoarthritis    • Palpitations    • Plantar fasciitis    • Posterior subcapsular polar senile cataract    • Primary fibromyalgia syndrome    • Sciatica    • Screening for osteoporosis    • Screening mammogram, encounter for    • Shoulder pain    • Tear film insufficiency    • Trochanteric bursitis    • Upper respiratory infection    • Urinary tract infectious disease    • Vitamin D deficiency        No Known Allergies    Past Surgical History:   Procedure Laterality Date   • BREAST BIOPSY  02/10/2011    Stereotactic breast biopsy (3)    Sterotactic location guide localization of the abnormality with wire placement. Left lumpectomy. Faxigram.    • BREAST LUMPECTOMY     • BUNIONECTOMY Left 9/11/2019    Procedure: Reverdin bunionectomy, second hammertoe correction,  weil osteotomy                     c-arm;  Surgeon: Williams Perry DPM;  Location: [facility];  Service: Podiatry   • CARDIAC CATHETERIZATION  10/01/2010    Cardiac cath 61160 (1)    Minimal plaquing  in the first diagonal branch with evidence of good POORNIMA 3 flow. Preserved left ventricular systolic function with ejection fraction of 55%.    • CERVICAL SPINE SURGERY  06/09/1993    Cervical spine disk surgery (1)    C5-C6 anterior cervical discectomy and fusion with iliac bone graft.    • ENDOSCOPY  10/07/2015    Colon endoscopy 01814 (2)    negative cologard    • HIP ARTHROPLASTY  09/12/2011    Anesth, hip joint procedure (1)    Right total hip arthroplasty with adductor tenotomy. Osteoarthritis of the right hip.    • INJECTION OF MEDICATION  10/01/2014    Kenalog (4)      • INJECTION OF MEDICATION  09/03/2012    Rocephin (1)      • KNEE SURGERY  02/03/2016    Knee Surgery (1)    Left total knee arthroplasty.    • NECK SURGERY      Neck spinal fusion (1)      • NOSE SURGERY  11/18/1978    Treat nasal (nose) fracture (1) Nasal fracture reduction with splint fixation.    • OTHER SURGICAL HISTORY  06/22/2010    Correction of bunion (1)    Hallux valgus deformity of right foot. Base wedge bunionectomy of right foot.    • PAP SMEAR  12/10/2008   • SHOULDER SURGERY  06/17/2013    Shoulder arthroscopy/surgery (2)    Arthroscopy of the right shoulder with rotator cuff repair.    • TOTAL ABDOMINAL HYSTERECTOMY WITH SALPINGO OOPHORECTOMY     • TOTAL KNEE ARTHROPLASTY Left 02/03/2016       Family History   Problem Relation Age of Onset   • Coronary artery disease Other    • Hypertension Other    • Cancer Other         lung, stomach, bladder   • Arthritis Mother    • Hypertension Mother    • Heart disease Mother    • Hyperlipidemia Mother    • Cancer Maternal Grandmother    • Colon cancer Maternal Grandmother    • Cancer Maternal Grandfather        Social History     Socioeconomic History   • Marital status:      Spouse name: Not on file   • Number of children: Not on file   • Years of education: Not on file   • Highest education level: Not on file   Tobacco Use   • Smoking status: Never Smoker   • Smokeless tobacco:  Never Used   Substance and Sexual Activity   • Alcohol use: No   • Drug use: No   • Sexual activity: Defer           Objective   Physical Exam  Vitals and nursing note reviewed.   Constitutional:       General: She is not in acute distress.     Appearance: Normal appearance. She is not ill-appearing, toxic-appearing or diaphoretic.   HENT:      Head: Normocephalic.   Eyes:      Conjunctiva/sclera: Conjunctivae normal.      Pupils: Pupils are equal, round, and reactive to light.   Neck:     Cardiovascular:      Rate and Rhythm: Normal rate and regular rhythm.      Pulses: Normal pulses.      Heart sounds: No murmur.   Pulmonary:      Effort: Pulmonary effort is normal.      Breath sounds: Normal breath sounds. No wheezing or rhonchi.   Abdominal:      General: Bowel sounds are normal.      Palpations: Abdomen is soft.   Musculoskeletal:         General: Tenderness present.      Left shoulder: Tenderness present. No swelling, deformity, bony tenderness or crepitus. Normal pulse.      Left upper arm: Tenderness present. No swelling, edema, deformity or bony tenderness.      Cervical back: Tenderness present. No rigidity. Pain with movement and muscular tenderness present. No spinous process tenderness. Decreased range of motion.      Right lower leg: No edema.      Left lower leg: No edema.   Lymphadenopathy:      Cervical: No cervical adenopathy.   Skin:     General: Skin is warm and dry.      Capillary Refill: Capillary refill takes less than 2 seconds.   Neurological:      General: No focal deficit present.      Mental Status: She is alert and oriented to person, place, and time.      Sensory: No sensory deficit.      Motor: No weakness.      Deep Tendon Reflexes: Reflexes normal.   Psychiatric:         Mood and Affect: Mood normal.         Behavior: Behavior normal.         ECG 12 Lead      Date/Time: 3/15/2021 12:12 PM  Performed by: Efren Corrales MD  Authorized by: Efrne Corrales MD   Rhythm: sinus  rhythm  Rate: normal  BPM: 64  Conduction: 1st degree  ST Segments: ST segments normal  T Waves: T waves normal          none         ED Course  ED Course as of Mar 15 1215   Mon Mar 15, 2021   1213 Findings discussed in detail with patient and .  Admission for chest pain observation was offered secondary to the patient's left arm pain, but she denies chest pain and does not feel admission is necessary.  She was advised to follow-up with primary care for which she has an appointment next week and with her surgeon, Dr. Hendrix who she has seen in the past.  Should she develop any fever, or change in symptoms, she is to return to the emergency department.    [CB]      ED Course User Index  [CB] Efren Corrales MD      Labs Reviewed   COMPREHENSIVE METABOLIC PANEL - Abnormal; Notable for the following components:       Result Value    Glucose 105 (*)     eGFR Non  Amer 59 (*)     All other components within normal limits    Narrative:     GFR Normal >60  Chronic Kidney Disease <60  Kidney Failure <15     CBC WITH AUTO DIFFERENTIAL - Abnormal; Notable for the following components:    RDW 12.1 (*)     Neutrophil % 35.2 (*)     Lymphocyte % 49.3 (*)     Basophil % 1.6 (*)     All other components within normal limits   TROPONIN (IN-HOUSE) - Normal    Narrative:     Troponin T Reference Range:  <= 0.03 ng/mL-   Negative for AMI  >0.03 ng/mL-     Abnormal for myocardial necrosis.  Clinicians would have to utilize clinical acumen, EKG, Troponin and serial changes to determine if it is an Acute Myocardial Infarction or myocardial injury due to an underlying chronic condition.       Results may be falsely decreased if patient taking Biotin.     TROPONIN (IN-HOUSE) - Normal    Narrative:     Troponin T Reference Range:  <= 0.03 ng/mL-   Negative for AMI  >0.03 ng/mL-     Abnormal for myocardial necrosis.  Clinicians would have to utilize clinical acumen, EKG, Troponin and serial changes to determine if it is  an Acute Myocardial Infarction or myocardial injury due to an underlying chronic condition.       Results may be falsely decreased if patient taking Biotin.     CBC AND DIFFERENTIAL    Narrative:     The following orders were created for panel order CBC & Differential.  Procedure                               Abnormality         Status                     ---------                               -----------         ------                     CBC Auto Differential[[id number]]        Abnormal            Final result                 Please view results for these tests on the individual orders.   GOLD TOP - SST   EXTRA TUBES    Narrative:     The following orders were created for panel order Extra Tubes.  Procedure                               Abnormality         Status                     ---------                               -----------         ------                     Light Blue Top[[id number]]                                                              Gold Top - SST[[id number]]                                   Final result                 Please view results for these tests on the individual orders.   LIGHT BLUE TOP     CT Head Without Contrast    Result Date: 2/17/2021  Narrative: EXAM: CT HEAD WITHOUT IV CONTRAST, CT CERVICAL SPINE WITHOUT IV CONTRAST INDICATION: Headache COMPARISONS: CT head 8/27/2020 TECHNIQUE: Noncontrast CT images were obtained through the brain and cervical spine. Reformats were provided. All CT scans at this facility uses dose modulation, iterative reconstruction, and/or weight-based dosing when appropriate to achieve a radiation dose as low as reasonably achievable. FINDINGS: Head: No intracranial hemorrhage, appreciable mass, mass effect or midline shift. There is preservation of the gray-white matter differentiation. No dense MCA or insular ribbon sign. There is minimal cerebral volume loss with ex vacuo ventricular dilatation. Confluent periventricular hypodensities are likely  sequela of chronic ischemic microvascular disease. Calvarium is intact. Paranasal sinuses are unremarkable. Mastoid air cells are clear. Postsurgical changes of the lenses. Cervical Spine: No acute fracture. Dens is intact. Lateral masses are symmetric. 2 mm anterolisthesis of C2 on C3 with severe facet arthrosis. 1 mm anterolisthesis of C7 on T1. There are prominent degenerative changes about the atlantoaxial interval. There is ankylosis of the C5-C7 vertebral bodies. Prominent anterior osteophytes are seen at C4/5. Small osteophytes are seen at C7/T1. There is C4/5 disc space height loss with endplate sclerosis. No traumatic spinal canal stenosis. Facet arthrosis seen throughout the cervical spine along with associated neural foraminal narrowing, most significant at C4/5. No suspicious osseous lesion. The pre-vertebral soft tissues are within normal limits. Paraspinal soft tissues are unremarkable. 9 mm right thyroid nodule is noted, no further follow-up required by ACR criteria.        Impression: No acute intracranial abnormality. No acute cervical spine abnormality. Sequela of chronic ischemic microvascular disease. Moderate to severe spondylosis and degenerative disc disease of the cervical spine. Electronically signed by:  Kaleb Kellogg MD  2/17/2021 1:35 PM CST Workstation: [id number]    CT Cervical Spine Without Contrast    Result Date: 2/17/2021  Narrative: EXAM: CT HEAD WITHOUT IV CONTRAST, CT CERVICAL SPINE WITHOUT IV CONTRAST INDICATION: Headache COMPARISONS: CT head 8/27/2020 TECHNIQUE: Noncontrast CT images were obtained through the brain and cervical spine. Reformats were provided. All CT scans at this facility uses dose modulation, iterative reconstruction, and/or weight-based dosing when appropriate to achieve a radiation dose as low as reasonably achievable. FINDINGS: Head: No intracranial hemorrhage, appreciable mass, mass effect or midline shift. There is preservation of the gray-white  matter differentiation. No dense MCA or insular ribbon sign. There is minimal cerebral volume loss with ex vacuo ventricular dilatation. Confluent periventricular hypodensities are likely sequela of chronic ischemic microvascular disease. Calvarium is intact. Paranasal sinuses are unremarkable. Mastoid air cells are clear. Postsurgical changes of the lenses. Cervical Spine: No acute fracture. Dens is intact. Lateral masses are symmetric. 2 mm anterolisthesis of C2 on C3 with severe facet arthrosis. 1 mm anterolisthesis of C7 on T1. There are prominent degenerative changes about the atlantoaxial interval. There is ankylosis of the C5-C7 vertebral bodies. Prominent anterior osteophytes are seen at C4/5. Small osteophytes are seen at C7/T1. There is C4/5 disc space height loss with endplate sclerosis. No traumatic spinal canal stenosis. Facet arthrosis seen throughout the cervical spine along with associated neural foraminal narrowing, most significant at C4/5. No suspicious osseous lesion. The pre-vertebral soft tissues are within normal limits. Paraspinal soft tissues are unremarkable. 9 mm right thyroid nodule is noted, no further follow-up required by ACR criteria.        Impression: No acute intracranial abnormality. No acute cervical spine abnormality. Sequela of chronic ischemic microvascular disease. Moderate to severe spondylosis and degenerative disc disease of the cervical spine. Electronically signed by:  Kaleb Kellogg MD  2/17/2021 1:35 PM CST Workstation: [id number]    MRI Cervical Spine With & Without Contrast    Result Date: 3/15/2021  Narrative: MRI cervical spine with and without contrast. HISTORY: Left-sided neck and arm pain. Epidural injection two weeks ago. Prior surgery Prior exam: CT cervical spine February 17, 2021. MRI cervical spine June 27, 2019. TECHNIQUE: Multiplanar multisequence images of the cervical spine obtained both prior to and following the intravenous infusion of 15 mL  ProHance. Anterior cervical fusion involving C5, C6, C7. Complete bony fusion of these vertebral bodies. Normal-appearing cervical spinal cord. No evidence of any significant central canal stenosis or myelomalacia. Normal signal intensity. Increased signal intensity, /bone edema is noted left lateral aspect of C1, midline, C2 odontoid process. This may reflect an active inflammatory process, inflammatory spondyloarthropathy involving C1 and C2. This is more pronounced in comparison with prior MRI June 27, 2019.  Posterior bony bar at the level of C6 causes effacement of the cervical subarachnoid space, mild central canal stenosis. Series 7 image 15. Left-sided foraminal stenosis at C6-C7 secondary to osteophytes. Right-sided foraminal stenosis at C4-C5 secondary to osteophytes.     Impression: Anterior cervical fusion involving C5, C6, C7. Prominent bony bar at C6 causes effacement of the cervical subarachnoid space and mild central canal stenosis. Left-sided foraminal stenosis at C6-C7. Right-sided foraminal stenosis at C4-C5 both due to degenerative changes, osteophytes. Subtle increased signal intensity, bone edema in the left lateral aspect of C1 and in the midline, odontoid process. This bone edema appears more pronounced in comparison to prior MRI examination but there is no evidence of any bony destruction or epidural abscess. This bone edema may be manifestation of inflammatory arthritic changes. MRI cervical spine with and without contrast is otherwise unremarkable. Electronically signed by:  Fadi Oakley MD  3/15/2021 10:55 AM CDT Workstation: [id number]    XR Chest 1 View    Result Date: 3/15/2021  Narrative: CHEST X-RAY 1 VIEW on 3/15/2021 CLINICAL INDICATION: Left neck and arm pain COMPARISON: None FINDINGS: The lungs are clear. Cardiac, hilar and mediastinal contours are within normal limits. Pulmonary vascularity is within normal limits. There has been prior resection of the right distal clavicle.  There is a high riding right humeral head suggesting chronic rotator cuff tear. No acute bony abnormality is noted.     Impression: No active disease. Electronically signed by:  Jeremy Mckee  3/15/2021 6:50 AM CDT Workstation: [id number]    EKG March 15, 2021 at 0 554 reveals sinus rhythm with first-degree AV block and a incomplete right bundle branch block at a rate of 64 bpm.  T wave flattening in aVL without inversion.  No ST elevation or depression.  No evidence of acute ischemia.      HEART Score (for prediction of 6-week risk of major adverse cardiac event) reviewed and/or performed as part of the patient evaluation and treatment planning process.  The result associated with this review/performance is: 2     HEART Score for Major Cardiac Events from [employer]  on 3/15/2021  ** All calculations should be rechecked by clinician prior to use **    RESULT SUMMARY:  2 points  Low Score (0-3 points)    Risk of MACE of 0.9-1.7%.      INPUTS:  History --> 0 = Slightly suspicious  EKG --> 0 = Normal  Age --> 2 = ?65  Risk factors --> 0 = No known risk factors  Initial troponin --> 0 = ?normal limit    MDM  Number of Diagnoses or Management Options     Amount and/or Complexity of Data Reviewed  Clinical lab tests: reviewed    Patient Progress  Patient progress: stable    0700 patient has unremarkable laboratory studies to this point.  Discussed with her concern for recent epidural injections and worsening pain.  Agreeable to MRI.  Also awaiting delta troponin.  Patient will be signed out to Dr. Corrales.  Please see his documentation for final disposition and treatment.    Final diagnoses:   Cervical pain (neck)   Cervical arthritis            Efren Corrales MD  03/15/21 1032       Efren Corrales MD  03/15/21 1215

## 2021-04-09 ENCOUNTER — OFFICE VISIT (OUTPATIENT)
Dept: FAMILY MEDICINE CLINIC | Facility: CLINIC | Age: 76
End: 2021-04-09

## 2021-04-09 VITALS
SYSTOLIC BLOOD PRESSURE: 122 MMHG | OXYGEN SATURATION: 97 % | HEIGHT: 67 IN | WEIGHT: 161 LBS | BODY MASS INDEX: 25.27 KG/M2 | DIASTOLIC BLOOD PRESSURE: 70 MMHG | HEART RATE: 68 BPM

## 2021-04-09 DIAGNOSIS — I10 ESSENTIAL HYPERTENSION: ICD-10-CM

## 2021-04-09 DIAGNOSIS — M15.9 DEGENERATIVE JOINT DISEASE INVOLVING MULTIPLE JOINTS ON BOTH SIDES OF BODY: Chronic | ICD-10-CM

## 2021-04-09 DIAGNOSIS — M50.90 CERVICAL DISC DISEASE: Primary | Chronic | ICD-10-CM

## 2021-04-09 DIAGNOSIS — M79.602 LEFT ARM PAIN: ICD-10-CM

## 2021-04-09 DIAGNOSIS — M51.36 DEGENERATIVE DISC DISEASE, LUMBAR: Chronic | ICD-10-CM

## 2021-04-09 DIAGNOSIS — M48.02 CERVICAL SPINAL STENOSIS: ICD-10-CM

## 2021-04-09 PROCEDURE — 99213 OFFICE O/P EST LOW 20 MIN: CPT | Performed by: GENERAL PRACTICE

## 2021-04-09 RX ORDER — AMLODIPINE BESYLATE 5 MG/1
5 TABLET ORAL DAILY
Qty: 90 TABLET | Refills: 3 | Status: SHIPPED | OUTPATIENT
Start: 2021-04-09 | End: 2022-05-25

## 2021-04-09 RX ORDER — HYDROCODONE BITARTRATE AND ACETAMINOPHEN 5; 325 MG/1; MG/1
1 TABLET ORAL EVERY 6 HOURS PRN
Qty: 120 TABLET | Refills: 0 | Status: SHIPPED | OUTPATIENT
Start: 2021-04-09 | End: 2021-05-10 | Stop reason: SDUPTHER

## 2021-04-09 NOTE — PROGRESS NOTES
Subjective   Jaleesa Nam is a 76 y.o. female.     Chief Complaint   Patient presents with   • Back Pain   • Osteoarthritis   • Neck Pain     For review and evaluation of management of chronic medical problems. Due for mammogram. Pain is nor controlled with medications.  Has seen pain clinic for her neck pain and injections have not been helpful.  She is requesting referral to neurosurgery.  Last MRI did show some foraminal and spinal stenosis.  No side effects from medication.        Study Result    Narrative & Impression   MRI cervical spine with and without contrast.     HISTORY: Left-sided neck and arm pain. Epidural injection two  weeks ago. Prior surgery     Prior exam: CT cervical spine February 17, 2021. MRI cervical  spine June 27, 2019.     TECHNIQUE: Multiplanar multisequence images of the cervical spine  obtained both prior to and following the intravenous infusion of  15 mL ProHance.     Anterior cervical fusion involving C5, C6, C7. Complete bony  fusion of these vertebral bodies.     Normal-appearing cervical spinal cord. No evidence of any  significant central canal stenosis or myelomalacia. Normal signal  intensity.     Increased signal intensity, /bone edema is noted left lateral  aspect of C1, midline, C2 odontoid process. This may reflect an  active inflammatory process, inflammatory spondyloarthropathy  involving C1 and C2.     This is more pronounced in comparison with prior MRI June 27, 2019.      Posterior bony bar at the level of C6 causes effacement of the  cervical subarachnoid space, mild central canal stenosis. Series  7 image 15. Left-sided foraminal stenosis at C6-C7 secondary to  osteophytes. Right-sided foraminal stenosis at C4-C5 secondary to  osteophytes.   IMPRESSION:  Anterior cervical fusion involving C5, C6, C7.     Prominent bony bar at C6 causes effacement of the cervical  subarachnoid space and mild central canal stenosis.     Left-sided foraminal stenosis at C6-C7.  Right-sided foraminal  stenosis at C4-C5 both due to degenerative changes, osteophytes.     Subtle increased signal intensity, bone edema in the left lateral  aspect of C1 and in the midline, odontoid process. This bone  edema appears more pronounced in comparison to prior MRI  examination but there is no evidence of any bony destruction or  epidural abscess. This bone edema may be manifestation of  inflammatory arthritic changes.     MRI cervical spine with and without contrast is otherwise  unremarkable.     Electronically signed by:  Fadi Oakley MD  3/15/2021 10:55 AM CDT  Workstation: [id number]       Neck Pain   This is a chronic problem. The current episode started more than 1 year ago. The problem occurs constantly. The problem has been gradually worsening. The pain is associated with nothing. The pain is present in the midline, right side and left side. The pain is at a severity of 8/10. The pain is severe. The symptoms are aggravated by position and twisting. The pain is same all the time. Stiffness is present in the morning. Pertinent negatives include no fever, numbness, paresis, trouble swallowing, weakness or weight loss. She has tried oral narcotics for the symptoms. The treatment provided moderate relief.   Back Pain  This is a new problem. The current episode started more than 1 month ago. The problem occurs constantly. The problem has been gradually worsening since onset. The pain is present in the gluteal and lumbar spine. The quality of the pain is described as aching. The pain does not radiate. The pain is at a severity of 8/10. The pain is moderate. The pain is the same all the time. The symptoms are aggravated by bending and sitting. Stiffness is present all day. Pertinent negatives include no abdominal pain, bladder incontinence, bowel incontinence, dysuria, fever, numbness, paresis, paresthesias, pelvic pain, perianal numbness, weakness or weight loss. Risk factors include history of  cancer. She has tried analgesics for the symptoms. The treatment provided significant relief.   Arthritis  Presents for follow-up visit. She complains of pain, stiffness and joint warmth. She reports no joint swelling. The symptoms have been stable. Affected locations include the right shoulder, left shoulder, left MCP, right MCP, left knee, right knee, right hip, left hip and neck. Her pain is at a severity of 8/10. Associated symptoms include pain at night. Pertinent negatives include no diarrhea, dysuria, fatigue, fever, rash, Raynaud's syndrome or weight loss. Compliance with total regimen is %. Compliance with medications is %.      The following portions of the patient's history were reviewed and updated as appropriate: allergies, current medications, past family and social history and problem list.    Outpatient Medications Prior to Visit   Medication Sig Dispense Refill   • amitriptyline (ELAVIL) 25 MG tablet Take 1 tablet by mouth Every Night. 90 tablet 3   • calcium carbonate-vitamin d (CALTRATE 600+D) 600-400 MG-UNIT per tablet Take 1 tablet by mouth daily.     • gabapentin (NEURONTIN) 300 MG capsule TAKE ONE CAPSULE BY MOUTH EVERY MORNING AND take TWO capsules BY MOUTH AT BEDTIME 270 capsule 1   • lisinopril (PRINIVIL,ZESTRIL) 20 MG tablet Take 1 tablet by mouth Daily. 90 tablet 3   • metroNIDAZOLE (METROCREAM) 0.75 % cream Apply  topically to the appropriate area as directed 2 (Two) Times a Day. 45 g 2   • minocycline (MINOCIN,DYNACIN) 100 MG capsule Take 1 capsule by mouth 2 (Two) Times a Day. 14 capsule 0   • predniSONE (DELTASONE) 20 MG tablet Take 1 tablet by mouth 2 (Two) Times a Day. 10 tablet 0   • amLODIPine (NORVASC) 10 MG tablet Take 1 tablet by mouth Daily. 90 tablet 3   • HYDROcodone-acetaminophen (NORCO) 5-325 MG per tablet Take 1 tablet by mouth Every 6 (Six) Hours As Needed for Moderate Pain (4-6). 120 tablet 0     Facility-Administered Medications Prior to Visit   Medication  "Dose Route Frequency Provider Last Rate Last Admin   • triamcinolone acetonide (KENALOG-40) injection 60 mg  60 mg Intramuscular Once Emi Wright MD           Review of Systems   Constitutional: Negative.  Negative for chills, fatigue, fever, unexpected weight change and weight loss.   HENT: Negative.  Negative for congestion, ear pain, hearing loss, nosebleeds, rhinorrhea, sneezing, sore throat, tinnitus and trouble swallowing.    Eyes: Negative.  Negative for discharge.   Respiratory: Negative.  Negative for cough and wheezing.    Cardiovascular: Negative.    Gastrointestinal: Negative.  Negative for abdominal pain, bowel incontinence, constipation, diarrhea, nausea and vomiting.   Endocrine: Negative.    Genitourinary: Negative.  Negative for bladder incontinence, dysuria, frequency, pelvic pain and urgency.   Musculoskeletal: Positive for arthralgias, arthritis, back pain, neck pain and stiffness. Negative for joint swelling and myalgias.   Skin: Negative.  Negative for rash.   Allergic/Immunologic: Negative.    Neurological: Negative.  Negative for dizziness, weakness, numbness and paresthesias.   Hematological: Negative.  Negative for adenopathy.   Psychiatric/Behavioral: Negative.  Negative for dysphoric mood and sleep disturbance. The patient is not nervous/anxious.      Objective     Visit Vitals  /70 (BP Location: Left arm)   Pulse 68   Ht 170.2 cm (67\")   Wt 73 kg (161 lb)   SpO2 97%   BMI 25.22 kg/m²     Physical Exam  Vitals and nursing note reviewed.   Constitutional:       General: She is not in acute distress.     Appearance: She is well-developed.   HENT:      Head: Normocephalic and atraumatic.      Nose: Nose normal.   Eyes:      General:         Right eye: No discharge.         Left eye: No discharge.      Conjunctiva/sclera: Conjunctivae normal.      Pupils: Pupils are equal, round, and reactive to light.   Neck:      Thyroid: No thyromegaly.   Cardiovascular:      Rate and Rhythm: " Normal rate and regular rhythm.      Heart sounds: Normal heart sounds.   Pulmonary:      Effort: Pulmonary effort is normal.      Breath sounds: Normal breath sounds.   Musculoskeletal:      Cervical back: Spasms and tenderness present. Pain with movement, spinous process tenderness and muscular tenderness present. Decreased range of motion.      Lumbar back: Tenderness present. Decreased range of motion.   Lymphadenopathy:      Cervical: No cervical adenopathy.   Skin:     General: Skin is warm and dry.   Neurological:      Mental Status: She is alert and oriented to person, place, and time.       Results for orders placed or performed during the hospital encounter of 03/15/21   Comprehensive Metabolic Panel    Specimen: Blood   Result Value Ref Range    Glucose 105 (H) 65 - 99 mg/dL    BUN 18 8 - 23 mg/dL    Creatinine 0.93 0.57 - 1.00 mg/dL    Sodium 142 136 - 145 mmol/L    Potassium 4.0 3.5 - 5.2 mmol/L    Chloride 106 98 - 107 mmol/L    CO2 26.0 22.0 - 29.0 mmol/L    Calcium 9.6 8.6 - 10.5 mg/dL    Total Protein 7.4 6.0 - 8.5 g/dL    Albumin 4.20 3.50 - 5.20 g/dL    ALT (SGPT) 19 1 - 33 U/L    AST (SGOT) 26 1 - 32 U/L    Alkaline Phosphatase 94 39 - 117 U/L    Total Bilirubin 0.6 0.0 - 1.2 mg/dL    eGFR Non African Amer 59 (L) >60 mL/min/1.73    Globulin 3.2 gm/dL    A/G Ratio 1.3 g/dL    BUN/Creatinine Ratio 19.4 7.0 - 25.0    Anion Gap 10.0 5.0 - 15.0 mmol/L   Troponin    Specimen: Blood   Result Value Ref Range    Troponin T <0.010 0.000 - 0.030 ng/mL   CBC Auto Differential    Specimen: Blood   Result Value Ref Range    WBC 4.85 3.40 - 10.80 10*3/mm3    RBC 4.92 3.77 - 5.28 10*6/mm3    Hemoglobin 15.6 12.0 - 15.9 g/dL    Hematocrit 45.4 34.0 - 46.6 %    MCV 92.3 79.0 - 97.0 fL    MCH 31.7 26.6 - 33.0 pg    MCHC 34.4 31.5 - 35.7 g/dL    RDW 12.1 (L) 12.3 - 15.4 %    RDW-SD 40.9 37.0 - 54.0 fl    MPV 9.4 6.0 - 12.0 fL    Platelets 389 140 - 450 10*3/mm3    Neutrophil % 35.2 (L) 42.7 - 76.0 %    Lymphocyte %  49.3 (H) 19.6 - 45.3 %    Monocyte % 8.5 5.0 - 12.0 %    Eosinophil % 5.2 0.3 - 6.2 %    Basophil % 1.6 (H) 0.0 - 1.5 %    Immature Grans % 0.2 0.0 - 0.5 %    Neutrophils, Absolute 1.71 1.70 - 7.00 10*3/mm3    Lymphocytes, Absolute 2.39 0.70 - 3.10 10*3/mm3    Monocytes, Absolute 0.41 0.10 - 0.90 10*3/mm3    Eosinophils, Absolute 0.25 0.00 - 0.40 10*3/mm3    Basophils, Absolute 0.08 0.00 - 0.20 10*3/mm3    Immature Grans, Absolute 0.01 0.00 - 0.05 10*3/mm3    nRBC 0.0 0.0 - 0.2 /100 WBC   Troponin    Specimen: Blood   Result Value Ref Range    Troponin T <0.010 0.000 - 0.030 ng/mL   ECG 12 Lead   Result Value Ref Range    QT Interval 426 ms    QTC Interval 439 ms   Gold Top - SST   Result Value Ref Range    Extra Tube Hold for add-ons.       Notes brought forward are reviewed and updated if indicated.     Assessment/Plan   Problems Addressed this Visit        Cardiac and Vasculature    Essential hypertension - Primary (Chronic)    Relevant Medications    amLODIPine (NORVASC) 5 MG tablet       Musculoskeletal and Injuries    Degenerative joint disease involving multiple joints on both sides of body (Chronic)    Relevant Medications    HYDROcodone-acetaminophen (NORCO) 5-325 MG per tablet       Neuro    Degenerative disc disease, lumbar (Chronic)    Relevant Medications    HYDROcodone-acetaminophen (NORCO) 5-325 MG per tablet    Cervical disc disease (Chronic)    Relevant Medications    HYDROcodone-acetaminophen (NORCO) 5-325 MG per tablet    Other Relevant Orders    Ambulatory Referral to Neurosurgery (Completed)      Other Visit Diagnoses     Cervical spinal stenosis        Relevant Orders    Ambulatory Referral to Neurosurgery (Completed)    Left arm pain        Relevant Orders    Ambulatory Referral to Neurosurgery (Completed)      Diagnoses       Codes Comments    Essential hypertension    -  Primary ICD-10-CM: I10  ICD-9-CM: 401.9     Degenerative disc disease, lumbar     ICD-10-CM: M51.36  ICD-9-CM: 722.52      Degenerative joint disease involving multiple joints on both sides of body     ICD-10-CM: M15.9  ICD-9-CM: 715.89     Cervical disc disease     ICD-10-CM: M50.90  ICD-9-CM: 722.91     Cervical spinal stenosis     ICD-10-CM: M48.02  ICD-9-CM: 723.0     Left arm pain     ICD-10-CM: M79.602  ICD-9-CM: 729.5         Referral to neurosurgery.  Continue current treatment. Ricki reviewed and appropriate. Not recommended to drive or operate heavy equipment while taking potentially sedating meds.  Patient understands the risks associated with this controlled medication, including tolerance and addiction. They also agree to obtain this medication only from me, and not from a another provider, unless that provider is covering for me in my absence. They also agree to be compliant in dosing, and not self adjust the dose of medication.  A signed controlled substance agreement is on file, and they have received a controlled substance education sheet at this or a previous visit. They have also signed a consent for treatment with a controlled substance as per [facility] policy.      New Medications Ordered This Visit   Medications   • amLODIPine (NORVASC) 5 MG tablet     Sig: Take 1 tablet by mouth Daily.     Dispense:  90 tablet     Refill:  3     To replace 10 mg   • HYDROcodone-acetaminophen (NORCO) 5-325 MG per tablet     Sig: Take 1 tablet by mouth Every 6 (Six) Hours As Needed for Moderate Pain (4-6).     Dispense:  120 tablet     Refill:  0     Return in about 3 months (around 7/9/2021) for Recheck.

## 2021-05-10 ENCOUNTER — TELEPHONE (OUTPATIENT)
Dept: FAMILY MEDICINE CLINIC | Facility: CLINIC | Age: 76
End: 2021-05-10

## 2021-05-10 DIAGNOSIS — M51.36 DEGENERATIVE DISC DISEASE, LUMBAR: Chronic | ICD-10-CM

## 2021-05-10 DIAGNOSIS — M50.90 CERVICAL DISC DISEASE: Chronic | ICD-10-CM

## 2021-05-10 DIAGNOSIS — M15.9 DEGENERATIVE JOINT DISEASE INVOLVING MULTIPLE JOINTS ON BOTH SIDES OF BODY: Chronic | ICD-10-CM

## 2021-05-10 RX ORDER — HYDROCODONE BITARTRATE AND ACETAMINOPHEN 5; 325 MG/1; MG/1
1 TABLET ORAL EVERY 6 HOURS PRN
Qty: 120 TABLET | Refills: 0 | Status: SHIPPED | OUTPATIENT
Start: 2021-05-10 | End: 2021-06-11 | Stop reason: SDUPTHER

## 2021-06-11 DIAGNOSIS — M15.9 DEGENERATIVE JOINT DISEASE INVOLVING MULTIPLE JOINTS ON BOTH SIDES OF BODY: Chronic | ICD-10-CM

## 2021-06-11 DIAGNOSIS — M50.90 CERVICAL DISC DISEASE: Chronic | ICD-10-CM

## 2021-06-11 DIAGNOSIS — M51.36 DEGENERATIVE DISC DISEASE, LUMBAR: Chronic | ICD-10-CM

## 2021-06-11 RX ORDER — HYDROCODONE BITARTRATE AND ACETAMINOPHEN 5; 325 MG/1; MG/1
1 TABLET ORAL EVERY 6 HOURS PRN
Qty: 120 TABLET | Refills: 0 | Status: SHIPPED | OUTPATIENT
Start: 2021-06-11 | End: 2021-07-09 | Stop reason: SDUPTHER

## 2021-06-15 ENCOUNTER — TELEPHONE (OUTPATIENT)
Dept: FAMILY MEDICINE CLINIC | Facility: CLINIC | Age: 76
End: 2021-06-15

## 2021-06-15 NOTE — TELEPHONE ENCOUNTER
Jaleesa said she saw Dr Montiel and he has scheduled her for a test and she wants to talk to Dr Wright about it first.   [phone]

## 2021-07-09 ENCOUNTER — TELEPHONE (OUTPATIENT)
Dept: FAMILY MEDICINE CLINIC | Facility: CLINIC | Age: 76
End: 2021-07-09

## 2021-07-09 ENCOUNTER — OFFICE VISIT (OUTPATIENT)
Dept: FAMILY MEDICINE CLINIC | Facility: CLINIC | Age: 76
End: 2021-07-09

## 2021-07-09 VITALS
DIASTOLIC BLOOD PRESSURE: 62 MMHG | WEIGHT: 165 LBS | HEART RATE: 71 BPM | SYSTOLIC BLOOD PRESSURE: 138 MMHG | BODY MASS INDEX: 25.9 KG/M2 | HEIGHT: 67 IN | OXYGEN SATURATION: 98 %

## 2021-07-09 DIAGNOSIS — E55.9 VITAMIN D DEFICIENCY: ICD-10-CM

## 2021-07-09 DIAGNOSIS — K59.09 OTHER CONSTIPATION: ICD-10-CM

## 2021-07-09 DIAGNOSIS — M50.90 CERVICAL DISC DISEASE: Primary | Chronic | ICD-10-CM

## 2021-07-09 DIAGNOSIS — I10 ESSENTIAL HYPERTENSION: ICD-10-CM

## 2021-07-09 DIAGNOSIS — M15.9 DEGENERATIVE JOINT DISEASE INVOLVING MULTIPLE JOINTS ON BOTH SIDES OF BODY: Chronic | ICD-10-CM

## 2021-07-09 DIAGNOSIS — N81.6 RECTOCELE: ICD-10-CM

## 2021-07-09 DIAGNOSIS — M51.36 DEGENERATIVE DISC DISEASE, LUMBAR: Chronic | ICD-10-CM

## 2021-07-09 PROCEDURE — 99214 OFFICE O/P EST MOD 30 MIN: CPT | Performed by: GENERAL PRACTICE

## 2021-07-09 RX ORDER — HYDROCODONE BITARTRATE AND ACETAMINOPHEN 5; 325 MG/1; MG/1
1 TABLET ORAL EVERY 6 HOURS PRN
Qty: 120 TABLET | Refills: 0 | Status: SHIPPED | OUTPATIENT
Start: 2021-07-09 | End: 2021-09-30 | Stop reason: SDUPTHER

## 2021-07-09 RX ORDER — GABAPENTIN 300 MG/1
CAPSULE ORAL
Qty: 270 CAPSULE | Refills: 1 | Status: SHIPPED | OUTPATIENT
Start: 2021-07-09 | End: 2021-11-23

## 2021-07-09 NOTE — PROGRESS NOTES
Subjective   Jaleesa Nam is a 76 y.o. female.   Chief Complaint   Patient presents with   • cervical disc disease   • vaginal issue     For review and evaluation of management of chronic medical problems. Records reviewed. Recent labs, xrays reviewed and medications reconciled. Saw Dr. Chapin and plan is for surgery if injections do not help. Has noticed a protrusion from the vagina when she wipes. Does have a lot of constipation.  Neck Pain   This is a chronic problem. The current episode started more than 1 year ago. The problem occurs constantly. The problem has been gradually worsening. The pain is associated with nothing. The pain is present in the midline, right side and left side. The pain is at a severity of 10/10. The pain is severe. The symptoms are aggravated by position and twisting. The pain is same all the time. Stiffness is present in the morning. Pertinent negatives include no numbness, paresis, trouble swallowing, weakness or weight loss. She has tried oral narcotics for the symptoms. The treatment provided moderate relief.   Back Pain  This is a new problem. The current episode started more than 1 month ago. The problem occurs constantly. The problem has been gradually worsening since onset. The pain is present in the gluteal and lumbar spine. The quality of the pain is described as aching. The pain does not radiate. The pain is at a severity of 8/10. The pain is moderate. The pain is the same all the time. The symptoms are aggravated by bending and sitting. Stiffness is present all day. Pertinent negatives include no bladder incontinence, bowel incontinence, numbness, paresis, paresthesias, perianal numbness, weakness or weight loss. Risk factors include history of cancer. She has tried analgesics for the symptoms. The treatment provided significant relief.   Arthritis  Presents for follow-up visit. She complains of pain, stiffness and joint warmth. She reports no joint swelling. The  symptoms have been stable. Affected locations include the right shoulder, left shoulder, left MCP, right MCP, left knee, right knee, right hip, left hip and neck. Her pain is at a severity of 8/10. Associated symptoms include pain at night. Pertinent negatives include no fatigue, Raynaud's syndrome or weight loss. Compliance with total regimen is %. Compliance with medications is %.      The following portions of the patient's history were reviewed and updated as appropriate: allergies, current medications, past social history and problem list.    Outpatient Medications Prior to Visit   Medication Sig Dispense Refill   • amitriptyline (ELAVIL) 25 MG tablet Take 1 tablet by mouth Every Night. 90 tablet 3   • amLODIPine (NORVASC) 5 MG tablet Take 1 tablet by mouth Daily. 90 tablet 3   • calcium carbonate-vitamin d (CALTRATE 600+D) 600-400 MG-UNIT per tablet Take 1 tablet by mouth daily.     • lisinopril (PRINIVIL,ZESTRIL) 20 MG tablet Take 1 tablet by mouth Daily. 90 tablet 3   • metroNIDAZOLE (METROCREAM) 0.75 % cream Apply  topically to the appropriate area as directed 2 (Two) Times a Day. 45 g 2   • gabapentin (NEURONTIN) 300 MG capsule TAKE ONE CAPSULE BY MOUTH EVERY MORNING AND take TWO capsules BY MOUTH AT BEDTIME 270 capsule 1   • HYDROcodone-acetaminophen (NORCO) 5-325 MG per tablet Take 1 tablet by mouth Every 6 (Six) Hours As Needed for Moderate Pain (4-6). 120 tablet 0   • minocycline (MINOCIN,DYNACIN) 100 MG capsule Take 1 capsule by mouth 2 (Two) Times a Day. 14 capsule 0   • predniSONE (DELTASONE) 20 MG tablet Take 1 tablet by mouth 2 (Two) Times a Day. 10 tablet 0     Facility-Administered Medications Prior to Visit   Medication Dose Route Frequency Provider Last Rate Last Admin   • triamcinolone acetonide (KENALOG-40) injection 60 mg  60 mg Intramuscular Once Emi Wright MD         I have reviewed 12 systems with patient. Findings were negative except what is noted below and/or in  "history of present illness.   Review of Systems   Constitutional: Negative for fatigue and weight loss.   HENT: Negative for trouble swallowing.    Gastrointestinal: Negative for bowel incontinence.   Genitourinary: Negative for bladder incontinence.   Musculoskeletal: Positive for arthritis, neck pain and stiffness. Negative for joint swelling.   Neurological: Negative for weakness, numbness and paresthesias.       Objective   Visit Vitals  /62 (BP Location: Left arm)   Pulse 71   Ht 170.2 cm (67\")   Wt 74.8 kg (165 lb)   SpO2 98%   BMI 25.84 kg/m²     Physical Exam  Vitals and nursing note reviewed.   Constitutional:       General: She is not in acute distress.     Appearance: She is well-developed.   HENT:      Head: Normocephalic and atraumatic.      Nose: Nose normal.   Eyes:      General:         Right eye: No discharge.         Left eye: No discharge.      Conjunctiva/sclera: Conjunctivae normal.      Pupils: Pupils are equal, round, and reactive to light.   Neck:      Thyroid: No thyromegaly.   Cardiovascular:      Rate and Rhythm: Normal rate and regular rhythm.      Heart sounds: Normal heart sounds.   Pulmonary:      Effort: Pulmonary effort is normal.      Breath sounds: Normal breath sounds.   Genitourinary:     Vagina: Prolapsed vaginal walls (rectocele) present.   Musculoskeletal:      Cervical back: Spasms and tenderness present. Pain with movement, spinous process tenderness and muscular tenderness present. Decreased range of motion.      Lumbar back: Tenderness present. Decreased range of motion.   Lymphadenopathy:      Cervical: No cervical adenopathy.   Skin:     General: Skin is warm and dry.   Neurological:      Mental Status: She is alert and oriented to person, place, and time.         Notes brought forward are reviewed and updated if indicated.     Assessment/Plan   Problems Addressed this Visit        Cardiac and Vasculature    Essential hypertension (Chronic)    Relevant Orders    " CBC & Differential    Comprehensive Metabolic Panel    Lipid Panel    Urinalysis With Culture If Indicated -    Vitamin D 25 Hydroxy       Musculoskeletal and Injuries    Degenerative joint disease involving multiple joints on both sides of body (Chronic)    Relevant Medications    HYDROcodone-acetaminophen (NORCO) 5-325 MG per tablet    Other Relevant Orders    ToxASSURE Select 13 (MW) - Urine, Clean Catch       Neuro    Degenerative disc disease, lumbar (Chronic)    Relevant Medications    HYDROcodone-acetaminophen (NORCO) 5-325 MG per tablet    gabapentin (NEURONTIN) 300 MG capsule    Other Relevant Orders    ToxASSURE Select 13 (MW) - Urine, Clean Catch    Cervical disc disease - Primary (Chronic)    Relevant Medications    HYDROcodone-acetaminophen (NORCO) 5-325 MG per tablet    gabapentin (NEURONTIN) 300 MG capsule    Other Relevant Orders    ToxASSURE Select 13 (MW) - Urine, Clean Catch      Other Visit Diagnoses     Rectocele        Other constipation        Vitamin D deficiency        Relevant Orders    Vitamin D 25 Hydroxy      Diagnoses       Codes Comments    Cervical disc disease    -  Primary ICD-10-CM: M50.90  ICD-9-CM: 722.91     Degenerative disc disease, lumbar     ICD-10-CM: M51.36  ICD-9-CM: 722.52     Degenerative joint disease involving multiple joints on both sides of body     ICD-10-CM: M15.9  ICD-9-CM: 715.89     Rectocele     ICD-10-CM: N81.6  ICD-9-CM: 618.04     Other constipation     ICD-10-CM: K59.09  ICD-9-CM: 564.09     Essential hypertension     ICD-10-CM: I10  ICD-9-CM: 401.9     Vitamin D deficiency     ICD-10-CM: E55.9  ICD-9-CM: 268.9          Continue current treatment. Follow up with surgeon as scheduled.  Reassured regarding rectocele, needs to keep her stool soft and avoid constipation.    Patient Instructions   Stool softener with stimulant (Pericolace). Follow instructions on bottle. Take Miralax if no BM for 2-3 days.     New Medications Ordered This Visit   Medications    • HYDROcodone-acetaminophen (NORCO) 5-325 MG per tablet     Sig: Take 1 tablet by mouth Every 6 (Six) Hours As Needed for Moderate Pain (4-6).     Dispense:  120 tablet     Refill:  0   • gabapentin (NEURONTIN) 300 MG capsule     Sig: TAKE ONE CAPSULE BY MOUTH EVERY MORNING AND take TWO capsules BY MOUTH AT BEDTIME     Dispense:  270 capsule     Refill:  1     Return in about 2 months (around 9/9/2021) for Annual physical, medicare wellness visit.        This document has been electronically signed by Emi Wright MD on July 9, 2021 12:09 CDT

## 2021-07-09 NOTE — PATIENT INSTRUCTIONS
Stool softener with stimulant (Pericolace). Follow instructions on bottle. Take Miralax if no BM for 2-3 days.

## 2021-07-21 DIAGNOSIS — Z78.0 POST-MENOPAUSAL: Primary | ICD-10-CM

## 2021-07-21 DIAGNOSIS — Z12.31 ENCOUNTER FOR SCREENING MAMMOGRAM FOR MALIGNANT NEOPLASM OF BREAST: Primary | ICD-10-CM

## 2021-08-05 ENCOUNTER — HOSPITAL ENCOUNTER (OUTPATIENT)
Facility: HOSPITAL | Age: 76
Setting detail: OBSERVATION
Discharge: HOME | End: 2021-08-07
Attending: EMERGENCY MEDICINE | Admitting: HOSPITALIST

## 2021-08-05 ENCOUNTER — APPOINTMENT (OUTPATIENT)
Dept: CT IMAGING | Facility: HOSPITAL | Age: 76
End: 2021-08-05

## 2021-08-05 DIAGNOSIS — K52.9 ACUTE COLITIS: Primary | ICD-10-CM

## 2021-08-05 LAB
ALBUMIN SERPL-MCNC: 4.3 G/DL (ref 3.5–5.2)
ALBUMIN/GLOB SERPL: 1.4 G/DL
ALP SERPL-CCNC: 95 U/L (ref 39–117)
ALT SERPL W P-5'-P-CCNC: 13 U/L (ref 1–33)
ANION GAP SERPL CALCULATED.3IONS-SCNC: 10 MMOL/L (ref 5–15)
AST SERPL-CCNC: 18 U/L (ref 1–32)
BACTERIA UR QL AUTO: ABNORMAL /HPF
BASOPHILS # BLD AUTO: 0.06 10*3/MM3 (ref 0–0.2)
BASOPHILS NFR BLD AUTO: 0.3 % (ref 0–1.5)
BILIRUB SERPL-MCNC: 0.9 MG/DL (ref 0–1.2)
BILIRUB UR QL STRIP: NEGATIVE
BUN SERPL-MCNC: 20 MG/DL (ref 8–23)
BUN/CREAT SERPL: 17.4 (ref 7–25)
CALCIUM SPEC-SCNC: 9.5 MG/DL (ref 8.6–10.5)
CHLORIDE SERPL-SCNC: 96 MMOL/L (ref 98–107)
CLARITY UR: CLEAR
CO2 SERPL-SCNC: 28 MMOL/L (ref 22–29)
COLOR UR: YELLOW
CREAT SERPL-MCNC: 1.15 MG/DL (ref 0.57–1)
D-LACTATE SERPL-SCNC: 1 MMOL/L (ref 0.5–2)
DEPRECATED RDW RBC AUTO: 42 FL (ref 37–54)
EOSINOPHIL # BLD AUTO: 0.02 10*3/MM3 (ref 0–0.4)
EOSINOPHIL NFR BLD AUTO: 0.1 % (ref 0.3–6.2)
ERYTHROCYTE [DISTWIDTH] IN BLOOD BY AUTOMATED COUNT: 12.5 % (ref 12.3–15.4)
GFR SERPL CREATININE-BSD FRML MDRD: 46 ML/MIN/1.73
GLOBULIN UR ELPH-MCNC: 3.1 GM/DL
GLUCOSE SERPL-MCNC: 136 MG/DL (ref 65–99)
GLUCOSE UR STRIP-MCNC: NEGATIVE MG/DL
HCT VFR BLD AUTO: 43.2 % (ref 34–46.6)
HGB BLD-MCNC: 15 G/DL (ref 12–15.9)
HGB UR QL STRIP.AUTO: ABNORMAL
HOLD SPECIMEN: NORMAL
HOLD SPECIMEN: NORMAL
HYALINE CASTS UR QL AUTO: ABNORMAL /LPF
IMM GRANULOCYTES # BLD AUTO: 0.06 10*3/MM3 (ref 0–0.05)
IMM GRANULOCYTES NFR BLD AUTO: 0.3 % (ref 0–0.5)
KETONES UR QL STRIP: ABNORMAL
LEUKOCYTE ESTERASE UR QL STRIP.AUTO: ABNORMAL
LIPASE SERPL-CCNC: 13 U/L (ref 13–60)
LYMPHOCYTES # BLD AUTO: 3.15 10*3/MM3 (ref 0.7–3.1)
LYMPHOCYTES NFR BLD AUTO: 17.6 % (ref 19.6–45.3)
MCH RBC QN AUTO: 31.8 PG (ref 26.6–33)
MCHC RBC AUTO-ENTMCNC: 34.7 G/DL (ref 31.5–35.7)
MCV RBC AUTO: 91.7 FL (ref 79–97)
MONOCYTES # BLD AUTO: 1.65 10*3/MM3 (ref 0.1–0.9)
MONOCYTES NFR BLD AUTO: 9.2 % (ref 5–12)
NEUTROPHILS NFR BLD AUTO: 12.98 10*3/MM3 (ref 1.7–7)
NEUTROPHILS NFR BLD AUTO: 72.5 % (ref 42.7–76)
NITRITE UR QL STRIP: POSITIVE
NRBC BLD AUTO-RTO: 0 /100 WBC (ref 0–0.2)
PH UR STRIP.AUTO: 6 [PH] (ref 5–9)
PLATELET # BLD AUTO: 381 10*3/MM3 (ref 140–450)
PMV BLD AUTO: 9.5 FL (ref 6–12)
POTASSIUM SERPL-SCNC: 3.7 MMOL/L (ref 3.5–5.2)
PROT SERPL-MCNC: 7.4 G/DL (ref 6–8.5)
PROT UR QL STRIP: NEGATIVE
RBC # BLD AUTO: 4.71 10*6/MM3 (ref 3.77–5.28)
RBC # UR: ABNORMAL /HPF
REF LAB TEST METHOD: ABNORMAL
SODIUM SERPL-SCNC: 134 MMOL/L (ref 136–145)
SP GR UR STRIP: 1.02 (ref 1–1.03)
SQUAMOUS #/AREA URNS HPF: ABNORMAL /HPF
UROBILINOGEN UR QL STRIP: ABNORMAL
WBC # BLD AUTO: 17.92 10*3/MM3 (ref 3.4–10.8)
WBC UR QL AUTO: ABNORMAL /HPF
WHOLE BLOOD HOLD SPECIMEN: NORMAL

## 2021-08-05 PROCEDURE — 74177 CT ABD & PELVIS W/CONTRAST: CPT

## 2021-08-05 PROCEDURE — 87040 BLOOD CULTURE FOR BACTERIA: CPT | Performed by: EMERGENCY MEDICINE

## 2021-08-05 PROCEDURE — 83690 ASSAY OF LIPASE: CPT | Performed by: EMERGENCY MEDICINE

## 2021-08-05 PROCEDURE — 85025 COMPLETE CBC W/AUTO DIFF WBC: CPT

## 2021-08-05 PROCEDURE — 86850 RBC ANTIBODY SCREEN: CPT | Performed by: EMERGENCY MEDICINE

## 2021-08-05 PROCEDURE — 96375 TX/PRO/DX INJ NEW DRUG ADDON: CPT

## 2021-08-05 PROCEDURE — 80053 COMPREHEN METABOLIC PANEL: CPT

## 2021-08-05 PROCEDURE — C9803 HOPD COVID-19 SPEC COLLECT: HCPCS

## 2021-08-05 PROCEDURE — 96361 HYDRATE IV INFUSION ADD-ON: CPT

## 2021-08-05 PROCEDURE — 86901 BLOOD TYPING SEROLOGIC RH(D): CPT | Performed by: EMERGENCY MEDICINE

## 2021-08-05 PROCEDURE — 99284 EMERGENCY DEPT VISIT MOD MDM: CPT

## 2021-08-05 PROCEDURE — 81001 URINALYSIS AUTO W/SCOPE: CPT | Performed by: EMERGENCY MEDICINE

## 2021-08-05 PROCEDURE — 25010000002 IOPAMIDOL 61 % SOLUTION: Performed by: EMERGENCY MEDICINE

## 2021-08-05 PROCEDURE — 86900 BLOOD TYPING SEROLOGIC ABO: CPT | Performed by: EMERGENCY MEDICINE

## 2021-08-05 PROCEDURE — 83605 ASSAY OF LACTIC ACID: CPT | Performed by: EMERGENCY MEDICINE

## 2021-08-05 RX ORDER — SODIUM CHLORIDE 0.9 % (FLUSH) 0.9 %
10 SYRINGE (ML) INJECTION AS NEEDED
Status: DISCONTINUED | OUTPATIENT
Start: 2021-08-05 | End: 2021-08-07 | Stop reason: HOSPADM

## 2021-08-05 RX ORDER — PANTOPRAZOLE SODIUM 40 MG/10ML
80 INJECTION, POWDER, LYOPHILIZED, FOR SOLUTION INTRAVENOUS ONCE
Status: COMPLETED | OUTPATIENT
Start: 2021-08-05 | End: 2021-08-05

## 2021-08-05 RX ADMIN — IOPAMIDOL 90 ML: 612 INJECTION, SOLUTION INTRAVENOUS at 23:03

## 2021-08-05 RX ADMIN — PANTOPRAZOLE SODIUM 80 MG: 40 INJECTION, POWDER, FOR SOLUTION INTRAVENOUS at 23:16

## 2021-08-05 RX ADMIN — SODIUM CHLORIDE 1000 ML: 9 INJECTION, SOLUTION INTRAVENOUS at 23:14

## 2021-08-06 PROBLEM — K52.9 ACUTE COLITIS: Status: ACTIVE | Noted: 2021-08-06

## 2021-08-06 LAB
ABO GROUP BLD: NORMAL
ANION GAP SERPL CALCULATED.3IONS-SCNC: 8 MMOL/L (ref 5–15)
BASOPHILS # BLD AUTO: 0.07 10*3/MM3 (ref 0–0.2)
BASOPHILS NFR BLD AUTO: 0.5 % (ref 0–1.5)
BLD GP AB SCN SERPL QL: NEGATIVE
BUN SERPL-MCNC: 14 MG/DL (ref 8–23)
BUN/CREAT SERPL: 15.2 (ref 7–25)
CALCIUM SPEC-SCNC: 8.6 MG/DL (ref 8.6–10.5)
CHLORIDE SERPL-SCNC: 102 MMOL/L (ref 98–107)
CO2 SERPL-SCNC: 27 MMOL/L (ref 22–29)
CREAT SERPL-MCNC: 0.92 MG/DL (ref 0.57–1)
DEPRECATED RDW RBC AUTO: 42.1 FL (ref 37–54)
EOSINOPHIL # BLD AUTO: 0.09 10*3/MM3 (ref 0–0.4)
EOSINOPHIL NFR BLD AUTO: 0.6 % (ref 0.3–6.2)
ERYTHROCYTE [DISTWIDTH] IN BLOOD BY AUTOMATED COUNT: 12.4 % (ref 12.3–15.4)
FLUAV SUBTYP SPEC NAA+PROBE: NOT DETECTED
FLUBV RNA ISLT QL NAA+PROBE: NOT DETECTED
GFR SERPL CREATININE-BSD FRML MDRD: 59 ML/MIN/1.73
GLUCOSE SERPL-MCNC: 104 MG/DL (ref 65–99)
HCT VFR BLD AUTO: 37 % (ref 34–46.6)
HGB BLD-MCNC: 12.4 G/DL (ref 12–15.9)
IMM GRANULOCYTES # BLD AUTO: 0.07 10*3/MM3 (ref 0–0.05)
IMM GRANULOCYTES NFR BLD AUTO: 0.5 % (ref 0–0.5)
LYMPHOCYTES # BLD AUTO: 4.02 10*3/MM3 (ref 0.7–3.1)
LYMPHOCYTES NFR BLD AUTO: 28 % (ref 19.6–45.3)
Lab: NORMAL
MCH RBC QN AUTO: 30.9 PG (ref 26.6–33)
MCHC RBC AUTO-ENTMCNC: 33.5 G/DL (ref 31.5–35.7)
MCV RBC AUTO: 92.3 FL (ref 79–97)
MONOCYTES # BLD AUTO: 1.44 10*3/MM3 (ref 0.1–0.9)
MONOCYTES NFR BLD AUTO: 10 % (ref 5–12)
NEUTROPHILS NFR BLD AUTO: 60.4 % (ref 42.7–76)
NEUTROPHILS NFR BLD AUTO: 8.69 10*3/MM3 (ref 1.7–7)
NRBC BLD AUTO-RTO: 0 /100 WBC (ref 0–0.2)
PLATELET # BLD AUTO: 336 10*3/MM3 (ref 140–450)
PMV BLD AUTO: 9.8 FL (ref 6–12)
POTASSIUM SERPL-SCNC: 3.3 MMOL/L (ref 3.5–5.2)
RBC # BLD AUTO: 4.01 10*6/MM3 (ref 3.77–5.28)
RH BLD: NEGATIVE
SARS-COV-2 RNA PNL SPEC NAA+PROBE: NOT DETECTED
SODIUM SERPL-SCNC: 137 MMOL/L (ref 136–145)
T&S EXPIRATION DATE: NORMAL
WBC # BLD AUTO: 14.38 10*3/MM3 (ref 3.4–10.8)

## 2021-08-06 PROCEDURE — 25010000002 PIPERACILLIN SOD-TAZOBACTAM PER 1 G: Performed by: EMERGENCY MEDICINE

## 2021-08-06 PROCEDURE — G0378 HOSPITAL OBSERVATION PER HR: HCPCS

## 2021-08-06 PROCEDURE — 85025 COMPLETE CBC W/AUTO DIFF WBC: CPT | Performed by: INTERNAL MEDICINE

## 2021-08-06 PROCEDURE — 96365 THER/PROPH/DIAG IV INF INIT: CPT

## 2021-08-06 PROCEDURE — 87636 SARSCOV2 & INF A&B AMP PRB: CPT | Performed by: EMERGENCY MEDICINE

## 2021-08-06 PROCEDURE — 25010000002 MORPHINE PER 10 MG: Performed by: INTERNAL MEDICINE

## 2021-08-06 PROCEDURE — 80048 BASIC METABOLIC PNL TOTAL CA: CPT | Performed by: INTERNAL MEDICINE

## 2021-08-06 PROCEDURE — 96376 TX/PRO/DX INJ SAME DRUG ADON: CPT

## 2021-08-06 PROCEDURE — 96366 THER/PROPH/DIAG IV INF ADDON: CPT

## 2021-08-06 PROCEDURE — 96375 TX/PRO/DX INJ NEW DRUG ADDON: CPT

## 2021-08-06 PROCEDURE — 36415 COLL VENOUS BLD VENIPUNCTURE: CPT | Performed by: INTERNAL MEDICINE

## 2021-08-06 RX ORDER — NALOXONE HCL 0.4 MG/ML
0.4 VIAL (ML) INJECTION
Status: DISCONTINUED | OUTPATIENT
Start: 2021-08-06 | End: 2021-08-07 | Stop reason: HOSPADM

## 2021-08-06 RX ORDER — AMITRIPTYLINE HYDROCHLORIDE 25 MG/1
25 TABLET, FILM COATED ORAL NIGHTLY
Status: DISCONTINUED | OUTPATIENT
Start: 2021-08-06 | End: 2021-08-06

## 2021-08-06 RX ORDER — ACETAMINOPHEN 325 MG/1
650 TABLET ORAL EVERY 4 HOURS PRN
Status: DISCONTINUED | OUTPATIENT
Start: 2021-08-06 | End: 2021-08-07 | Stop reason: HOSPADM

## 2021-08-06 RX ORDER — ONDANSETRON 2 MG/ML
4 INJECTION INTRAMUSCULAR; INTRAVENOUS EVERY 6 HOURS PRN
Status: DISCONTINUED | OUTPATIENT
Start: 2021-08-06 | End: 2021-08-07 | Stop reason: HOSPADM

## 2021-08-06 RX ORDER — POTASSIUM CHLORIDE 750 MG/1
40 CAPSULE, EXTENDED RELEASE ORAL AS NEEDED
Status: DISCONTINUED | OUTPATIENT
Start: 2021-08-06 | End: 2021-08-07 | Stop reason: HOSPADM

## 2021-08-06 RX ORDER — PANTOPRAZOLE SODIUM 40 MG/1
40 TABLET, DELAYED RELEASE ORAL
Status: DISCONTINUED | OUTPATIENT
Start: 2021-08-06 | End: 2021-08-07 | Stop reason: HOSPADM

## 2021-08-06 RX ORDER — METRONIDAZOLE 500 MG/1
500 TABLET ORAL EVERY 8 HOURS SCHEDULED
Status: DISCONTINUED | OUTPATIENT
Start: 2021-08-06 | End: 2021-08-07 | Stop reason: HOSPADM

## 2021-08-06 RX ORDER — METRONIDAZOLE 500 MG/1
500 TABLET ORAL EVERY 8 HOURS SCHEDULED
Status: DISCONTINUED | OUTPATIENT
Start: 2021-08-06 | End: 2021-08-06

## 2021-08-06 RX ORDER — AMITRIPTYLINE HYDROCHLORIDE 25 MG/1
25 TABLET, FILM COATED ORAL NIGHTLY
Status: DISCONTINUED | OUTPATIENT
Start: 2021-08-06 | End: 2021-08-07 | Stop reason: HOSPADM

## 2021-08-06 RX ORDER — SODIUM CHLORIDE 9 MG/ML
100 INJECTION, SOLUTION INTRAVENOUS CONTINUOUS
Status: DISCONTINUED | OUTPATIENT
Start: 2021-08-06 | End: 2021-08-07 | Stop reason: HOSPADM

## 2021-08-06 RX ORDER — SODIUM CHLORIDE 0.9 % (FLUSH) 0.9 %
10 SYRINGE (ML) INJECTION EVERY 12 HOURS SCHEDULED
Status: DISCONTINUED | OUTPATIENT
Start: 2021-08-06 | End: 2021-08-07 | Stop reason: HOSPADM

## 2021-08-06 RX ORDER — MORPHINE SULFATE 2 MG/ML
1 INJECTION, SOLUTION INTRAMUSCULAR; INTRAVENOUS EVERY 4 HOURS PRN
Status: DISCONTINUED | OUTPATIENT
Start: 2021-08-06 | End: 2021-08-07 | Stop reason: HOSPADM

## 2021-08-06 RX ORDER — AMLODIPINE BESYLATE 5 MG/1
5 TABLET ORAL DAILY
Status: DISCONTINUED | OUTPATIENT
Start: 2021-08-06 | End: 2021-08-07 | Stop reason: HOSPADM

## 2021-08-06 RX ORDER — POTASSIUM CHLORIDE 1.5 G/1.77G
40 POWDER, FOR SOLUTION ORAL AS NEEDED
Status: DISCONTINUED | OUTPATIENT
Start: 2021-08-06 | End: 2021-08-07 | Stop reason: HOSPADM

## 2021-08-06 RX ORDER — LISINOPRIL 20 MG/1
20 TABLET ORAL DAILY
Status: DISCONTINUED | OUTPATIENT
Start: 2021-08-06 | End: 2021-08-07 | Stop reason: HOSPADM

## 2021-08-06 RX ORDER — SODIUM CHLORIDE 0.9 % (FLUSH) 0.9 %
10 SYRINGE (ML) INJECTION AS NEEDED
Status: DISCONTINUED | OUTPATIENT
Start: 2021-08-06 | End: 2021-08-07 | Stop reason: HOSPADM

## 2021-08-06 RX ORDER — POTASSIUM CHLORIDE 7.45 MG/ML
10 INJECTION INTRAVENOUS
Status: DISCONTINUED | OUTPATIENT
Start: 2021-08-06 | End: 2021-08-07 | Stop reason: HOSPADM

## 2021-08-06 RX ADMIN — MORPHINE SULFATE 1 MG: 2 INJECTION, SOLUTION INTRAMUSCULAR; INTRAVENOUS at 18:15

## 2021-08-06 RX ADMIN — METRONIDAZOLE 500 MG: 500 TABLET ORAL at 22:07

## 2021-08-06 RX ADMIN — AMITRIPTYLINE HYDROCHLORIDE 25 MG: 25 TABLET, FILM COATED ORAL at 21:35

## 2021-08-06 RX ADMIN — POTASSIUM CHLORIDE 40 MEQ: 750 CAPSULE, EXTENDED RELEASE ORAL at 22:07

## 2021-08-06 RX ADMIN — PIPERACILLIN SODIUM AND TAZOBACTAM SODIUM 3.38 G: 3; .375 INJECTION, POWDER, LYOPHILIZED, FOR SOLUTION INTRAVENOUS at 00:37

## 2021-08-06 RX ADMIN — SODIUM CHLORIDE 100 ML/HR: 9 INJECTION, SOLUTION INTRAVENOUS at 02:59

## 2021-08-06 RX ADMIN — POTASSIUM CHLORIDE 40 MEQ: 750 CAPSULE, EXTENDED RELEASE ORAL at 18:08

## 2021-08-06 RX ADMIN — LISINOPRIL 20 MG: 20 TABLET ORAL at 08:22

## 2021-08-06 RX ADMIN — SODIUM CHLORIDE, PRESERVATIVE FREE 10 ML: 5 INJECTION INTRAVENOUS at 21:36

## 2021-08-06 RX ADMIN — AMITRIPTYLINE HYDROCHLORIDE 25 MG: 25 TABLET, FILM COATED ORAL at 03:23

## 2021-08-06 RX ADMIN — ACETAMINOPHEN 650 MG: 325 TABLET, FILM COATED ORAL at 18:17

## 2021-08-06 RX ADMIN — METRONIDAZOLE 500 MG: 500 TABLET ORAL at 02:55

## 2021-08-06 RX ADMIN — MORPHINE SULFATE 1 MG: 2 INJECTION, SOLUTION INTRAMUSCULAR; INTRAVENOUS at 13:46

## 2021-08-06 RX ADMIN — METRONIDAZOLE 500 MG: 500 TABLET ORAL at 11:20

## 2021-08-06 RX ADMIN — AMLODIPINE BESYLATE 5 MG: 5 TABLET ORAL at 02:55

## 2021-08-06 RX ADMIN — SODIUM CHLORIDE, PRESERVATIVE FREE 10 ML: 5 INJECTION INTRAVENOUS at 02:59

## 2021-08-06 RX ADMIN — PANTOPRAZOLE SODIUM 40 MG: 40 TABLET, DELAYED RELEASE ORAL at 08:22

## 2021-08-06 RX ADMIN — MORPHINE SULFATE 1 MG: 2 INJECTION, SOLUTION INTRAMUSCULAR; INTRAVENOUS at 02:54

## 2021-08-06 NOTE — H&P
[facility] Medicine Admission      Date of Admission: 8/5/2021      Primary Care Physician: Emi Wright MD      Chief Complaint: Abdominal pain    HPI: 76-year-old female with a past medical history of hypertension, gastroesophageal reflux disease who presents to the hospital complaining of abdominal pain, bloody diarrhea for the last 1 day.  Her symptoms started yesterday afternoon.  She had abdominal pain followed by diarrhea with blood.  No fevers.  No nausea or vomiting.    Concurrent Medical History:  has a past medical history of Abrasion, Acute bronchitis, Acute laryngitis, Acute maxillary sinusitis, Acute pharyngitis, Acute serous otitis media, Acute sinusitis, Acute urinary tract infection, Ankle edema, Aortic valve regurgitation, Astigmatism, Breast cancer (CMS/HCC), Bunion, Chest pain, Chest pain, Contusion of foot, Cough, Diastolic dysfunction, Dyspnea, Encounter for general adult medical examination without abnormal findings, Encounter for routine adult health examination, Essential hypertension, Gastroesophageal reflux disease, Hammer toe, Heart murmur, Hip pain, Hyperlipidemia, Low back pain, Malaise and fatigue, Mammogram abnormal, Menopause, Myopia, Need for immunization against influenza, Need for prophylactic vaccination against Streptococcus pneumoniae (pneumococcus), Nuclear cataract, Osteoarthritis, Palpitations, Plantar fasciitis, Posterior subcapsular polar senile cataract, Primary fibromyalgia syndrome, Sciatica, Screening for osteoporosis, Screening mammogram, encounter for, Shoulder pain, Tear film insufficiency, Trochanteric bursitis, Upper respiratory infection, Urinary tract infectious disease, and Vitamin D deficiency.    Past Surgical History:  has a past surgical history that includes Hip Arthroplasty (09/12/2011); Cardiac catheterization (10/01/2010); Cervical spine surgery (06/09/1993); Esophagogastroduodenoscopy (10/07/2015);  Other surgical history (06/22/2010); Injection of Medication (10/01/2014); Knee surgery (02/03/2016); Neck surgery; Pap Smear (12/10/2008); Injection of Medication (09/03/2012); Shoulder surgery (06/17/2013); Breast biopsy (02/10/2011); Total abdominal hysterectomy w/ bilateral salpingoophorectomy; Nose surgery (11/18/1978); Breast lumpectomy; Total knee arthroplasty (Left, 02/03/2016); and Bunionectomy (Left, 9/11/2019).    Family History: family history includes Arthritis in her mother; Cancer in her maternal grandfather, maternal grandmother, and another family member; Colon cancer in her maternal grandmother; Coronary artery disease in an other family member; Heart disease in her mother; Hyperlipidemia in her mother; Hypertension in her mother and another family member.     Social History:  reports that she has never smoked. She has never used smokeless tobacco. She reports that she does not drink alcohol and does not use drugs.    Allergies: No Known Allergies    Medications:   Prior to Admission medications    Medication Sig Start Date End Date Taking? Authorizing Provider   amitriptyline (ELAVIL) 25 MG tablet Take 1 tablet by mouth Every Night. 12/16/20   Emi Wright MD   amLODIPine (NORVASC) 5 MG tablet Take 1 tablet by mouth Daily. 4/9/21   Emi Wright MD   calcium carbonate-vitamin d (CALTRATE 600+D) 600-400 MG-UNIT per tablet Take 1 tablet by mouth daily.    Provider, MD Brittany   gabapentin (NEURONTIN) 300 MG capsule TAKE ONE CAPSULE BY MOUTH EVERY MORNING AND take TWO capsules BY MOUTH AT BEDTIME 7/9/21   Emi Wright MD   HYDROcodone-acetaminophen (NORCO) 5-325 MG per tablet Take 1 tablet by mouth Every 6 (Six) Hours As Needed for Moderate Pain (4-6). 7/9/21   Emi Wright MD   lisinopril (PRINIVIL,ZESTRIL) 20 MG tablet Take 1 tablet by mouth Daily. 2/19/21   Emi Wright MD   metroNIDAZOLE (METROCREAM) 0.75 % cream Apply  topically to the appropriate area as  directed 2 (Two) Times a Day. 2/19/21   Emi Wright MD       Review of Systems:  Review of Systems   10 point review of system was obtained.  Please see HPI for positives.    Physical Exam:   Temp:  [98.6 °F (37 °C)] 98.6 °F (37 °C)  Heart Rate:  [76-84] 76  Resp:  [18] 18  BP: (166-177)/(82-91) 166/91  Physical Exam  Constitutional:       General: She is not in acute distress.  HENT:      Head: Normocephalic and atraumatic.   Eyes:      Extraocular Movements: Extraocular movements intact.   Cardiovascular:      Rate and Rhythm: Normal rate and regular rhythm.   Pulmonary:      Effort: Pulmonary effort is normal. No respiratory distress.   Abdominal:      General: Bowel sounds are normal. There is no distension.      Palpations: Abdomen is soft.      Tenderness: There is no abdominal tenderness.   Musculoskeletal:         General: No swelling.   Neurological:      General: No focal deficit present.      Mental Status: She is alert.           Results Reviewed:  I have personally reviewed current lab, radiology, and data and agree with results.  Lab Results (last 24 hours)     Procedure Component Value Units Date/Time    Blood Culture - Blood, Hand, Right [[id number]] Collected: 08/05/21 2329    Specimen: Blood from Hand, Right Updated: 08/05/21 2333    Lactic Acid, Plasma [[id number]]  (Normal) Collected: 08/05/21 2238    Specimen: Blood Updated: 08/05/21 2310     Lactate 1.0 mmol/L     Urinalysis, Microscopic Only - Urine, Clean Catch [[id number]]  (Abnormal) Collected: 08/05/21 2245    Specimen: Urine, Clean Catch Updated: 08/05/21 2305     RBC, UA 3-5 /HPF      WBC, UA 6-12 /HPF      Bacteria, UA 4+ /HPF      Squamous Epithelial Cells, UA None Seen /HPF      Hyaline Casts, UA 0-2 /LPF      Methodology Automated Microscopy    Urinalysis With Microscopic If Indicated (No Culture) - Urine, Clean Catch [[id number]]  (Abnormal) Collected: 08/05/21 2245    Specimen: Urine, Clean Catch Updated: 08/05/21 2305      Color, UA Yellow     Appearance, UA Clear     pH, UA 6.0     Specific Gravity, UA 1.018     Glucose, UA Negative     Ketones, UA 15 mg/dL (1+)     Bilirubin, UA Negative     Blood, UA Small (1+)     Protein, UA Negative     Leuk Esterase, UA Small (1+)     Nitrite, UA Positive     Urobilinogen, UA 0.2 E.U./dL    Blood Culture - Blood, Arm, Right [[id number]] Collected: 08/05/21 2238    Specimen: Blood from Arm, Right Updated: 08/05/21 2252    Lipase [[id number]]  (Normal) Collected: 08/05/21 1846    Specimen: Blood Updated: 08/05/21 2211     Lipase 13 U/L     [place] Draw [[id number]] Collected: 08/05/21 1846    Specimen: Blood Updated: 08/05/21 2000    Narrative:      The following orders were created for panel order [place] Draw.  Procedure                               Abnormality         Status                     ---------                               -----------         ------                     Green Top (Gel)[[id number]]                                  Final result               Lavender Top[[id number]]                                     Final result               Gold Top - SST[[id number]]                                   Final result                 Please view results for these tests on the individual orders.    Green Top (Gel) [[id number]] Collected: 08/05/21 1846    Specimen: Blood Updated: 08/05/21 2000     Extra Tube Hold for add-ons.     Comment: Auto resulted.       Lavender Top [[id number]] Collected: 08/05/21 1846    Specimen: Blood Updated: 08/05/21 2000     Extra Tube hold for add-on     Comment: Auto resulted       Gold Top - SST [[id number]] Collected: 08/05/21 1846    Specimen: Blood Updated: 08/05/21 2000     Extra Tube Hold for add-ons.     Comment: Auto resulted.       CBC & Differential [[id number]]  (Abnormal) Collected: 08/05/21 1846    Specimen: Blood Updated: 08/05/21 1918    Narrative:      The following orders were created for panel order CBC & Differential.  Procedure                                Abnormality         Status                     ---------                               -----------         ------                     CBC Auto Differential[[id number]]        Abnormal            Final result                 Please view results for these tests on the individual orders.    CBC Auto Differential [[id number]]  (Abnormal) Collected: 08/05/21 1846    Specimen: Blood Updated: 08/05/21 1918     WBC 17.92 10*3/mm3      RBC 4.71 10*6/mm3      Hemoglobin 15.0 g/dL      Hematocrit 43.2 %      MCV 91.7 fL      MCH 31.8 pg      MCHC 34.7 g/dL      RDW 12.5 %      RDW-SD 42.0 fl      MPV 9.5 fL      Platelets 381 10*3/mm3      Neutrophil % 72.5 %      Lymphocyte % 17.6 %      Monocyte % 9.2 %      Eosinophil % 0.1 %      Basophil % 0.3 %      Immature Grans % 0.3 %      Neutrophils, Absolute 12.98 10*3/mm3      Lymphocytes, Absolute 3.15 10*3/mm3      Monocytes, Absolute 1.65 10*3/mm3      Eosinophils, Absolute 0.02 10*3/mm3      Basophils, Absolute 0.06 10*3/mm3      Immature Grans, Absolute 0.06 10*3/mm3      nRBC 0.0 /100 WBC     Comprehensive Metabolic Panel [[id number]]  (Abnormal) Collected: 08/05/21 1846    Specimen: Blood Updated: 08/05/21 1913     Glucose 136 mg/dL      BUN 20 mg/dL      Creatinine 1.15 mg/dL      Sodium 134 mmol/L      Potassium 3.7 mmol/L      Chloride 96 mmol/L      CO2 28.0 mmol/L      Calcium 9.5 mg/dL      Total Protein 7.4 g/dL      Albumin 4.30 g/dL      ALT (SGPT) 13 U/L      AST (SGOT) 18 U/L      Alkaline Phosphatase 95 U/L      Total Bilirubin 0.9 mg/dL      eGFR Non African Amer 46 mL/min/1.73      Globulin 3.1 gm/dL      A/G Ratio 1.4 g/dL      BUN/Creatinine Ratio 17.4     Anion Gap 10.0 mmol/L     Narrative:      GFR Normal >60  Chronic Kidney Disease <60  Kidney Failure <15          Imaging Results (Last 24 Hours)     Procedure Component Value Units Date/Time    CT Abdomen Pelvis With Contrast [[id number]] Collected: 08/05/21 2300     Updated: 08/05/21 2333     Narrative:      CT ABDOMEN PELVIS WITH IV CONTRAST    INDICATION: 76 years Female; abdominal pain    TECHNIQUE:  CT scan of the abdomen and pelvis was performed with  IV contrast.  This exam was performed according to our  departmental dose-optimization program, which includes automated  exposure control, adjustment of the mA and/or kV according to  patient size and/or use of iterative reconstruction technique.     COMPARISON: None.    FINDINGS:  Liver: Unremarkable.  Gallbladder/Biliary tree: Unremarkable.  Pancreas: Unremarkable.  Spleen: Punctate calcifications in the spleen probably represent  prior granulomatous infection.  Adrenals: Unremarkable.  Genitourinary: Small nonenhancing cyst in the mid right kidney is  likely benign. No hydronephrosis or nephrolithiasis. No bladder  wall thickening. Status post hysterectomy. No adnexal masses.  Small amount of free fluid in the right adnexa.  Gastrointestinal: Circumferential wall thickening with prominent  submucosal edema and surrounding fat stranding is involving the  splenic flexure and descending colon. Diverticulosis of the  sigmoid without diverticulitis. No gastric wall thickening. No  small bowel wall thickening or obstruction. No free air.  Peritoneum: Unremarkable.  Vasculature: Mild atherosclerosis of the aorta and iliac  arteries. The celiac artery and SMA and MAZIN are patent.   Lymph nodes: No pathologically enlarged lymph nodes.  Bones: Normal alignment of the right total hip prosthetic.  Degenerative disc disease at L2-3, L3-4, and L4-5.  Anterolisthesis at L4-5.  Soft tissues: Unremarkable.  Incidental findings: None.      Impression:      1.  Circumferential wall thickening with prominent submucosal  edema and surrounding fat stranding is involving the splenic  flexure and descending colon. Differential diagnosis includes  infectious colitis or inflammatory bowel disease. Ischemic  colitis cannot be excluded.  2.  Diverticulosis of the sigmoid  without diverticulitis.  3.  Small amount of free fluid in the right adnexa.    Electronically signed by:  Bin Pitt  8/5/2021 11:32 PM CDT  Workstation: [id number]            Assessment:    Active Hospital Problems    Diagnosis    • Acute colitis              Plan:    Abdominal pain/bloody stools.  Associated with leukocytosis.  Afebrile.  CT of the abdomen shows infectious versus inflammatory colitis.  We will put the patient on Rocephin and Flagyl.  We will put her on clear liquid diet.    Hypertension.  We will continue her on lisinopril and amlodipine.    DVT prophylaxis.  We will put her on SCDs.        Peter Medina MD

## 2021-08-06 NOTE — PROGRESS NOTES
[facility] Medicine Services  INPATIENT PROGRESS NOTE    Length of Stay: 0  Date of Admission: 8/5/2021  Primary Care Physician: Emi Wright MD    Subjective   Chief Complaint: Abdominal pain  HPI: Patient states that she is still having abdominal pain.  She has continued rectal bleeding.    Review of Systems   Constitutional: Negative for appetite change, chills, fatigue, fever and unexpected weight change.   Respiratory: Negative for cough, choking, chest tightness, shortness of breath and wheezing.    Cardiovascular: Negative for chest pain, palpitations and leg swelling.   Gastrointestinal: Positive for abdominal pain and blood in stool. Negative for constipation, diarrhea, nausea and vomiting.   Genitourinary: Negative for dysuria, flank pain and hematuria.   Neurological: Negative for dizziness, seizures, syncope, speech difficulty, weakness, light-headedness, numbness and headaches.   Hematological: Does not bruise/bleed easily.        All pertinent negatives and positives are as above. All other systems have been reviewed and are negative unless otherwise stated.     Objective    Temp:  [98.4 °F (36.9 °C)-98.6 °F (37 °C)] 98.4 °F (36.9 °C)  Heart Rate:  [68-88] 73  Resp:  [18] 18  BP: (150-177)/(70-91) 166/75    Physical Exam  Vitals reviewed.   Constitutional:       Appearance: She is well-developed.   HENT:      Head: Normocephalic and atraumatic.   Eyes:      Pupils: Pupils are equal, round, and reactive to light.   Cardiovascular:      Rate and Rhythm: Normal rate and regular rhythm.      Heart sounds: Normal heart sounds. No murmur heard.   No friction rub. No gallop.    Pulmonary:      Effort: Pulmonary effort is normal. No respiratory distress.      Breath sounds: Normal breath sounds. No wheezing or rales.   Chest:      Chest wall: No tenderness.   Abdominal:      General: Bowel sounds are normal. There is no distension.      Palpations: Abdomen is  soft.      Tenderness: There is no abdominal tenderness. There is no guarding.   Musculoskeletal:      Cervical back: Normal range of motion and neck supple.   Skin:     General: Skin is warm and dry.   Psychiatric:         Behavior: Behavior normal.         Thought Content: Thought content normal.       Results Review:  I have reviewed the labs, radiology results, and diagnostic studies.    Laboratory Data:   Results from last 7 days   Lab Units 08/06/21  0651 08/05/21  1846   SODIUM mmol/L 137 134*   POTASSIUM mmol/L 3.3* 3.7   CHLORIDE mmol/L 102 96*   CO2 mmol/L 27.0 28.0   BUN mg/dL 14 20   CREATININE mg/dL 0.92 1.15*   GLUCOSE mg/dL 104* 136*   CALCIUM mg/dL 8.6 9.5   BILIRUBIN mg/dL  --  0.9   ALK PHOS U/L  --  95   ALT (SGPT) U/L  --  13   AST (SGOT) U/L  --  18   ANION GAP mmol/L 8.0 10.0     Estimated Creatinine Clearance: 54.9 mL/min (by C-G formula based on SCr of 0.92 mg/dL).          Results from last 7 days   Lab Units 08/06/21  0651 08/05/21  1846   WBC 10*3/mm3 14.38* 17.92*   HEMOGLOBIN g/dL 12.4 15.0   HEMATOCRIT % 37.0 43.2   PLATELETS 10*3/mm3 336 381           Culture Data:   No results found for: BLOODCX  No results found for: URINECX  No results found for: RESPCX  No results found for: WOUNDCX  No results found for: STOOLCX  No components found for: BODYFLD    Radiology Data:   Imaging Results (Last 24 Hours)     Procedure Component Value Units Date/Time    CT Abdomen Pelvis With Contrast [[id number]] Collected: 08/05/21 2300     Updated: 08/05/21 2333    Narrative:      CT ABDOMEN PELVIS WITH IV CONTRAST    INDICATION: 76 years Female; abdominal pain    TECHNIQUE:  CT scan of the abdomen and pelvis was performed with  IV contrast.  This exam was performed according to our  departmental dose-optimization program, which includes automated  exposure control, adjustment of the mA and/or kV according to  patient size and/or use of iterative reconstruction technique.     COMPARISON:  None.    FINDINGS:  Liver: Unremarkable.  Gallbladder/Biliary tree: Unremarkable.  Pancreas: Unremarkable.  Spleen: Punctate calcifications in the spleen probably represent  prior granulomatous infection.  Adrenals: Unremarkable.  Genitourinary: Small nonenhancing cyst in the mid right kidney is  likely benign. No hydronephrosis or nephrolithiasis. No bladder  wall thickening. Status post hysterectomy. No adnexal masses.  Small amount of free fluid in the right adnexa.  Gastrointestinal: Circumferential wall thickening with prominent  submucosal edema and surrounding fat stranding is involving the  splenic flexure and descending colon. Diverticulosis of the  sigmoid without diverticulitis. No gastric wall thickening. No  small bowel wall thickening or obstruction. No free air.  Peritoneum: Unremarkable.  Vasculature: Mild atherosclerosis of the aorta and iliac  arteries. The celiac artery and SMA and MAZIN are patent.   Lymph nodes: No pathologically enlarged lymph nodes.  Bones: Normal alignment of the right total hip prosthetic.  Degenerative disc disease at L2-3, L3-4, and L4-5.  Anterolisthesis at L4-5.  Soft tissues: Unremarkable.  Incidental findings: None.      Impression:      1.  Circumferential wall thickening with prominent submucosal  edema and surrounding fat stranding is involving the splenic  flexure and descending colon. Differential diagnosis includes  infectious colitis or inflammatory bowel disease. Ischemic  colitis cannot be excluded.  2.  Diverticulosis of the sigmoid without diverticulitis.  3.  Small amount of free fluid in the right adnexa.    Electronically signed by:  Bin iPtt  8/5/2021 11:32 PM CDT  Workstation: [id number]          I have reviewed the patient's current medications.     Assessment/Plan     Active Hospital Problems    Diagnosis    • Acute colitis        Plan:  1.  Acute colitis: CT scan shows thickening at the splenic flexure.  Continue antibiotics, IV fluids, IV analgesics,  IV antiemetics, and clear liquid diet.  Most likely this is infectious in etiology.  Patient has no history of vascular disease.  No history of inflammatory bowel disease.  2.  Hypertension, continue home medication.  3.  Degenerative joint disease/degenerative processes: Continue home pain medication.  4.  DVT prophylaxis: SCDs.          The patient was evaluated during the global COVID-19 pandemic, and the diagnosis was suspected/considered upon their initial presentation.  Evaluation, treatment, and testing were consistent with current guidelines for patients who present with complaints or symptoms that may be related to COVID-19.    I confirmed that the patient's Advance Care Plan is present, code status is documented, or surrogate decision maker is listed in the patient's medical record.       Discharge Planning: I expect patient to be discharged to home in 1-2 days.        This document has been electronically signed by Fernando Perkins MD on August 6, 2021 14:39 CDT

## 2021-08-06 NOTE — ED PROVIDER NOTES
Subjective   76-year-old white female presents to the emergency department chief complaint of abdominal pain.  Patient complains of abdominal pain with nausea, vomiting, diarrhea, and bloody stool since yesterday.  Patient describes the pain as a dull ache.  She rates the pain as 5 out of 10 severity.  Patient declines analgesics.          Review of Systems   Constitutional: Negative for chills, diaphoresis and fever.   Respiratory: Negative for cough and shortness of breath.    Cardiovascular: Negative for chest pain.   Gastrointestinal: Positive for abdominal pain, blood in stool, diarrhea, nausea and vomiting.   Genitourinary: Negative for dysuria and hematuria.   Musculoskeletal: Negative for back pain and neck pain.   Neurological: Negative for syncope, weakness and headaches.   All other systems reviewed and are negative.      Past Medical History:   Diagnosis Date   • Abrasion     Abrasion and/or friction burn      • Acute bronchitis    • Acute laryngitis     probably viral   • Acute maxillary sinusitis    • Acute pharyngitis    • Acute serous otitis media    • Acute sinusitis    • Acute urinary tract infection    • Ankle edema    • Aortic valve regurgitation    • Astigmatism    • Breast cancer (CMS/HCC)    • Bunion    • Chest pain    • Chest pain    • Contusion of foot    • Cough    • Diastolic dysfunction    • Dyspnea    • Encounter for general adult medical examination without abnormal findings    • Encounter for routine adult health examination    • Essential hypertension    • Gastroesophageal reflux disease    • Hammer toe    • Heart murmur    • Hip pain    • Hyperlipidemia    • Low back pain    • Malaise and fatigue    • Mammogram abnormal    • Menopause    • Myopia    • Need for immunization against influenza    • Need for prophylactic vaccination against Streptococcus pneumoniae (pneumococcus)    • Nuclear cataract    • Osteoarthritis    • Palpitations    • Plantar fasciitis    • Posterior subcapsular  polar senile cataract    • Primary fibromyalgia syndrome    • Sciatica    • Screening for osteoporosis    • Screening mammogram, encounter for    • Shoulder pain    • Tear film insufficiency    • Trochanteric bursitis    • Upper respiratory infection    • Urinary tract infectious disease    • Vitamin D deficiency        No Known Allergies    Past Surgical History:   Procedure Laterality Date   • BREAST BIOPSY  02/10/2011    Stereotactic breast biopsy (3)    Sterotactic location guide localization of the abnormality with wire placement. Left lumpectomy. Faxigram.    • BREAST LUMPECTOMY     • BUNIONECTOMY Left 9/11/2019    Procedure: Reverdin bunionectomy, second hammertoe correction,  weil osteotomy                     c-arm;  Surgeon: Williams Perry DPM;  Location: [facility] OR;  Service: Podiatry   • CARDIAC CATHETERIZATION  10/01/2010    Cardiac cath 25251 (1)    Minimal plaquing in the first diagonal branch with evidence of good POORNIMA 3 flow. Preserved left ventricular systolic function with ejection fraction of 55%.    • CERVICAL SPINE SURGERY  06/09/1993    Cervical spine disk surgery (1)    C5-C6 anterior cervical discectomy and fusion with iliac bone graft.    • ENDOSCOPY  10/07/2015    Colon endoscopy 57179 (2)    negative cologard    • HIP ARTHROPLASTY  09/12/2011    Anesth, hip joint procedure (1)    Right total hip arthroplasty with adductor tenotomy. Osteoarthritis of the right hip.    • INJECTION OF MEDICATION  10/01/2014    Kenalog (4)      • INJECTION OF MEDICATION  09/03/2012    Rocephin (1)      • KNEE SURGERY  02/03/2016    Knee Surgery (1)    Left total knee arthroplasty.    • NECK SURGERY      Neck spinal fusion (1)      • NOSE SURGERY  11/18/1978    Treat nasal (nose) fracture (1) Nasal fracture reduction with splint fixation.    • OTHER SURGICAL HISTORY  06/22/2010    Correction of bunion (1)    Hallux valgus deformity of right foot. Base wedge bunionectomy of right foot.    • PAP SMEAR   12/10/2008   • SHOULDER SURGERY  06/17/2013    Shoulder arthroscopy/surgery (2)    Arthroscopy of the right shoulder with rotator cuff repair.    • TOTAL ABDOMINAL HYSTERECTOMY WITH SALPINGO OOPHORECTOMY     • TOTAL KNEE ARTHROPLASTY Left 02/03/2016       Family History   Problem Relation Age of Onset   • Coronary artery disease Other    • Hypertension Other    • Cancer Other         lung, stomach, bladder   • Arthritis Mother    • Hypertension Mother    • Heart disease Mother    • Hyperlipidemia Mother    • Cancer Maternal Grandmother    • Colon cancer Maternal Grandmother    • Cancer Maternal Grandfather        Social History     Socioeconomic History   • Marital status:      Spouse name: Not on file   • Number of children: Not on file   • Years of education: Not on file   • Highest education level: Not on file   Tobacco Use   • Smoking status: Never Smoker   • Smokeless tobacco: Never Used   Substance and Sexual Activity   • Alcohol use: No   • Drug use: No   • Sexual activity: Defer           Objective   Physical Exam  Vitals and nursing note reviewed.   Constitutional:       General: She is not in acute distress.     Appearance: She is not toxic-appearing or diaphoretic.   HENT:      Head: Normocephalic and atraumatic.      Right Ear: External ear normal.      Left Ear: External ear normal.      Nose: Nose normal.      Mouth/Throat:      Mouth: Mucous membranes are moist.      Pharynx: Oropharynx is clear.   Eyes:      Extraocular Movements: Extraocular movements intact.      Conjunctiva/sclera: Conjunctivae normal.      Pupils: Pupils are equal, round, and reactive to light.   Cardiovascular:      Rate and Rhythm: Normal rate and regular rhythm.      Pulses: Normal pulses.      Heart sounds: Normal heart sounds.   Pulmonary:      Effort: Pulmonary effort is normal.      Breath sounds: Normal breath sounds.   Abdominal:      General: Bowel sounds are normal. There is no distension.      Palpations:  Abdomen is soft. There is no mass.      Tenderness: There is no abdominal tenderness. There is no guarding.   Musculoskeletal:      Cervical back: Normal range of motion and neck supple.      Right lower leg: No edema.      Left lower leg: No edema.   Skin:     General: Skin is warm and dry.   Neurological:      General: No focal deficit present.      Mental Status: She is alert and oriented to person, place, and time.   Psychiatric:         Mood and Affect: Mood normal.         Behavior: Behavior normal.         Procedures           ED Course  ED Course as of Aug 06 0003   Fri Aug 06, 2021   0001 Patient is alert and in no acute distress. I reviewed the results of her evaluation with her and recommended admission for IV antibiotics and observation. I paged the hospitalist.    [DR]   0002 Case discussed with the hospitalist. He agrees to admit the patient to the medical floor.    []      ED Course User Index  [DR] Allan Barrera MD            Labs Reviewed   COMPREHENSIVE METABOLIC PANEL - Abnormal; Notable for the following components:       Result Value    Glucose 136 (*)     Creatinine 1.15 (*)     Sodium 134 (*)     Chloride 96 (*)     eGFR Non  Amer 46 (*)     All other components within normal limits    Narrative:     GFR Normal >60  Chronic Kidney Disease <60  Kidney Failure <15     CBC WITH AUTO DIFFERENTIAL - Abnormal; Notable for the following components:    WBC 17.92 (*)     Lymphocyte % 17.6 (*)     Eosinophil % 0.1 (*)     Neutrophils, Absolute 12.98 (*)     Lymphocytes, Absolute 3.15 (*)     Monocytes, Absolute 1.65 (*)     Immature Grans, Absolute 0.06 (*)     All other components within normal limits   URINALYSIS W/ MICROSCOPIC IF INDICATED (NO CULTURE) - Abnormal; Notable for the following components:    Ketones, UA 15 mg/dL (1+) (*)     Blood, UA Small (1+) (*)     Leuk Esterase, UA Small (1+) (*)     Nitrite, UA Positive (*)     All other components within normal limits   URINALYSIS,  MICROSCOPIC ONLY - Abnormal; Notable for the following components:    RBC, UA 3-5 (*)     WBC, UA 6-12 (*)     Bacteria, UA 4+ (*)     All other components within normal limits   LACTIC ACID, PLASMA - Normal   LIPASE - Normal   BLOOD CULTURE   BLOOD CULTURE   RAINBOW DRAW    Narrative:     The following orders were created for panel order [place] Draw.  Procedure                               Abnormality         Status                     ---------                               -----------         ------                     Green Top (Gel)[[id number]]                                  Final result               Lavender Top[[id number]]                                     Final result               Gold Top - SST[[id number]]                                   Final result                 Please view results for these tests on the individual orders.   TYPE AND SCREEN   PREVIOUS HISTORY   CBC AND DIFFERENTIAL    Narrative:     The following orders were created for panel order CBC & Differential.  Procedure                               Abnormality         Status                     ---------                               -----------         ------                     CBC Auto Differential[[id number]]        Abnormal            Final result                 Please view results for these tests on the individual orders.   GREEN TOP   LAVENDER TOP   GOLD TOP - SST     CT Abdomen Pelvis With Contrast    Result Date: 8/5/2021  Narrative: CT ABDOMEN PELVIS WITH IV CONTRAST INDICATION: 76 years Female; abdominal pain TECHNIQUE:  CT scan of the abdomen and pelvis was performed with IV contrast.  This exam was performed according to our departmental dose-optimization program, which includes automated exposure control, adjustment of the mA and/or kV according to patient size and/or use of iterative reconstruction technique. COMPARISON: None. FINDINGS: Liver: Unremarkable. Gallbladder/Biliary tree: Unremarkable. Pancreas: Unremarkable.  Spleen: Punctate calcifications in the spleen probably represent prior granulomatous infection. Adrenals: Unremarkable. Genitourinary: Small nonenhancing cyst in the mid right kidney is likely benign. No hydronephrosis or nephrolithiasis. No bladder wall thickening. Status post hysterectomy. No adnexal masses. Small amount of free fluid in the right adnexa. Gastrointestinal: Circumferential wall thickening with prominent submucosal edema and surrounding fat stranding is involving the splenic flexure and descending colon. Diverticulosis of the sigmoid without diverticulitis. No gastric wall thickening. No small bowel wall thickening or obstruction. No free air. Peritoneum: Unremarkable. Vasculature: Mild atherosclerosis of the aorta and iliac arteries. The celiac artery and SMA and MAZIN are patent. Lymph nodes: No pathologically enlarged lymph nodes. Bones: Normal alignment of the right total hip prosthetic. Degenerative disc disease at L2-3, L3-4, and L4-5. Anterolisthesis at L4-5. Soft tissues: Unremarkable. Incidental findings: None.     Impression: 1.  Circumferential wall thickening with prominent submucosal edema and surrounding fat stranding is involving the splenic flexure and descending colon. Differential diagnosis includes infectious colitis or inflammatory bowel disease. Ischemic colitis cannot be excluded. 2.  Diverticulosis of the sigmoid without diverticulitis. 3.  Small amount of free fluid in the right adnexa. Electronically signed by:  Bin Pitt  8/5/2021 11:32 PM CDT Workstation: [id number]                                    [facility]    Final diagnoses:   Acute colitis       ED Disposition  ED Disposition     ED Disposition Condition Comment    Decision to Admit  Level of Care: Med/Surg [1]   Diagnosis: Acute colitis [385000]   Admitting Physician: RC PACHECO [[id number]]   Attending Physician: RC PACHECO [[id number]]            No follow-up provider specified.       Medication List      No changes  were made to your prescriptions during this visit.          Allan Barrera MD  08/06/21 0003

## 2021-08-07 ENCOUNTER — READMISSION MANAGEMENT (OUTPATIENT)
Dept: CALL CENTER | Facility: HOSPITAL | Age: 76
End: 2021-08-07

## 2021-08-07 VITALS
OXYGEN SATURATION: 95 % | RESPIRATION RATE: 20 BRPM | BODY MASS INDEX: 25.11 KG/M2 | HEART RATE: 83 BPM | SYSTOLIC BLOOD PRESSURE: 157 MMHG | HEIGHT: 67 IN | TEMPERATURE: 97.4 F | DIASTOLIC BLOOD PRESSURE: 69 MMHG | WEIGHT: 160 LBS

## 2021-08-07 LAB — POTASSIUM SERPL-SCNC: 4.3 MMOL/L (ref 3.5–5.2)

## 2021-08-07 PROCEDURE — 96376 TX/PRO/DX INJ SAME DRUG ADON: CPT

## 2021-08-07 PROCEDURE — G0378 HOSPITAL OBSERVATION PER HR: HCPCS

## 2021-08-07 PROCEDURE — 25010000002 MORPHINE PER 10 MG: Performed by: INTERNAL MEDICINE

## 2021-08-07 PROCEDURE — 25010000002 CEFTRIAXONE PER 250 MG: Performed by: INTERNAL MEDICINE

## 2021-08-07 PROCEDURE — 84132 ASSAY OF SERUM POTASSIUM: CPT | Performed by: INTERNAL MEDICINE

## 2021-08-07 PROCEDURE — 96367 TX/PROPH/DG ADDL SEQ IV INF: CPT

## 2021-08-07 RX ORDER — LEVOFLOXACIN 500 MG/1
500 TABLET, FILM COATED ORAL DAILY
Qty: 5 TABLET | Refills: 0 | OUTPATIENT
Start: 2021-08-07 | End: 2021-08-15

## 2021-08-07 RX ORDER — METRONIDAZOLE 500 MG/1
500 TABLET ORAL EVERY 8 HOURS SCHEDULED
Qty: 16 TABLET | Refills: 0 | Status: SHIPPED | OUTPATIENT
Start: 2021-08-07 | End: 2021-08-13

## 2021-08-07 RX ADMIN — MORPHINE SULFATE 1 MG: 2 INJECTION, SOLUTION INTRAMUSCULAR; INTRAVENOUS at 10:44

## 2021-08-07 RX ADMIN — MORPHINE SULFATE 1 MG: 2 INJECTION, SOLUTION INTRAMUSCULAR; INTRAVENOUS at 00:33

## 2021-08-07 RX ADMIN — MORPHINE SULFATE 1 MG: 2 INJECTION, SOLUTION INTRAMUSCULAR; INTRAVENOUS at 06:12

## 2021-08-07 RX ADMIN — CEFTRIAXONE SODIUM 1 G: 1 INJECTION, POWDER, FOR SOLUTION INTRAMUSCULAR; INTRAVENOUS at 00:32

## 2021-08-07 RX ADMIN — LISINOPRIL 20 MG: 20 TABLET ORAL at 08:47

## 2021-08-07 RX ADMIN — SODIUM CHLORIDE 100 ML/HR: 9 INJECTION, SOLUTION INTRAVENOUS at 00:33

## 2021-08-07 RX ADMIN — METRONIDAZOLE 500 MG: 500 TABLET ORAL at 06:03

## 2021-08-07 RX ADMIN — AMLODIPINE BESYLATE 5 MG: 5 TABLET ORAL at 08:47

## 2021-08-07 RX ADMIN — PANTOPRAZOLE SODIUM 40 MG: 40 TABLET, DELAYED RELEASE ORAL at 05:00

## 2021-08-07 NOTE — DISCHARGE SUMMARY
[facility] Medicine Services  DISCHARGE SUMMARY       Date of Admission: 8/5/2021  Date of Discharge:  8/7/2021  Primary Care Physician: Emi Wright MD    Presenting Problem/History of Present Illness:  Acute colitis [K52.9]       Final Discharge Diagnoses:  Active Hospital Problems    Diagnosis    • Acute colitis    • Essential hypertension        Consults:   Consults     No orders found from 7/7/2021 to 8/6/2021.          Pertinent Test Results:   Lab Results (most recent)     Procedure Component Value Units Date/Time    Potassium [[id number]]  (Normal) Collected: 08/07/21 0244    Specimen: Blood Updated: 08/07/21 0343     Potassium 4.3 mmol/L     Blood Culture - Blood, Hand, Right [[id number]] Collected: 08/05/21 2329    Specimen: Blood from Hand, Right Updated: 08/06/21 2345     Blood Culture No growth at 24 hours    Blood Culture - Blood, Arm, Right [[id number]] Collected: 08/05/21 2238    Specimen: Blood from Arm, Right Updated: 08/06/21 2300     Blood Culture No growth at 24 hours    Basic Metabolic Panel [[id number]]  (Abnormal) Collected: 08/06/21 0651    Specimen: Blood Updated: 08/06/21 0739     Glucose 104 mg/dL      BUN 14 mg/dL      Creatinine 0.92 mg/dL      Sodium 137 mmol/L      Potassium 3.3 mmol/L      Chloride 102 mmol/L      CO2 27.0 mmol/L      Calcium 8.6 mg/dL      eGFR Non African Amer 59 mL/min/1.73      BUN/Creatinine Ratio 15.2     Anion Gap 8.0 mmol/L     Narrative:      GFR Normal >60  Chronic Kidney Disease <60  Kidney Failure <15      CBC Auto Differential [[id number]]  (Abnormal) Collected: 08/06/21 0651    Specimen: Blood Updated: 08/06/21 0718     WBC 14.38 10*3/mm3      RBC 4.01 10*6/mm3      Hemoglobin 12.4 g/dL      Hematocrit 37.0 %      MCV 92.3 fL      MCH 30.9 pg      MCHC 33.5 g/dL      RDW 12.4 %      RDW-SD 42.1 fl      MPV 9.8 fL      Platelets 336 10*3/mm3      Neutrophil % 60.4 %      Lymphocyte % 28.0 %      Monocyte % 10.0  %      Eosinophil % 0.6 %      Basophil % 0.5 %      Immature Grans % 0.5 %      Neutrophils, Absolute 8.69 10*3/mm3      Lymphocytes, Absolute 4.02 10*3/mm3      Monocytes, Absolute 1.44 10*3/mm3      Eosinophils, Absolute 0.09 10*3/mm3      Basophils, Absolute 0.07 10*3/mm3      Immature Grans, Absolute 0.07 10*3/mm3      nRBC 0.0 /100 WBC     COVID-19 and FLU A/B PCR - Swab, Nasopharynx [[id number]]  (Normal) Collected: 08/06/21 0011    Specimen: Swab from Nasopharynx Updated: 08/06/21 0109     COVID19 Not Detected     Influenza A PCR Not Detected     Influenza B PCR Not Detected    Narrative:      Fact sheet for providers: https://www.fda.gov/media/327606/download    Fact sheet for patients: https://www.fda.gov/media/226128/download    Test performed by PCR.    Lactic Acid, Plasma [[id number]]  (Normal) Collected: 08/05/21 2238    Specimen: Blood Updated: 08/05/21 2310     Lactate 1.0 mmol/L     Urinalysis, Microscopic Only - Urine, Clean Catch [[id number]]  (Abnormal) Collected: 08/05/21 2245    Specimen: Urine, Clean Catch Updated: 08/05/21 2305     RBC, UA 3-5 /HPF      WBC, UA 6-12 /HPF      Bacteria, UA 4+ /HPF      Squamous Epithelial Cells, UA None Seen /HPF      Hyaline Casts, UA 0-2 /LPF      Methodology Automated Microscopy    Urinalysis With Microscopic If Indicated (No Culture) - Urine, Clean Catch [[id number]]  (Abnormal) Collected: 08/05/21 2245    Specimen: Urine, Clean Catch Updated: 08/05/21 2305     Color, UA Yellow     Appearance, UA Clear     pH, UA 6.0     Specific Gravity, UA 1.018     Glucose, UA Negative     Ketones, UA 15 mg/dL (1+)     Bilirubin, UA Negative     Blood, UA Small (1+)     Protein, UA Negative     Leuk Esterase, UA Small (1+)     Nitrite, UA Positive     Urobilinogen, UA 0.2 E.U./dL    Lipase [[id number]]  (Normal) Collected: 08/05/21 1846    Specimen: Blood Updated: 08/05/21 2211     Lipase 13 U/L     [place] Draw [[id number]] Collected: 08/05/21 1846    Specimen: Blood  Updated: 08/05/21 2000    Narrative:      The following orders were created for panel order Strong Draw.  Procedure                               Abnormality         Status                     ---------                               -----------         ------                     Green Top (Gel)[[id number]]                                  Final result               Lavender Top[[id number]]                                     Final result               Gold Top - SST[[id number]]                                   Final result                 Please view results for these tests on the individual orders.    Green Top (Gel) [[id number]] Collected: 08/05/21 1846    Specimen: Blood Updated: 08/05/21 2000     Extra Tube Hold for add-ons.     Comment: Auto resulted.       Lavender Top [[id number]] Collected: 08/05/21 1846    Specimen: Blood Updated: 08/05/21 2000     Extra Tube hold for add-on     Comment: Auto resulted       Gold Top - SST [[id number]] Collected: 08/05/21 1846    Specimen: Blood Updated: 08/05/21 2000     Extra Tube Hold for add-ons.     Comment: Auto resulted.       CBC & Differential [[id number]]  (Abnormal) Collected: 08/05/21 1846    Specimen: Blood Updated: 08/05/21 1918    Narrative:      The following orders were created for panel order CBC & Differential.  Procedure                               Abnormality         Status                     ---------                               -----------         ------                     CBC Auto Differential[[id number]]        Abnormal            Final result                 Please view results for these tests on the individual orders.    CBC Auto Differential [[id number]]  (Abnormal) Collected: 08/05/21 1846    Specimen: Blood Updated: 08/05/21 1918     WBC 17.92 10*3/mm3      RBC 4.71 10*6/mm3      Hemoglobin 15.0 g/dL      Hematocrit 43.2 %      MCV 91.7 fL      MCH 31.8 pg      MCHC 34.7 g/dL      RDW 12.5 %      RDW-SD 42.0 fl      MPV 9.5 fL      Platelets 381  10*3/mm3      Neutrophil % 72.5 %      Lymphocyte % 17.6 %      Monocyte % 9.2 %      Eosinophil % 0.1 %      Basophil % 0.3 %      Immature Grans % 0.3 %      Neutrophils, Absolute 12.98 10*3/mm3      Lymphocytes, Absolute 3.15 10*3/mm3      Monocytes, Absolute 1.65 10*3/mm3      Eosinophils, Absolute 0.02 10*3/mm3      Basophils, Absolute 0.06 10*3/mm3      Immature Grans, Absolute 0.06 10*3/mm3      nRBC 0.0 /100 WBC     Comprehensive Metabolic Panel [[id number]]  (Abnormal) Collected: 08/05/21 1846    Specimen: Blood Updated: 08/05/21 1913     Glucose 136 mg/dL      BUN 20 mg/dL      Creatinine 1.15 mg/dL      Sodium 134 mmol/L      Potassium 3.7 mmol/L      Chloride 96 mmol/L      CO2 28.0 mmol/L      Calcium 9.5 mg/dL      Total Protein 7.4 g/dL      Albumin 4.30 g/dL      ALT (SGPT) 13 U/L      AST (SGOT) 18 U/L      Alkaline Phosphatase 95 U/L      Total Bilirubin 0.9 mg/dL      eGFR Non African Amer 46 mL/min/1.73      Globulin 3.1 gm/dL      A/G Ratio 1.4 g/dL      BUN/Creatinine Ratio 17.4     Anion Gap 10.0 mmol/L     Narrative:      GFR Normal >60  Chronic Kidney Disease <60  Kidney Failure <15          Imaging Results (Most Recent)     Procedure Component Value Units Date/Time    CT Abdomen Pelvis With Contrast [[id number]] Collected: 08/05/21 2300     Updated: 08/05/21 2333    Narrative:      CT ABDOMEN PELVIS WITH IV CONTRAST    INDICATION: 76 years Female; abdominal pain    TECHNIQUE:  CT scan of the abdomen and pelvis was performed with  IV contrast.  This exam was performed according to our  departmental dose-optimization program, which includes automated  exposure control, adjustment of the mA and/or kV according to  patient size and/or use of iterative reconstruction technique.     COMPARISON: None.    FINDINGS:  Liver: Unremarkable.  Gallbladder/Biliary tree: Unremarkable.  Pancreas: Unremarkable.  Spleen: Punctate calcifications in the spleen probably represent  prior granulomatous  infection.  Adrenals: Unremarkable.  Genitourinary: Small nonenhancing cyst in the mid right kidney is  likely benign. No hydronephrosis or nephrolithiasis. No bladder  wall thickening. Status post hysterectomy. No adnexal masses.  Small amount of free fluid in the right adnexa.  Gastrointestinal: Circumferential wall thickening with prominent  submucosal edema and surrounding fat stranding is involving the  splenic flexure and descending colon. Diverticulosis of the  sigmoid without diverticulitis. No gastric wall thickening. No  small bowel wall thickening or obstruction. No free air.  Peritoneum: Unremarkable.  Vasculature: Mild atherosclerosis of the aorta and iliac  arteries. The celiac artery and SMA and MAZIN are patent.   Lymph nodes: No pathologically enlarged lymph nodes.  Bones: Normal alignment of the right total hip prosthetic.  Degenerative disc disease at L2-3, L3-4, and L4-5.  Anterolisthesis at L4-5.  Soft tissues: Unremarkable.  Incidental findings: None.      Impression:      1.  Circumferential wall thickening with prominent submucosal  edema and surrounding fat stranding is involving the splenic  flexure and descending colon. Differential diagnosis includes  infectious colitis or inflammatory bowel disease. Ischemic  colitis cannot be excluded.  2.  Diverticulosis of the sigmoid without diverticulitis.  3.  Small amount of free fluid in the right adnexa.    Electronically signed by:  Bin Pitt  8/5/2021 11:32 PM CDT  Workstation: [id number]          Chief Complaint on Day of Discharge: None    Hospital Course:  The patient is a 76 y.o. female who presented to [facility] with abdominal pain and blood in her stool.  CT found her to have circumferential wall thickening at the splenic flexure and she was diagnosed with acute colitis.  It was presumed to be infectious and she was started on IV antibiotics, IV fluids, and bowel rest.  Patient improved over the 24 hours in the hospital  "and requested to be discharged.  She tolerated regular diet was discharged home with instructions to slowly advance diet from bland and finished course of antibiotics.  She will see her PCP in a week.    Condition on Discharge: Stable    Physical Exam on Discharge:  /68 (BP Location: Left arm, Patient Position: Sitting)   Pulse 75   Temp 97.3 °F (36.3 °C) (Oral)   Resp 20   Ht 170.2 cm (67\")   Wt 72.6 kg (160 lb)   SpO2 96%   BMI 25.06 kg/m²   Physical Exam  Vitals reviewed.   Constitutional:       Appearance: She is well-developed.   HENT:      Head: Normocephalic and atraumatic.   Eyes:      Pupils: Pupils are equal, round, and reactive to light.   Cardiovascular:      Rate and Rhythm: Normal rate and regular rhythm.      Heart sounds: Normal heart sounds. No murmur heard.   No friction rub. No gallop.    Pulmonary:      Effort: Pulmonary effort is normal. No respiratory distress.      Breath sounds: Normal breath sounds. No wheezing or rales.   Chest:      Chest wall: No tenderness.   Abdominal:      General: Bowel sounds are normal. There is no distension.      Palpations: Abdomen is soft.      Tenderness: There is no abdominal tenderness. There is no guarding.   Musculoskeletal:      Cervical back: Normal range of motion and neck supple.   Skin:     General: Skin is warm and dry.   Psychiatric:         Behavior: Behavior normal.         Thought Content: Thought content normal.           Discharge Disposition:  Home or Self Care    Discharge Medications:     Discharge Medications      New Medications      Instructions Start Date   levoFLOXacin 500 MG tablet  Commonly known as: Levaquin   500 mg, Oral, Daily      metroNIDAZOLE 500 MG tablet  Commonly known as: FLAGYL   500 mg, Oral, Every 8 Hours Scheduled         Changes to Medications      Instructions Start Date   amLODIPine 5 MG tablet  Commonly known as: NORVASC  What changed: when to take this   5 mg, Oral, Daily      gabapentin 300 MG " capsule  Commonly known as: NEURONTIN  What changed:   · when to take this  · additional instructions   TAKE ONE CAPSULE BY MOUTH EVERY MORNING AND take TWO capsules BY MOUTH AT BEDTIME         Continue These Medications      Instructions Start Date   amitriptyline 25 MG tablet  Commonly known as: ELAVIL   25 mg, Oral, Nightly      Caltrate 600+D 600-400 MG-UNIT per tablet  Generic drug: calcium carbonate-vitamin d   1 tablet, Oral, Daily      HYDROcodone-acetaminophen 5-325 MG per tablet  Commonly known as: NORCO   Take 1 tablet by mouth Every 6 (Six) Hours As Needed for Moderate Pain (4-6).      lisinopril 20 MG tablet  Commonly known as: PRINIVIL,ZESTRIL   20 mg, Oral, Daily      metroNIDAZOLE 0.75 % cream  Commonly known as: METROCREAM   Topical, 2 Times Daily             Discharge Diet:   Diet Instructions     Advance Diet As Tolerated            Activity at Discharge:   Activity Instructions     Activity as Tolerated            Follow-up Appointments:   Future Appointments   Date Time Provider Department Center   9/30/2021  2:30 PM Emi Wright MD [facility]   9/30/2021  3:30 PM [facility] DEXA 1 [facility]   9/30/2021  3:45 PM [facility] MAMM 1 [facility]   PCP in 1 week    Test Results Pending at Discharge:   Pending Labs     Order Current Status    Blood Culture - Blood, Arm, Right Preliminary result    Blood Culture - Blood, Hand, Right Preliminary result                  This document has been electronically signed by Fernando Perkins MD on August 7, 2021 12:39 CDT      Time: 35 minutes

## 2021-08-07 NOTE — OUTREACH NOTE
Prep Survey      Responses   [facility] patient discharged from?  [place]   Is LACE score < 7 ?  Yes   Emergency Room discharge w/ pulse ox?  No   Eligibility  [facility]   Date of Admission  08/05/21   Date of Discharge  08/07/21   Discharge Disposition  Home or Self Care   Discharge diagnosis  Abdominal painAcute colitis   Does the patient have one of the following disease processes/diagnoses(primary or secondary)?  Other   Does the patient have Home health ordered?  No   Is there a DME ordered?  No   Prep survey completed?  Yes          Misa Hernandez RN

## 2021-08-07 NOTE — PLAN OF CARE
Goal Outcome Evaluation:  Plan of Care Reviewed With: patient        Progress: improving  Outcome Summary: pt states that she has not had any blood in stool througout night; vss; states that her abd pain is better but having her chronic neck pain; will continue to monitor.

## 2021-08-09 ENCOUNTER — TRANSITIONAL CARE MANAGEMENT TELEPHONE ENCOUNTER (OUTPATIENT)
Dept: CALL CENTER | Facility: HOSPITAL | Age: 76
End: 2021-08-09

## 2021-08-09 NOTE — OUTREACH NOTE
Call Center TCM Note      Responses   [facility] patient discharged from?  [place]   Does the patient have one of the following disease processes/diagnoses(primary or secondary)?  Other   TCM attempt successful?  No   Unsuccessful attempts  Attempt 1          Paris Moreno MA    8/9/2021, 13:43 CDT

## 2021-08-09 NOTE — OUTREACH NOTE
Call Center TCM Note      Responses   [facility] patient discharged from?  [place]   Does the patient have one of the following disease processes/diagnoses(primary or secondary)?  Other   TCM attempt successful?  No   Unsuccessful attempts  Attempt 2          Paris Moreno MA    8/9/2021, 15:12 CDT

## 2021-08-10 ENCOUNTER — TRANSITIONAL CARE MANAGEMENT TELEPHONE ENCOUNTER (OUTPATIENT)
Dept: CALL CENTER | Facility: HOSPITAL | Age: 76
End: 2021-08-10

## 2021-08-10 LAB
BACTERIA SPEC AEROBE CULT: NORMAL
BACTERIA SPEC AEROBE CULT: NORMAL

## 2021-08-10 NOTE — OUTREACH NOTE
Call Center TCM Note      Responses   [facility] patient discharged from?  [place]   Does the patient have one of the following disease processes/diagnoses(primary or secondary)?  Other   TCM attempt successful?  No   Unsuccessful attempts  Attempt 3          Paris Richard RN    8/10/2021, 09:52 EDT

## 2021-08-15 PROBLEM — J02.9 ACUTE PHARYNGITIS: Status: ACTIVE | Noted: 2021-08-15

## 2021-09-28 ENCOUNTER — LAB (OUTPATIENT)
Dept: LAB | Facility: HOSPITAL | Age: 76
End: 2021-09-28

## 2021-09-28 DIAGNOSIS — E55.9 VITAMIN D DEFICIENCY: ICD-10-CM

## 2021-09-28 DIAGNOSIS — M50.90 CERVICAL DISC DISEASE: Chronic | ICD-10-CM

## 2021-09-28 DIAGNOSIS — M51.36 DEGENERATIVE DISC DISEASE, LUMBAR: Chronic | ICD-10-CM

## 2021-09-28 DIAGNOSIS — M15.9 DEGENERATIVE JOINT DISEASE INVOLVING MULTIPLE JOINTS ON BOTH SIDES OF BODY: Chronic | ICD-10-CM

## 2021-09-28 DIAGNOSIS — I10 ESSENTIAL HYPERTENSION: ICD-10-CM

## 2021-09-28 PROCEDURE — 81001 URINALYSIS AUTO W/SCOPE: CPT

## 2021-09-28 PROCEDURE — 87088 URINE BACTERIA CULTURE: CPT

## 2021-09-28 PROCEDURE — 80307 DRUG TEST PRSMV CHEM ANLYZR: CPT

## 2021-09-28 PROCEDURE — 87086 URINE CULTURE/COLONY COUNT: CPT

## 2021-09-28 PROCEDURE — G0481 DRUG TEST DEF 8-14 CLASSES: HCPCS

## 2021-09-28 PROCEDURE — 85025 COMPLETE CBC W/AUTO DIFF WBC: CPT

## 2021-09-28 PROCEDURE — 87186 SC STD MICRODIL/AGAR DIL: CPT

## 2021-09-28 PROCEDURE — 80053 COMPREHEN METABOLIC PANEL: CPT

## 2021-09-28 PROCEDURE — 80061 LIPID PANEL: CPT

## 2021-09-28 PROCEDURE — 82306 VITAMIN D 25 HYDROXY: CPT

## 2021-09-28 PROCEDURE — 85007 BL SMEAR W/DIFF WBC COUNT: CPT

## 2021-09-28 PROCEDURE — 36415 COLL VENOUS BLD VENIPUNCTURE: CPT

## 2021-09-29 LAB
25(OH)D3 SERPL-MCNC: 32.2 NG/ML (ref 30–100)
ALBUMIN SERPL-MCNC: 4.2 G/DL (ref 3.5–5.2)
ALBUMIN/GLOB SERPL: 1.4 G/DL
ALP SERPL-CCNC: 107 U/L (ref 39–117)
ALT SERPL W P-5'-P-CCNC: 13 U/L (ref 1–33)
ANION GAP SERPL CALCULATED.3IONS-SCNC: 11 MMOL/L (ref 5–15)
AST SERPL-CCNC: 20 U/L (ref 1–32)
BACTERIA UR QL AUTO: ABNORMAL /HPF
BILIRUB SERPL-MCNC: 0.2 MG/DL (ref 0–1.2)
BILIRUB UR QL STRIP: NEGATIVE
BUN SERPL-MCNC: 12 MG/DL (ref 8–23)
BUN/CREAT SERPL: 13.6 (ref 7–25)
CALCIUM SPEC-SCNC: 9.9 MG/DL (ref 8.6–10.5)
CHLORIDE SERPL-SCNC: 103 MMOL/L (ref 98–107)
CHOLEST SERPL-MCNC: 158 MG/DL (ref 0–200)
CLARITY UR: CLEAR
CO2 SERPL-SCNC: 23 MMOL/L (ref 22–29)
COLOR UR: YELLOW
CREAT SERPL-MCNC: 0.88 MG/DL (ref 0.57–1)
DEPRECATED RDW RBC AUTO: 44.9 FL (ref 37–54)
EOSINOPHIL # BLD MANUAL: 0.32 10*3/MM3 (ref 0–0.4)
EOSINOPHIL NFR BLD MANUAL: 5.2 % (ref 0.3–6.2)
ERYTHROCYTE [DISTWIDTH] IN BLOOD BY AUTOMATED COUNT: 12.5 % (ref 12.3–15.4)
GFR SERPL CREATININE-BSD FRML MDRD: 62 ML/MIN/1.73
GLOBULIN UR ELPH-MCNC: 3 GM/DL
GLUCOSE SERPL-MCNC: 102 MG/DL (ref 65–99)
GLUCOSE UR STRIP-MCNC: NEGATIVE MG/DL
HCT VFR BLD AUTO: 40.7 % (ref 34–46.6)
HDLC SERPL-MCNC: 38 MG/DL (ref 40–60)
HGB BLD-MCNC: 12.9 G/DL (ref 12–15.9)
HGB UR QL STRIP.AUTO: ABNORMAL
HYALINE CASTS UR QL AUTO: ABNORMAL /LPF
KETONES UR QL STRIP: NEGATIVE
LDLC SERPL CALC-MCNC: 86 MG/DL (ref 0–100)
LDLC/HDLC SERPL: 2.11 {RATIO}
LEUKOCYTE ESTERASE UR QL STRIP.AUTO: ABNORMAL
LYMPHOCYTES # BLD MANUAL: 4.51 10*3/MM3 (ref 0.7–3.1)
LYMPHOCYTES NFR BLD MANUAL: 7.2 % (ref 5–12)
LYMPHOCYTES NFR BLD MANUAL: 70.1 % (ref 19.6–45.3)
MCH RBC QN AUTO: 30.7 PG (ref 26.6–33)
MCHC RBC AUTO-ENTMCNC: 31.7 G/DL (ref 31.5–35.7)
MCV RBC AUTO: 96.9 FL (ref 79–97)
MONOCYTES # BLD AUTO: 0.44 10*3/MM3 (ref 0.1–0.9)
NEUTROPHILS # BLD AUTO: 0.81 10*3/MM3 (ref 1.7–7)
NEUTROPHILS NFR BLD MANUAL: 13.4 % (ref 42.7–76)
NITRITE UR QL STRIP: POSITIVE
NRBC BLD AUTO-RTO: 0 /100 WBC (ref 0–0.2)
PH UR STRIP.AUTO: 6 [PH] (ref 5–8)
PLAT MORPH BLD: NORMAL
PLATELET # BLD AUTO: 564 10*3/MM3 (ref 140–450)
PMV BLD AUTO: 9.9 FL (ref 6–12)
POTASSIUM SERPL-SCNC: 4.4 MMOL/L (ref 3.5–5.2)
PROT SERPL-MCNC: 7.2 G/DL (ref 6–8.5)
PROT UR QL STRIP: NEGATIVE
RBC # BLD AUTO: 4.2 10*6/MM3 (ref 3.77–5.28)
RBC # UR: ABNORMAL /HPF
RBC MORPH BLD: NORMAL
REF LAB TEST METHOD: ABNORMAL
SODIUM SERPL-SCNC: 137 MMOL/L (ref 136–145)
SP GR UR STRIP: 1.01 (ref 1–1.03)
SQUAMOUS #/AREA URNS HPF: ABNORMAL /HPF
TRIGL SERPL-MCNC: 199 MG/DL (ref 0–150)
UROBILINOGEN UR QL STRIP: ABNORMAL
VARIANT LYMPHS NFR BLD MANUAL: 4.1 % (ref 0–5)
VLDLC SERPL-MCNC: 34 MG/DL (ref 5–40)
WBC # BLD AUTO: 6.08 10*3/MM3 (ref 3.4–10.8)
WBC MORPH BLD: NORMAL
WBC UR QL AUTO: ABNORMAL /HPF

## 2021-09-30 ENCOUNTER — OFFICE VISIT (OUTPATIENT)
Dept: FAMILY MEDICINE CLINIC | Facility: CLINIC | Age: 76
End: 2021-09-30

## 2021-09-30 VITALS
SYSTOLIC BLOOD PRESSURE: 120 MMHG | DIASTOLIC BLOOD PRESSURE: 60 MMHG | HEIGHT: 67 IN | WEIGHT: 163.4 LBS | HEART RATE: 64 BPM | OXYGEN SATURATION: 99 % | BODY MASS INDEX: 25.65 KG/M2

## 2021-09-30 DIAGNOSIS — M51.36 DEGENERATIVE DISC DISEASE, LUMBAR: Chronic | ICD-10-CM

## 2021-09-30 DIAGNOSIS — M15.9 DEGENERATIVE JOINT DISEASE INVOLVING MULTIPLE JOINTS ON BOTH SIDES OF BODY: Chronic | ICD-10-CM

## 2021-09-30 DIAGNOSIS — M50.90 CERVICAL DISC DISEASE: Chronic | ICD-10-CM

## 2021-09-30 DIAGNOSIS — Z00.00 MEDICARE ANNUAL WELLNESS VISIT, SUBSEQUENT: Primary | ICD-10-CM

## 2021-09-30 PROCEDURE — 1159F MED LIST DOCD IN RCRD: CPT | Performed by: GENERAL PRACTICE

## 2021-09-30 PROCEDURE — 1125F AMNT PAIN NOTED PAIN PRSNT: CPT | Performed by: GENERAL PRACTICE

## 2021-09-30 PROCEDURE — G0439 PPPS, SUBSEQ VISIT: HCPCS | Performed by: GENERAL PRACTICE

## 2021-09-30 RX ORDER — HYDROCODONE BITARTRATE AND ACETAMINOPHEN 5; 325 MG/1; MG/1
1 TABLET ORAL EVERY 6 HOURS PRN
Qty: 120 TABLET | Refills: 0 | Status: SHIPPED | OUTPATIENT
Start: 2021-09-30 | End: 2021-11-11 | Stop reason: SDUPTHER

## 2021-09-30 RX ORDER — AMITRIPTYLINE HYDROCHLORIDE 25 MG/1
25 TABLET, FILM COATED ORAL NIGHTLY
Qty: 90 TABLET | Refills: 3 | Status: SHIPPED | OUTPATIENT
Start: 2021-09-30 | End: 2021-11-11 | Stop reason: SDUPTHER

## 2021-10-01 LAB — BACTERIA SPEC AEROBE CULT: ABNORMAL

## 2021-10-01 RX ORDER — SULFAMETHOXAZOLE AND TRIMETHOPRIM 800; 160 MG/1; MG/1
1 TABLET ORAL 2 TIMES DAILY
Qty: 14 TABLET | Refills: 0 | Status: SHIPPED | OUTPATIENT
Start: 2021-10-01 | End: 2021-11-11

## 2021-10-03 LAB — DRUGS UR: NORMAL

## 2021-10-12 ENCOUNTER — CLINICAL SUPPORT (OUTPATIENT)
Dept: AUDIOLOGY | Facility: CLINIC | Age: 76
End: 2021-10-12

## 2021-10-12 ENCOUNTER — TELEPHONE (OUTPATIENT)
Dept: FAMILY MEDICINE CLINIC | Facility: CLINIC | Age: 76
End: 2021-10-12

## 2021-10-12 DIAGNOSIS — H90.3 SENSORINEURAL HEARING LOSS, BILATERAL: Primary | ICD-10-CM

## 2021-10-12 DIAGNOSIS — H91.13 PRESBYCUSIS OF BOTH EARS: Primary | ICD-10-CM

## 2021-10-12 DIAGNOSIS — Z46.1 ENCOUNTER FOR FITTING OR ADJUSTMENT OF HEARING AID: ICD-10-CM

## 2021-10-12 PROCEDURE — HEARINGNOCHG: Performed by: AUDIOLOGIST

## 2021-10-12 NOTE — TELEPHONE ENCOUNTER
Jaleesa has to get a new hearing aid because the dog ate hers, Jose Luis said  she has to have a referral in order to see Kayleigh.  Can you place a referral for her?

## 2021-10-13 ENCOUNTER — CLINICAL SUPPORT (OUTPATIENT)
Dept: AUDIOLOGY | Facility: CLINIC | Age: 76
End: 2021-10-13

## 2021-10-13 DIAGNOSIS — H90.3 SENSORINEURAL HEARING LOSS, BILATERAL: Primary | ICD-10-CM

## 2021-10-13 PROCEDURE — 92567 TYMPANOMETRY: CPT | Performed by: AUDIOLOGIST

## 2021-10-13 PROCEDURE — 92557 COMPREHENSIVE HEARING TEST: CPT | Performed by: AUDIOLOGIST

## 2021-10-13 NOTE — PROGRESS NOTES
HEARING AID EVALUATION WITH AUDIOGRAM    Name:  Jaleesa Nam  :  1945  Age:  76 y.o.  Date of Evaluation:  10/13/2021      HISTORY    Reason for visit:  Jaleesa Nam is seen today for a hearing test at the request of Dr. Emi Wright.  Patient reports her dog ate her hearing aids, and she is needing new aids.  She states she may have a change in hearing.    Hearing aid history:  Patient does not have hearing aids at this time. and Patient is interested in hearing aids at this time.    EVALUATION    See Audiogram    RESULTS:    Otoscopy and Tympanometry 226 Hz :  Right Ear:  Otoscopy:  Clear ear canal          Tympanometry:  Middle ear function within normal limits    Left Ear:   Otoscopy:  Visible ear drum        Tympanometry:  Middle ear function within normal limits    Test technique:  Standard Audiometry     Pure Tone Audiometry:   Patient responded to pure tones at 35-65 dB for 250-8000 Hz in right ear, and at 35-70 dB for 250-8000 Hz in left ear.       Speech Audiometry:        Right Ear:  Speech Reception Threshold (SRT) was obtained at 40 dBHL                 Speech Discrimination scores were 92% in quiet when words were presented at 75 dBHL       Left Ear:  Speech Reception Threshold (SRT) was obtained at 45 dBHL                 Speech Discrimination scores were 80% in quiet when words were presented at 75 dBHL    Reliability:   good    IMPRESSIONS:  1.  Tympanometry results are consistent with Middle ear function within normal limits in both ears.  2.  Pure tone results are consistent with mild to moderate sloping sensorineural hearing loss  for both ears.       RECOMMENDATIONS:  Test results were reviewed with patient, and all questions were answered at this time.  During the Hearing Aid discussion, Ms. Nam has chosen 2  in the Canal (CHEMA) hearing aid(s) for both ears.  The hearing aid recommended is from [employer] with the 8tracks RadioeAttivio [id number] digital circuit and #2xS  receivers. The cost of this hearing aid is $1,700.00 per aid for a total of $3,400.00 which is due at the time of fitting.        The hearing aids will be ordered.  Once they arrive, she will be contacted to make an appointment for her fitting.      It was a pleasure seeing Jaleesa Guallpa Olvinviolet in Audiology today.  I look forward to helping Ms. Nam with her amplification needs.            This document has been electronically signed by Argenis Menchaca MS CCC-PIETRO on October 13, 2021 14:02 CDT       Argenis Menchaca MS CCC-PIETRO  Licensed Audiologist

## 2021-10-13 NOTE — PROGRESS NOTES
HEARING AID CHECK    Name:  Jaleesa Nam  :  1945  Age:  76 y.o.  Date of Evaluation:  10/13/2021      HISTORY    Reason for visit:  Jaleesa Nam is seen today for a hearing aid problem.  Patient reports her dog has eaten both of her hearing aids, and she has been wearing her 's hearing aids.  She asked what she needs to do to get new hearing aids.    Hearing aid history:  Patient is interested in hearing aids at this time.     OFFICE VISIT    During today's visit she was told she needs to have a new hearing test and hearing aid consult.  She will get a referral for the hearing test, and then we can proceed with the hearing aid evaluation.     It was a pleasure seeing Jaleesa Nam in Audiology today.  It is a pleasure helping Ms. Nam with her amplification needs.             This document has been electronically signed by Argenis Menchaca MS CCC-A on 2021 08:31 CDT       Argenis Menchaca MS CCC-A  Licensed Audiologist    For Billing and Codin  Hearing Aid Check, Binaural - no charge

## 2021-10-16 ENCOUNTER — FLU SHOT (OUTPATIENT)
Dept: FAMILY MEDICINE CLINIC | Facility: CLINIC | Age: 76
End: 2021-10-16

## 2021-10-16 DIAGNOSIS — Z23 NEED FOR INFLUENZA VACCINATION: Primary | ICD-10-CM

## 2021-10-16 PROCEDURE — 90662 IIV NO PRSV INCREASED AG IM: CPT | Performed by: GENERAL PRACTICE

## 2021-10-16 PROCEDURE — G0008 ADMIN INFLUENZA VIRUS VAC: HCPCS | Performed by: GENERAL PRACTICE

## 2021-10-26 ENCOUNTER — CLINICAL SUPPORT (OUTPATIENT)
Dept: AUDIOLOGY | Facility: CLINIC | Age: 76
End: 2021-10-26

## 2021-10-26 DIAGNOSIS — Z46.1 ENCOUNTER FOR FITTING OR ADJUSTMENT OF HEARING AID: Primary | ICD-10-CM

## 2021-10-26 PROCEDURE — HEARINGNOCHG: Performed by: AUDIOLOGIST

## 2021-10-27 NOTE — PROGRESS NOTES
HEARING AID FITTING    Name:  Jaleesa Nam  :  1945  Age:  76 y.o.  Date of Evaluation:  10/27/2021      HISTORY    Reason for visit:  Jaleesa Nam is seen today for a hearing aid fitting.  The hearing aids to be fit are  in the Canal (CHEMA) hearing aids from [employer] with the [employer] [id number] digital circuit.    Right Hearing Aid Serial Number: [id number]  Left Hearing Aid Serial Number: [id number]      Warranty Expiration:  's warranty extends through 2023 which covers repairs, loss and damage.    Battery Size:  312    The hearing aids were fit to Ms. Nam's ears using #2xS receivers and medium closed domes.  Patient reported the fit was comfortable and the sound was good.  Patient was instructed on use and care of the hearing instruments.  The necessary paperwork was signed.  All questions were answered at this time.  The cost of this hearing aid is $1,700.00 per aid for a total of $3,400.00.  Patient's payment of 3,400.00 was collected at this time.    Ms. Nam will return in 2 weeks for a hearing aid follow up.  At that time we will make adjustments to the hearing aid(s) and address problems as necessary.      It was a pleasure seeing Jaleesa Nam in Audiology today.  It is a pleasure helping Ms. Nam with her amplification needs.             This document has been electronically signed by Argenis Menchaca MS CCC-A on 2021 11:39 CDT        Argenis Menchaca MS CCC-A  Licensed Audiologist      For Billing and Coding:    Hearing Aid, Digital Binaural BTE - no charge

## 2021-11-08 ENCOUNTER — CLINICAL SUPPORT (OUTPATIENT)
Dept: AUDIOLOGY | Facility: CLINIC | Age: 76
End: 2021-11-08

## 2021-11-08 DIAGNOSIS — Z46.1 ENCOUNTER FOR FITTING OR ADJUSTMENT OF HEARING AID: Primary | ICD-10-CM

## 2021-11-08 PROCEDURE — HEARINGNOCHG: Performed by: AUDIOLOGIST

## 2021-11-09 NOTE — PROGRESS NOTES
HEARING AID CHECK    Name:  Jaleesa Nam  :  1945  Age:  76 y.o.  Date of Evaluation:  2021      HISTORY    Reason for visit:  Jaleesa Nam is seen today for a hearing aid follow up.  Patient reports she notices a difference when she wears the hearing aids, but she still has a hard time hearing soft voices.      Hearing aid history:  Patient is currently wearing a  in the Canal (CHEMA) hearing aid in both ears ear(s).     OFFICE VISIT    During today's visit computer was used to increase master gain to 104%.  She reported the sound is better.  She will return for hearing aid assistance as necessary.    It was a pleasure seeing Jaleesa Nam in Audiology today.  It is a pleasure helping Ms. Nam with her amplification needs.             This document has been electronically signed by Argenis Menchaca MS CCC-A on 2021 08:24 CST       Argenis Menchaca MS CCC-A  Licensed Audiologist    For Billing and Codin  Hearing Aid Check, Binaural - no charge

## 2021-11-11 ENCOUNTER — LAB (OUTPATIENT)
Dept: LAB | Facility: HOSPITAL | Age: 76
End: 2021-11-11

## 2021-11-11 ENCOUNTER — OFFICE VISIT (OUTPATIENT)
Dept: FAMILY MEDICINE CLINIC | Facility: CLINIC | Age: 76
End: 2021-11-11

## 2021-11-11 VITALS
SYSTOLIC BLOOD PRESSURE: 134 MMHG | BODY MASS INDEX: 25.96 KG/M2 | OXYGEN SATURATION: 99 % | HEIGHT: 67 IN | HEART RATE: 73 BPM | DIASTOLIC BLOOD PRESSURE: 70 MMHG | WEIGHT: 165.4 LBS

## 2021-11-11 DIAGNOSIS — M15.9 DEGENERATIVE JOINT DISEASE INVOLVING MULTIPLE JOINTS ON BOTH SIDES OF BODY: Chronic | ICD-10-CM

## 2021-11-11 DIAGNOSIS — M51.36 DEGENERATIVE DISC DISEASE, LUMBAR: Primary | Chronic | ICD-10-CM

## 2021-11-11 DIAGNOSIS — N39.0 UTI (URINARY TRACT INFECTION), UNCOMPLICATED: ICD-10-CM

## 2021-11-11 DIAGNOSIS — M50.90 CERVICAL DISC DISEASE: Chronic | ICD-10-CM

## 2021-11-11 PROCEDURE — 81001 URINALYSIS AUTO W/SCOPE: CPT

## 2021-11-11 PROCEDURE — 87086 URINE CULTURE/COLONY COUNT: CPT

## 2021-11-11 PROCEDURE — 99213 OFFICE O/P EST LOW 20 MIN: CPT | Performed by: GENERAL PRACTICE

## 2021-11-11 RX ORDER — CEFPROZIL 500 MG/1
500 TABLET, FILM COATED ORAL 2 TIMES DAILY
Qty: 14 TABLET | Refills: 0 | OUTPATIENT
Start: 2021-11-11 | End: 2021-12-23

## 2021-11-11 RX ORDER — AMITRIPTYLINE HYDROCHLORIDE 25 MG/1
25 TABLET, FILM COATED ORAL NIGHTLY
Qty: 90 TABLET | Refills: 3 | Status: SHIPPED | OUTPATIENT
Start: 2021-11-11 | End: 2022-11-17 | Stop reason: SDUPTHER

## 2021-11-11 RX ORDER — HYDROCODONE BITARTRATE AND ACETAMINOPHEN 5; 325 MG/1; MG/1
1 TABLET ORAL EVERY 6 HOURS PRN
Qty: 120 TABLET | Refills: 0 | Status: SHIPPED | OUTPATIENT
Start: 2021-11-11 | End: 2022-02-10 | Stop reason: SDUPTHER

## 2021-11-11 NOTE — PROGRESS NOTES
Subjective   Jaleesa Nam is a 76 y.o. female.   Chief Complaint   Patient presents with   • Follow-up     [facility]   • Med Refill     For review and evaluation of management of chronic medical problems. Records reviewed. Recent labs, xrays reviewed and medications reconciled.  Neck pain is much improved since her surgery.  Still has back and joint pain.  Urine is very dark she is wondering whether she might have a UTI.  Back Pain  This is a new problem. The current episode started more than 1 month ago. The problem occurs constantly. The problem has been gradually worsening since onset. The pain is present in the gluteal and lumbar spine. The quality of the pain is described as aching. The pain does not radiate. The pain is at a severity of 6/10. The pain is moderate. The pain is the same all the time. The symptoms are aggravated by bending and sitting. Stiffness is present all day. Pertinent negatives include no bladder incontinence, bowel incontinence, paresthesias or perianal numbness. Risk factors include history of cancer. She has tried analgesics for the symptoms. The treatment provided significant relief.   Arthritis  Presents for follow-up visit. She complains of pain, stiffness and joint warmth. She reports no joint swelling. The symptoms have been stable. Affected locations include the right shoulder, left shoulder, left MCP, right MCP, left knee, right knee, right hip, left hip and neck. Her pain is at a severity of 6/10. Associated symptoms include pain at night. Pertinent negatives include no fatigue or Raynaud's syndrome. Compliance with total regimen is %. Compliance with medications is %.      The following portions of the patient's history were reviewed and updated as appropriate: allergies, current medications, past social history and problem list.    Outpatient Medications Prior to Visit   Medication Sig Dispense Refill   • amLODIPine (NORVASC) 5 MG tablet Take 1 tablet by  "mouth Daily. (Patient taking differently: Take 5 mg by mouth Every Night.) 90 tablet 3   • calcium carbonate-vitamin d (CALTRATE 600+D) 600-400 MG-UNIT per tablet Take 1 tablet by mouth daily.     • gabapentin (NEURONTIN) 300 MG capsule TAKE ONE CAPSULE BY MOUTH EVERY MORNING AND take TWO capsules BY MOUTH AT BEDTIME (Patient taking differently: 2 (two) times a day.) 270 capsule 1   • lisinopril (PRINIVIL,ZESTRIL) 20 MG tablet Take 1 tablet by mouth Daily. 90 tablet 3   • amitriptyline (ELAVIL) 25 MG tablet Take 1 tablet by mouth Every Night. 90 tablet 3   • HYDROcodone-acetaminophen (NORCO) 5-325 MG per tablet Take 1 tablet by mouth Every 6 (Six) Hours As Needed for Moderate Pain (4-6). 120 tablet 0   • metroNIDAZOLE (METROCREAM) 0.75 % cream Apply  topically to the appropriate area as directed 2 (Two) Times a Day. 45 g 2   • sulfamethoxazole-trimethoprim (BACTRIM DS,SEPTRA DS) 800-160 MG per tablet Take 1 tablet by mouth 2 (Two) Times a Day. 14 tablet 0     No facility-administered medications prior to visit.       Review of Systems   Constitutional: Negative for fatigue.   Gastrointestinal: Negative for bowel incontinence.   Genitourinary: Negative for bladder incontinence.   Musculoskeletal: Positive for arthritis, back pain and stiffness. Negative for joint swelling.   Neurological: Negative for paresthesias.     I have reviewed 12 systems with patient. Findings were negative except what is noted below and/or in history of present illness.     Objective   Visit Vitals  /70   Pulse 73   Ht 170.2 cm (67\")   Wt 75 kg (165 lb 6.4 oz)   SpO2 99%   BMI 25.91 kg/m²     Physical Exam  Vitals and nursing note reviewed.   Constitutional:       General: She is not in acute distress.     Appearance: She is well-developed.   HENT:      Head: Normocephalic and atraumatic.      Nose: Nose normal.   Eyes:      General:         Right eye: No discharge.         Left eye: No discharge.      Conjunctiva/sclera: Conjunctivae " normal.      Pupils: Pupils are equal, round, and reactive to light.   Neck:      Thyroid: No thyromegaly.   Cardiovascular:      Rate and Rhythm: Normal rate and regular rhythm.      Heart sounds: Normal heart sounds.   Pulmonary:      Effort: Pulmonary effort is normal.      Breath sounds: Normal breath sounds.   Musculoskeletal:      Cervical back: Decreased range of motion.      Lumbar back: Tenderness present. Decreased range of motion.   Lymphadenopathy:      Cervical: No cervical adenopathy.   Skin:     General: Skin is warm and dry.   Neurological:      Mental Status: She is alert and oriented to person, place, and time.         Notes brought forward are reviewed and updated if indicated.     Assessment/Plan   Problems Addressed this Visit        Musculoskeletal and Injuries    Degenerative joint disease involving multiple joints on both sides of body (Chronic)    Relevant Medications    amitriptyline (ELAVIL) 25 MG tablet    HYDROcodone-acetaminophen (NORCO) 5-325 MG per tablet       Neuro    Degenerative disc disease, lumbar (Chronic)    Relevant Medications    amitriptyline (ELAVIL) 25 MG tablet    HYDROcodone-acetaminophen (NORCO) 5-325 MG per tablet    Cervical disc disease (Chronic)      Other Visit Diagnoses     UTI (urinary tract infection), uncomplicated    -  Primary    Relevant Medications    cefprozil (CEFZIL) 500 MG tablet    Other Relevant Orders    Urinalysis With Culture If Indicated -      Diagnoses       Codes Comments    UTI (urinary tract infection), uncomplicated    -  Primary ICD-10-CM: N39.0  ICD-9-CM: 599.0     Degenerative disc disease, lumbar     ICD-10-CM: M51.36  ICD-9-CM: 722.52     Cervical disc disease     ICD-10-CM: M50.90  ICD-9-CM: 722.91     Degenerative joint disease involving multiple joints on both sides of body     ICD-10-CM: M15.9  ICD-9-CM: 715.89           Will notify regarding results. Continue current medications. Ricki reviewed and appropriate. Not recommended to  drive or operate heavy equipment while taking potentially sedating meds.  Patient understands the risks associated with this controlled medication, including tolerance and addiction. They also agree to obtain this medication only from me, and not from a another provider, unless that provider is covering for me in my absence. They also agree to be compliant in dosing, and not self adjust the dose of medication.  A signed controlled substance agreement is on file, and they have received a controlled substance education sheet at this or a previous visit. They have also signed a consent for treatment with a controlled substance as per [facility] policy.      New Medications Ordered This Visit   Medications   • amitriptyline (ELAVIL) 25 MG tablet     Sig: Take 1 tablet by mouth Every Night.     Dispense:  90 tablet     Refill:  3   • HYDROcodone-acetaminophen (NORCO) 5-325 MG per tablet     Sig: Take 1 tablet by mouth Every 6 (Six) Hours As Needed for Moderate Pain (4-6).     Dispense:  120 tablet     Refill:  0   • cefprozil (CEFZIL) 500 MG tablet     Sig: Take 1 tablet by mouth 2 (Two) Times a Day.     Dispense:  14 tablet     Refill:  0     Return in about 3 months (around 2/11/2022) for Recheck.        This document has been electronically signed by Emi Wright MD on November 11, 2021 18:05 CST

## 2021-11-12 ENCOUNTER — TELEPHONE (OUTPATIENT)
Dept: FAMILY MEDICINE CLINIC | Facility: CLINIC | Age: 76
End: 2021-11-12

## 2021-11-12 LAB
BACTERIA UR QL AUTO: ABNORMAL /HPF
BILIRUB UR QL STRIP: NEGATIVE
CLARITY UR: CLEAR
COD CRY URNS QL: ABNORMAL /HPF
COLOR UR: ABNORMAL
GLUCOSE UR STRIP-MCNC: NEGATIVE MG/DL
HGB UR QL STRIP.AUTO: NEGATIVE
HYALINE CASTS UR QL AUTO: ABNORMAL /LPF
KETONES UR QL STRIP: ABNORMAL
LEUKOCYTE ESTERASE UR QL STRIP.AUTO: ABNORMAL
NITRITE UR QL STRIP: NEGATIVE
PH UR STRIP.AUTO: 5.5 [PH] (ref 5–8)
PROT UR QL STRIP: NEGATIVE
RBC # UR: ABNORMAL /HPF
REF LAB TEST METHOD: ABNORMAL
SP GR UR STRIP: 1.03 (ref 1–1.03)
SQUAMOUS #/AREA URNS HPF: ABNORMAL /HPF
UROBILINOGEN UR QL STRIP: ABNORMAL
WBC UR QL AUTO: ABNORMAL /HPF

## 2021-11-12 NOTE — TELEPHONE ENCOUNTER
Per Dr. Wright, Ms. Nam has been called with recent lab results & recommendations.  Continue current medications and follow-up as planned or sooner if any problems.         ----- Message from Emi Wright MD sent at 11/12/2021  8:05 AM CST -----  Call and tell no UTI but urine is concentrated, needs to make sure she is drinking enough fluids.

## 2021-11-13 LAB — BACTERIA SPEC AEROBE CULT: NO GROWTH

## 2021-11-23 DIAGNOSIS — M50.90 CERVICAL DISC DISEASE: Chronic | ICD-10-CM

## 2021-11-23 DIAGNOSIS — M51.36 DEGENERATIVE DISC DISEASE, LUMBAR: Chronic | ICD-10-CM

## 2021-11-23 RX ORDER — GABAPENTIN 300 MG/1
CAPSULE ORAL
Qty: 270 CAPSULE | Refills: 1 | Status: SHIPPED | OUTPATIENT
Start: 2021-11-23 | End: 2022-02-10 | Stop reason: SDUPTHER

## 2022-02-10 ENCOUNTER — OFFICE VISIT (OUTPATIENT)
Dept: FAMILY MEDICINE CLINIC | Facility: CLINIC | Age: 77
End: 2022-02-10

## 2022-02-10 VITALS
SYSTOLIC BLOOD PRESSURE: 140 MMHG | BODY MASS INDEX: 25.94 KG/M2 | DIASTOLIC BLOOD PRESSURE: 80 MMHG | WEIGHT: 165.3 LBS | HEART RATE: 63 BPM | HEIGHT: 67 IN | OXYGEN SATURATION: 97 %

## 2022-02-10 DIAGNOSIS — M15.9 DEGENERATIVE JOINT DISEASE INVOLVING MULTIPLE JOINTS ON BOTH SIDES OF BODY: Chronic | ICD-10-CM

## 2022-02-10 DIAGNOSIS — M51.36 DEGENERATIVE DISC DISEASE, LUMBAR: Chronic | ICD-10-CM

## 2022-02-10 DIAGNOSIS — I10 ESSENTIAL HYPERTENSION: Chronic | ICD-10-CM

## 2022-02-10 DIAGNOSIS — M50.90 CERVICAL DISC DISEASE: Chronic | ICD-10-CM

## 2022-02-10 PROCEDURE — 99214 OFFICE O/P EST MOD 30 MIN: CPT | Performed by: GENERAL PRACTICE

## 2022-02-10 RX ORDER — LISINOPRIL 20 MG/1
20 TABLET ORAL DAILY
Qty: 90 TABLET | Refills: 3 | Status: SHIPPED | OUTPATIENT
Start: 2022-02-10 | End: 2023-02-09 | Stop reason: SDUPTHER

## 2022-02-10 RX ORDER — HYDROCODONE BITARTRATE AND ACETAMINOPHEN 5; 325 MG/1; MG/1
1 TABLET ORAL EVERY 6 HOURS PRN
Qty: 120 TABLET | Refills: 0 | Status: SHIPPED | OUTPATIENT
Start: 2022-02-10 | End: 2022-04-07 | Stop reason: SDUPTHER

## 2022-02-10 RX ORDER — GABAPENTIN 300 MG/1
CAPSULE ORAL
Qty: 360 CAPSULE | Refills: 1 | Status: SHIPPED | OUTPATIENT
Start: 2022-02-10 | End: 2022-06-02 | Stop reason: SDUPTHER

## 2022-02-10 NOTE — PROGRESS NOTES
Subjective   Jaleesa Nam is a 77 y.o. female.   Chief Complaint   Patient presents with   • Hypertension   • Fibromyalgia     For review and evaluation of management of chronic medical problems. Records reviewed. Recent labs, xrays reviewed and medications reconciled.    Hypertension  This is a chronic problem. The current episode started more than 1 year ago. The problem is unchanged. The problem is controlled. There are no associated agents to hypertension. Current antihypertension treatment includes calcium channel blockers and ACE inhibitors. The current treatment provides significant improvement. There are no compliance problems.    Back Pain  This is a new problem. The current episode started more than 1 month ago. The problem occurs constantly. The problem has been gradually worsening since onset. The pain is present in the gluteal and lumbar spine. The quality of the pain is described as aching. The pain does not radiate. The pain is at a severity of 8/10. The pain is moderate. The pain is the same all the time. The symptoms are aggravated by bending and sitting. Stiffness is present all day. Pertinent negatives include no bladder incontinence, bowel incontinence, paresthesias or perianal numbness. Risk factors include history of cancer. She has tried analgesics for the symptoms. The treatment provided significant relief.   Arthritis  Presents for follow-up visit. She complains of pain, stiffness and joint warmth. The symptoms have been stable. Affected locations include the right shoulder, left shoulder, left MCP, right MCP, left knee, right knee, right hip, left hip and neck. Her pain is at a severity of 8/10. Associated symptoms include pain at night. Pertinent negatives include no Raynaud's syndrome. Compliance with total regimen is %. Compliance with medications is %.      The following portions of the patient's history were reviewed and updated as appropriate: allergies, current  "medications, past social history and problem list.    Outpatient Medications Prior to Visit   Medication Sig Dispense Refill   • amitriptyline (ELAVIL) 25 MG tablet Take 1 tablet by mouth Every Night. 90 tablet 3   • amLODIPine (NORVASC) 5 MG tablet Take 1 tablet by mouth Daily. (Patient taking differently: Take 5 mg by mouth Every Night.) 90 tablet 3   • calcium carbonate-vitamin d (CALTRATE 600+D) 600-400 MG-UNIT per tablet Take 1 tablet by mouth daily.     • promethazine (PHENERGAN) 25 MG tablet Take 1 tablet by mouth Every 6 (Six) Hours As Needed for Nausea or Vomiting. 20 tablet 0   • gabapentin (NEURONTIN) 300 MG capsule TAKE ONE CAPSULE BY MOUTH EVERY MORNING AND TWO CAPSULES AT BEDTIME 270 capsule 1   • HYDROcodone-acetaminophen (NORCO) 5-325 MG per tablet Take 1 tablet by mouth Every 6 (Six) Hours As Needed for Moderate Pain (4-6). 120 tablet 0   • lisinopril (PRINIVIL,ZESTRIL) 20 MG tablet Take 1 tablet by mouth Daily. 90 tablet 3   • ondansetron ODT (Zofran ODT) 8 MG disintegrating tablet Place 1 tablet on the tongue Every 8 (Eight) Hours As Needed for Nausea or Vomiting. 20 tablet 0     No facility-administered medications prior to visit.       Review of Systems   Gastrointestinal: Negative for bowel incontinence.   Genitourinary: Negative for bladder incontinence.   Musculoskeletal: Positive for arthritis, back pain and stiffness.   Neurological: Negative for paresthesias.     I have reviewed 12 systems with patient. Findings were negative except what is noted below and/or in history of present illness.     Objective   Visit Vitals  /80   Pulse 63   Ht 170.2 cm (67\")   Wt 75 kg (165 lb 4.8 oz)   SpO2 97%   BMI 25.89 kg/m²     Physical Exam  Vitals and nursing note reviewed.   Constitutional:       General: She is not in acute distress.     Appearance: She is well-developed.   HENT:      Head: Normocephalic and atraumatic.      Nose: Nose normal.   Eyes:      General:         Right eye: No " discharge.         Left eye: No discharge.      Conjunctiva/sclera: Conjunctivae normal.      Pupils: Pupils are equal, round, and reactive to light.   Neck:      Thyroid: No thyromegaly.   Cardiovascular:      Rate and Rhythm: Normal rate and regular rhythm.      Heart sounds: Normal heart sounds.   Pulmonary:      Effort: Pulmonary effort is normal.      Breath sounds: Normal breath sounds.   Musculoskeletal:      Cervical back: Decreased range of motion.      Lumbar back: Tenderness present. Decreased range of motion.   Lymphadenopathy:      Cervical: No cervical adenopathy.   Skin:     General: Skin is warm and dry.   Neurological:      Mental Status: She is alert and oriented to person, place, and time.       Notes brought forward are reviewed and updated if indicated.     Assessment/Plan   Problems Addressed this Visit        Cardiac and Vasculature    Essential hypertension (Chronic)    Relevant Medications    lisinopril (PRINIVIL,ZESTRIL) 20 MG tablet       Musculoskeletal and Injuries    Degenerative joint disease involving multiple joints on both sides of body (Chronic)    Relevant Medications    HYDROcodone-acetaminophen (NORCO) 5-325 MG per tablet       Neuro    Degenerative disc disease, lumbar (Chronic)    Relevant Medications    gabapentin (NEURONTIN) 300 MG capsule    HYDROcodone-acetaminophen (NORCO) 5-325 MG per tablet    Cervical disc disease (Chronic)    Relevant Medications    gabapentin (NEURONTIN) 300 MG capsule      Diagnoses       Codes Comments    Essential hypertension     ICD-10-CM: I10  ICD-9-CM: 401.9     Degenerative disc disease, lumbar     ICD-10-CM: M51.36  ICD-9-CM: 722.52     Cervical disc disease     ICD-10-CM: M50.90  ICD-9-CM: 722.91     Degenerative joint disease involving multiple joints on both sides of body     ICD-10-CM: M15.9  ICD-9-CM: 715.89          Try increasing gabapentin to 4 a day to see if this helps with her muscle pain. Ricki reviewed and appropriate. Not  recommended to drive or operate heavy equipment while taking potentially sedating meds.  Patient understands the risks associated with this controlled medication, including tolerance and addiction. They also agree to obtain this medication only from me, and not from a another provider, unless that provider is covering for me in my absence. They also agree to be compliant in dosing, and not self adjust the dose of medication.  A signed controlled substance agreement is on file, and they have received a controlled substance education sheet at this or a previous visit. They have also signed a consent for treatment with a controlled substance as per [facility] policy.      New Medications Ordered This Visit   Medications   • lisinopril (PRINIVIL,ZESTRIL) 20 MG tablet     Sig: Take 1 tablet by mouth Daily.     Dispense:  90 tablet     Refill:  3   • gabapentin (NEURONTIN) 300 MG capsule     Sig: TAKE ONE CAPSULE BY MOUTH BID AND TWO CAPSULES AT BEDTIME     Dispense:  360 capsule     Refill:  1   • HYDROcodone-acetaminophen (NORCO) 5-325 MG per tablet     Sig: Take 1 tablet by mouth Every 6 (Six) Hours As Needed for Moderate Pain (4-6).     Dispense:  120 tablet     Refill:  0     Return in about 8 weeks (around 4/7/2022) for Recheck.        This document has been electronically signed by Emi Wright MD on February 10, 2022 17:34 CST    Answers for HPI/ROS submitted by the patient on 2/3/2022  Please describe your symptoms.: Prescription for pain  Have you had these symptoms before?: Yes  How long have you been having these symptoms?: Greater than 2 weeks  What is the primary reason for your visit?: Other

## 2022-04-07 ENCOUNTER — OFFICE VISIT (OUTPATIENT)
Dept: FAMILY MEDICINE CLINIC | Facility: CLINIC | Age: 77
End: 2022-04-07

## 2022-04-07 VITALS
BODY MASS INDEX: 25.98 KG/M2 | WEIGHT: 165.5 LBS | HEART RATE: 54 BPM | SYSTOLIC BLOOD PRESSURE: 138 MMHG | HEIGHT: 67 IN | OXYGEN SATURATION: 96 % | DIASTOLIC BLOOD PRESSURE: 70 MMHG

## 2022-04-07 DIAGNOSIS — I10 ESSENTIAL HYPERTENSION: ICD-10-CM

## 2022-04-07 DIAGNOSIS — M15.9 DEGENERATIVE JOINT DISEASE INVOLVING MULTIPLE JOINTS ON BOTH SIDES OF BODY: Chronic | ICD-10-CM

## 2022-04-07 DIAGNOSIS — M51.36 DEGENERATIVE DISC DISEASE, LUMBAR: Chronic | ICD-10-CM

## 2022-04-07 PROCEDURE — 99214 OFFICE O/P EST MOD 30 MIN: CPT | Performed by: GENERAL PRACTICE

## 2022-04-07 RX ORDER — HYDROCODONE BITARTRATE AND ACETAMINOPHEN 5; 325 MG/1; MG/1
1 TABLET ORAL EVERY 6 HOURS PRN
Qty: 120 TABLET | Refills: 0 | Status: SHIPPED | OUTPATIENT
Start: 2022-04-07 | End: 2022-06-02 | Stop reason: SDUPTHER

## 2022-05-18 ENCOUNTER — CLINICAL SUPPORT (OUTPATIENT)
Dept: AUDIOLOGY | Facility: CLINIC | Age: 77
End: 2022-05-18

## 2022-05-18 DIAGNOSIS — Z46.1 ENCOUNTER FOR FITTING OR ADJUSTMENT OF HEARING AID: Primary | ICD-10-CM

## 2022-05-18 PROCEDURE — HEARINGNOCHG: Performed by: AUDIOLOGIST

## 2022-05-19 NOTE — PROGRESS NOTES
HEARING AID CHECK    Name:  Jaleesa Nam  :  1945  Age:  77 y.o.  Date of Evaluation:  2022      HISTORY    Reason for visit:  Jaleesa Nam is seen today for a hearing aid check.  Patient reports her left hearing aid is not loud enough.    Hearing aid history:  Patient is currently wearing a  in the [place] (CHEMA) hearing aid in both ears ear(s).     OFFICE VISIT    During today's visit computer was used to increase gain in left hearing aid.  She reported the sound is better.  She will return for hearing aid assistance as necessary.     It was a pleasure seeing Jaleesa Nam in Audiology today.  It is a pleasure helping Ms. Nam with her amplification needs.             This document has been electronically signed by Argenis Menchaca MS CCC-A on May 19, 2022 11:10 CDT       Argenis Menchaca MS CCC-PIETRO  Licensed Audiologist    For Billing and Codin  Hearing Aid Check, [facility] - no charge

## 2022-05-25 DIAGNOSIS — I10 ESSENTIAL HYPERTENSION: ICD-10-CM

## 2022-05-25 RX ORDER — AMLODIPINE BESYLATE 5 MG/1
TABLET ORAL
Qty: 90 TABLET | Refills: 2 | Status: SHIPPED | OUTPATIENT
Start: 2022-05-25

## 2022-06-02 ENCOUNTER — OFFICE VISIT (OUTPATIENT)
Dept: FAMILY MEDICINE CLINIC | Facility: CLINIC | Age: 77
End: 2022-06-02

## 2022-06-02 VITALS
HEART RATE: 71 BPM | HEIGHT: 67 IN | BODY MASS INDEX: 25.62 KG/M2 | WEIGHT: 163.2 LBS | DIASTOLIC BLOOD PRESSURE: 74 MMHG | SYSTOLIC BLOOD PRESSURE: 136 MMHG | OXYGEN SATURATION: 97 %

## 2022-06-02 DIAGNOSIS — M15.9 DEGENERATIVE JOINT DISEASE INVOLVING MULTIPLE JOINTS ON BOTH SIDES OF BODY: Chronic | ICD-10-CM

## 2022-06-02 DIAGNOSIS — M50.90 CERVICAL DISC DISEASE: Chronic | ICD-10-CM

## 2022-06-02 DIAGNOSIS — M51.36 DEGENERATIVE DISC DISEASE, LUMBAR: Primary | Chronic | ICD-10-CM

## 2022-06-02 DIAGNOSIS — Z12.11 SCREENING FOR COLON CANCER: ICD-10-CM

## 2022-06-02 PROBLEM — D64.9 ANEMIA: Status: ACTIVE | Noted: 2022-06-02

## 2022-06-02 PROBLEM — H91.90 HEARING LOSS: Status: ACTIVE | Noted: 2022-06-02

## 2022-06-02 PROBLEM — M79.7 FIBROMYALGIA: Status: ACTIVE | Noted: 2022-06-02

## 2022-06-02 PROBLEM — I10 HYPERTENSIVE DISORDER: Status: ACTIVE | Noted: 2022-06-02

## 2022-06-02 PROBLEM — C50.919 MALIGNANT TUMOR OF BREAST: Status: ACTIVE | Noted: 2022-06-02

## 2022-06-02 PROBLEM — M62.9 DISORDER OF MUSCLE: Status: ACTIVE | Noted: 2022-06-02

## 2022-06-02 PROBLEM — R01.1 HEART MURMUR: Status: ACTIVE | Noted: 2022-06-02

## 2022-06-02 PROBLEM — K21.9 ACID REFLUX: Status: ACTIVE | Noted: 2022-06-02

## 2022-06-02 PROCEDURE — 99214 OFFICE O/P EST MOD 30 MIN: CPT | Performed by: GENERAL PRACTICE

## 2022-06-02 RX ORDER — HYDROCODONE BITARTRATE AND ACETAMINOPHEN 5; 325 MG/1; MG/1
1 TABLET ORAL EVERY 6 HOURS PRN
Qty: 120 TABLET | Refills: 0 | Status: SHIPPED | OUTPATIENT
Start: 2022-06-02 | End: 2022-07-21 | Stop reason: SDUPTHER

## 2022-06-02 RX ORDER — GABAPENTIN 300 MG/1
CAPSULE ORAL
Qty: 360 CAPSULE | Refills: 1 | Status: SHIPPED | OUTPATIENT
Start: 2022-06-02 | End: 2022-12-15 | Stop reason: SDUPTHER

## 2022-06-02 NOTE — PROGRESS NOTES
Subjective   Jaleesa Nam is a 77 y.o. female.   Chief Complaint   Patient presents with   • Neck Pain   • Back Pain   • Osteoarthritis     For review and evaluation of management of chronic medical problems. Records reviewed. Recent labs, xrays reviewed and medications reconciled.  Is having more back pain.  Neck pain has resolved with surgery although does have stiffness.  Is also having joint pain particularly right knee. Due for colon cancer screening.    Back Pain  This is a new problem. The current episode started more than 1 month ago. The problem occurs constantly. The problem has been gradually worsening since onset. The pain is present in the gluteal and lumbar spine. The quality of the pain is described as aching. The pain does not radiate. The pain is at a severity of 8/10. The pain is moderate. The pain is the same all the time. The symptoms are aggravated by bending and sitting. Stiffness is present all day. Pertinent negatives include no bladder incontinence, bowel incontinence, paresthesias or perianal numbness. Risk factors include history of cancer. She has tried analgesics for the symptoms. The treatment provided significant relief.   Arthritis  Presents for follow-up visit. She complains of pain, stiffness and joint warmth. The symptoms have been stable. Affected locations include the right shoulder, left shoulder, left MCP, right MCP, left knee, right knee, right hip, left hip and neck. Her pain is at a severity of 8/10. Associated symptoms include pain at night. Pertinent negatives include no Raynaud's syndrome. Compliance with total regimen is %. Compliance with medications is %.      The following portions of the patient's history were reviewed and updated as appropriate: allergies, current medications, past social history and problem list.    Outpatient Medications Prior to Visit   Medication Sig Dispense Refill   • amitriptyline (ELAVIL) 25 MG tablet Take 1 tablet by  "mouth Every Night. 90 tablet 3   • amLODIPine (NORVASC) 5 MG tablet TAKE ONE TABLET BY MOUTH EVERY DAY 90 tablet 2   • calcium carbonate-vitamin d 600-400 MG-UNIT per tablet Take 1 tablet by mouth daily.     • lisinopril (PRINIVIL,ZESTRIL) 20 MG tablet Take 1 tablet by mouth Daily. 90 tablet 3   • gabapentin (NEURONTIN) 300 MG capsule TAKE ONE CAPSULE BY MOUTH BID AND TWO CAPSULES AT BEDTIME 360 capsule 1   • HYDROcodone-acetaminophen (NORCO) 5-325 MG per tablet Take 1 tablet by mouth Every 6 (Six) Hours As Needed for Moderate Pain (4-6). 120 tablet 0   • promethazine (PHENERGAN) 25 MG tablet Take 1 tablet by mouth Every 6 (Six) Hours As Needed for Nausea or Vomiting. 20 tablet 0     No facility-administered medications prior to visit.       Review of Systems   Gastrointestinal: Negative for bowel incontinence.   Genitourinary: Negative for bladder incontinence.   Musculoskeletal: Positive for arthritis, back pain and stiffness.   Neurological: Negative for paresthesias.     I have reviewed 12 systems with patient. Findings were negative except what is noted below and/or in history of present illness.     Objective   Visit Vitals  /74   Pulse 71   Ht 170.2 cm (67\")   Wt 74 kg (163 lb 3.2 oz)   SpO2 97%   BMI 25.56 kg/m²     Physical Exam  Vitals and nursing note reviewed.   Constitutional:       General: She is not in acute distress.     Appearance: She is well-developed.   HENT:      Head: Normocephalic and atraumatic.      Nose: Nose normal.   Eyes:      General:         Right eye: No discharge.         Left eye: No discharge.      Conjunctiva/sclera: Conjunctivae normal.      Pupils: Pupils are equal, round, and reactive to light.   Neck:      Thyroid: No thyromegaly.   Cardiovascular:      Rate and Rhythm: Normal rate and regular rhythm.      Heart sounds: Normal heart sounds.   Pulmonary:      Effort: Pulmonary effort is normal.      Breath sounds: Normal breath sounds.   Musculoskeletal:      Cervical " back: Decreased range of motion.      Lumbar back: Tenderness present. Decreased range of motion.      Right knee: Tenderness present.      Comments: Joint changes consistent with osteoarthritis   Lymphadenopathy:      Cervical: No cervical adenopathy.   Skin:     General: Skin is warm and dry.   Neurological:      Mental Status: She is alert and oriented to person, place, and time.       Notes brought forward are reviewed and updated if indicated.     Assessment & Plan   Problems Addressed this Visit        Musculoskeletal and Injuries    Degenerative joint disease involving multiple joints on both sides of body (Chronic)    Relevant Medications    HYDROcodone-acetaminophen (NORCO) 5-325 MG per tablet       Neuro    Degenerative disc disease, lumbar - Primary (Chronic)    Relevant Medications    gabapentin (NEURONTIN) 300 MG capsule    HYDROcodone-acetaminophen (NORCO) 5-325 MG per tablet    Cervical disc disease (Chronic)    Relevant Medications    gabapentin (NEURONTIN) 300 MG capsule      Other Visit Diagnoses     Screening for colon cancer        Relevant Orders    Cologuard - Stool, Per Rectum      Diagnoses       Codes Comments    Degenerative disc disease, lumbar    -  Primary ICD-10-CM: M51.36  ICD-9-CM: 722.52     Screening for colon cancer     ICD-10-CM: Z12.11  ICD-9-CM: V76.51     Cervical disc disease     ICD-10-CM: M50.90  ICD-9-CM: 722.91     Degenerative joint disease involving multiple joints on both sides of body     ICD-10-CM: M15.9  ICD-9-CM: 715.89            New Medications Ordered This Visit   Medications   • gabapentin (NEURONTIN) 300 MG capsule     Sig: TAKE ONE CAPSULE BY MOUTH BID AND TWO CAPSULES AT BEDTIME     Dispense:  360 capsule     Refill:  1   • HYDROcodone-acetaminophen (NORCO) 5-325 MG per tablet     Sig: Take 1 tablet by mouth Every 6 (Six) Hours As Needed for Moderate Pain (4-6).     Dispense:  120 tablet     Refill:  0     Return in about 8 weeks (around 7/28/2022) for  Recheck.        This document has been electronically signed by Emi Wright MD on June 2, 2022 12:36 CDT

## 2022-06-28 ENCOUNTER — TELEPHONE (OUTPATIENT)
Dept: FAMILY MEDICINE CLINIC | Facility: CLINIC | Age: 77
End: 2022-06-28

## 2022-07-11 ENCOUNTER — TELEPHONE (OUTPATIENT)
Dept: FAMILY MEDICINE CLINIC | Facility: CLINIC | Age: 77
End: 2022-07-11

## 2022-07-11 NOTE — TELEPHONE ENCOUNTER
Jaleesa isn't able to get on her My Chart because her computer crashed and wanting her Cologard results.

## 2022-07-21 ENCOUNTER — OFFICE VISIT (OUTPATIENT)
Dept: FAMILY MEDICINE CLINIC | Facility: CLINIC | Age: 77
End: 2022-07-21

## 2022-07-21 VITALS
HEIGHT: 67 IN | SYSTOLIC BLOOD PRESSURE: 134 MMHG | HEART RATE: 60 BPM | OXYGEN SATURATION: 97 % | WEIGHT: 167 LBS | DIASTOLIC BLOOD PRESSURE: 60 MMHG | BODY MASS INDEX: 26.21 KG/M2

## 2022-07-21 DIAGNOSIS — M51.36 DEGENERATIVE DISC DISEASE, LUMBAR: Primary | ICD-10-CM

## 2022-07-21 DIAGNOSIS — M15.9 DEGENERATIVE JOINT DISEASE INVOLVING MULTIPLE JOINTS ON BOTH SIDES OF BODY: Chronic | ICD-10-CM

## 2022-07-21 PROCEDURE — 96372 THER/PROPH/DIAG INJ SC/IM: CPT | Performed by: GENERAL PRACTICE

## 2022-07-21 PROCEDURE — 99213 OFFICE O/P EST LOW 20 MIN: CPT | Performed by: GENERAL PRACTICE

## 2022-07-21 RX ORDER — TRIAMCINOLONE ACETONIDE 40 MG/ML
80 INJECTION, SUSPENSION INTRA-ARTICULAR; INTRAMUSCULAR ONCE
Status: COMPLETED | OUTPATIENT
Start: 2022-07-21 | End: 2022-07-21

## 2022-07-21 RX ORDER — HYDROCODONE BITARTRATE AND ACETAMINOPHEN 5; 325 MG/1; MG/1
1 TABLET ORAL EVERY 6 HOURS PRN
Qty: 120 TABLET | Refills: 0 | Status: SHIPPED | OUTPATIENT
Start: 2022-07-21 | End: 2022-09-01 | Stop reason: SDUPTHER

## 2022-07-21 RX ADMIN — TRIAMCINOLONE ACETONIDE 80 MG: 40 INJECTION, SUSPENSION INTRA-ARTICULAR; INTRAMUSCULAR at 11:42

## 2022-07-21 NOTE — PROGRESS NOTES
Subjective   Jaleesa Nam is a 77 y.o. female.   Chief Complaint   Patient presents with   • Degenerative disc disease     For review and evaluation of management of chronic medical problems. Records reviewed. Recent labs, xrays reviewed and medications reconciled.  Has been having more pain all over in the last week or so.  Not sure if it might be weather related.  Is having to take more than 2 hydrocodone a day at times.  Back Pain  This is a new problem. The current episode started more than 1 month ago. The problem occurs constantly. The problem has been gradually worsening since onset. The pain is present in the gluteal and lumbar spine. The quality of the pain is described as aching. The pain does not radiate. The pain is at a severity of 8/10. The pain is moderate. The pain is the same all the time. The symptoms are aggravated by bending and sitting. Stiffness is present all day. Pertinent negatives include no bladder incontinence, bowel incontinence, paresthesias or perianal numbness. Risk factors include history of cancer. She has tried analgesics for the symptoms. The treatment provided significant relief.   Arthritis  Presents for follow-up visit. She complains of pain, stiffness and joint warmth. The symptoms have been stable. Affected locations include the right shoulder, left shoulder, left MCP, right MCP, left knee, right knee, right hip, left hip and neck. Her pain is at a severity of 8/10. Associated symptoms include pain at night. Pertinent negatives include no Raynaud's syndrome. Compliance with total regimen is %. Compliance with medications is %.      The following portions of the patient's history were reviewed and updated as appropriate: allergies, current medications, past social history and problem list.    Outpatient Medications Prior to Visit   Medication Sig Dispense Refill   • amitriptyline (ELAVIL) 25 MG tablet Take 1 tablet by mouth Every Night. 90 tablet 3   •  "amLODIPine (NORVASC) 5 MG tablet TAKE ONE TABLET BY MOUTH EVERY DAY 90 tablet 2   • calcium carbonate-vitamin d 600-400 MG-UNIT per tablet Take 1 tablet by mouth daily.     • gabapentin (NEURONTIN) 300 MG capsule TAKE ONE CAPSULE BY MOUTH BID AND TWO CAPSULES AT BEDTIME 360 capsule 1   • lisinopril (PRINIVIL,ZESTRIL) 20 MG tablet Take 1 tablet by mouth Daily. 90 tablet 3   • HYDROcodone-acetaminophen (NORCO) 5-325 MG per tablet Take 1 tablet by mouth Every 6 (Six) Hours As Needed for Moderate Pain (4-6). 120 tablet 0     No facility-administered medications prior to visit.       Review of Systems   Gastrointestinal: Negative for bowel incontinence.   Genitourinary: Negative for bladder incontinence.   Musculoskeletal: Positive for arthritis, back pain and stiffness.   Neurological: Negative for paresthesias.     I have reviewed 12 systems with patient. Findings were negative except what is noted below and/or in history of present illness.     Objective   Visit Vitals  /60 (BP Location: Left arm, Patient Position: Sitting)   Pulse 60   Ht 170.2 cm (67\")   Wt 75.8 kg (167 lb)   SpO2 97%   BMI 26.16 kg/m²     Physical Exam  Vitals and nursing note reviewed.   Constitutional:       General: She is not in acute distress.     Appearance: She is well-developed.   HENT:      Head: Normocephalic and atraumatic.      Nose: Nose normal.   Eyes:      General:         Right eye: No discharge.         Left eye: No discharge.      Conjunctiva/sclera: Conjunctivae normal.      Pupils: Pupils are equal, round, and reactive to light.   Neck:      Thyroid: No thyromegaly.   Cardiovascular:      Rate and Rhythm: Normal rate and regular rhythm.      Heart sounds: Normal heart sounds.   Pulmonary:      Effort: Pulmonary effort is normal.      Breath sounds: Normal breath sounds.   Musculoskeletal:      Cervical back: Decreased range of motion.      Lumbar back: Tenderness present. Decreased range of motion.      Right knee: " Tenderness present.      Comments: Joint changes consistent with osteoarthritis   Lymphadenopathy:      Cervical: No cervical adenopathy.   Skin:     General: Skin is warm and dry.   Neurological:      Mental Status: She is alert and oriented to person, place, and time.         Notes brought forward are reviewed and updated if indicated.     Assessment & Plan   Problems Addressed this Visit        Musculoskeletal and Injuries    Degenerative joint disease involving multiple joints on both sides of body (Chronic)    Relevant Medications    HYDROcodone-acetaminophen (NORCO) 5-325 MG per tablet       Neuro    Degenerative disc disease, lumbar - Primary (Chronic)    Relevant Medications    triamcinolone acetonide (KENALOG-40) injection 80 mg (Completed)    HYDROcodone-acetaminophen (NORCO) 5-325 MG per tablet      Diagnoses       Codes Comments    Degenerative disc disease, lumbar    -  Primary ICD-10-CM: M51.36  ICD-9-CM: 722.52     Degenerative joint disease involving multiple joints on both sides of body     ICD-10-CM: M15.9  ICD-9-CM: 715.89          Steroid injection today to see if this will help calm things down for her.  Continue on current medications.Instructions given regarding proper use and potential risks and side effects. Advised to recheck if is having any issues with this medication.  Ricki reviewed and appropriate. Not recommended to drive or operate heavy equipment while taking potentially sedating meds.  Patient understands the risks associated with this controlled medication, including tolerance and addiction. They also agree to obtain this medication only from me, and not from a another provider, unless that provider is covering for me in my absence. They also agree to be compliant in dosing, and not self adjust the dose of medication.  A signed controlled substance agreement is on file, and they have received a controlled substance education sheet at this or a previous visit. They have also  signed a consent for treatment with a controlled substance as per [facility] policy.      New Medications Ordered This Visit   Medications   • triamcinolone acetonide (KENALOG-40) injection 80 mg   • HYDROcodone-acetaminophen (NORCO) 5-325 MG per tablet     Sig: Take 1 tablet by mouth Every 6 (Six) Hours As Needed for Moderate Pain (4-6).     Dispense:  120 tablet     Refill:  0     Return in about 6 weeks (around 9/1/2022).        This document has been electronically signed by Emi Wright MD on July 21, 2022 12:29 CDT     Initial (On Arrival)

## 2022-09-01 ENCOUNTER — OFFICE VISIT (OUTPATIENT)
Dept: FAMILY MEDICINE CLINIC | Facility: CLINIC | Age: 77
End: 2022-09-01

## 2022-09-01 VITALS
WEIGHT: 168.9 LBS | DIASTOLIC BLOOD PRESSURE: 70 MMHG | HEART RATE: 65 BPM | BODY MASS INDEX: 26.51 KG/M2 | OXYGEN SATURATION: 98 % | HEIGHT: 67 IN | SYSTOLIC BLOOD PRESSURE: 142 MMHG

## 2022-09-01 DIAGNOSIS — M15.9 DEGENERATIVE JOINT DISEASE INVOLVING MULTIPLE JOINTS ON BOTH SIDES OF BODY: Chronic | ICD-10-CM

## 2022-09-01 DIAGNOSIS — M51.36 DEGENERATIVE DISC DISEASE, LUMBAR: ICD-10-CM

## 2022-09-01 PROCEDURE — 99213 OFFICE O/P EST LOW 20 MIN: CPT | Performed by: GENERAL PRACTICE

## 2022-09-01 RX ORDER — HYDROCODONE BITARTRATE AND ACETAMINOPHEN 5; 325 MG/1; MG/1
1 TABLET ORAL EVERY 6 HOURS PRN
Qty: 120 TABLET | Refills: 0 | Status: SHIPPED | OUTPATIENT
Start: 2022-09-01 | End: 2022-10-13 | Stop reason: SDUPTHER

## 2022-09-01 NOTE — PROGRESS NOTES
Subjective   Jaleesa Nam is a 77 y.o. female.   Chief Complaint   Patient presents with   • degenerative disc lumbar     For review and evaluation of management of chronic medical problems. Records reviewed. Any recent labs, xrays reviewed and medications reconciled.   Back Pain  This is a new problem. The current episode started more than 1 month ago. The problem occurs constantly. The problem has been gradually worsening since onset. The pain is present in the gluteal and lumbar spine. The quality of the pain is described as aching. The pain does not radiate. The pain is at a severity of 8/10. The pain is moderate. The pain is the same all the time. The symptoms are aggravated by bending and sitting. Stiffness is present all day. Pertinent negatives include no bladder incontinence, bowel incontinence, paresthesias or perianal numbness. Risk factors include history of cancer. She has tried analgesics for the symptoms. The treatment provided significant relief.   Arthritis  Presents for follow-up visit. She complains of pain, stiffness and joint warmth. The symptoms have been stable. Affected locations include the right shoulder, left shoulder, left MCP, right MCP, left knee, right knee, right hip, left hip and neck. Her pain is at a severity of 8/10. Associated symptoms include pain at night. Pertinent negatives include no Raynaud's syndrome. Compliance with total regimen is %. Compliance with medications is %.      The following portions of the patient's history were reviewed and updated as appropriate: allergies, current medications, past social history and problem list.    Outpatient Medications Prior to Visit   Medication Sig Dispense Refill   • amitriptyline (ELAVIL) 25 MG tablet Take 1 tablet by mouth Every Night. 90 tablet 3   • amLODIPine (NORVASC) 5 MG tablet TAKE ONE TABLET BY MOUTH EVERY DAY 90 tablet 2   • calcium carbonate-vitamin d 600-400 MG-UNIT per tablet Take 1 tablet by mouth  "daily.     • gabapentin (NEURONTIN) 300 MG capsule TAKE ONE CAPSULE BY MOUTH BID AND TWO CAPSULES AT BEDTIME 360 capsule 1   • lisinopril (PRINIVIL,ZESTRIL) 20 MG tablet Take 1 tablet by mouth Daily. 90 tablet 3   • HYDROcodone-acetaminophen (NORCO) 5-325 MG per tablet Take 1 tablet by mouth Every 6 (Six) Hours As Needed for Moderate Pain (4-6). 120 tablet 0     No facility-administered medications prior to visit.       Review of Systems   Gastrointestinal: Negative for bowel incontinence.   Genitourinary: Negative for bladder incontinence.   Musculoskeletal: Positive for arthritis, back pain and stiffness.   Neurological: Negative for paresthesias.     I have reviewed 12 systems with patient. Findings were negative except what is noted below and/or in history of present illness.     Objective   Visit Vitals  /70   Pulse 65   Ht 170.2 cm (67\")   Wt 76.6 kg (168 lb 14.4 oz)   SpO2 98%   BMI 26.45 kg/m²     Physical Exam  Vitals and nursing note reviewed.   Constitutional:       General: She is not in acute distress.     Appearance: She is well-developed.   HENT:      Head: Normocephalic and atraumatic.      Nose: Nose normal.   Eyes:      General:         Right eye: No discharge.         Left eye: No discharge.      Conjunctiva/sclera: Conjunctivae normal.      Pupils: Pupils are equal, round, and reactive to light.   Neck:      Thyroid: No thyromegaly.   Cardiovascular:      Rate and Rhythm: Normal rate and regular rhythm.      Heart sounds: Normal heart sounds.   Pulmonary:      Effort: Pulmonary effort is normal.      Breath sounds: Normal breath sounds.   Musculoskeletal:      Cervical back: Decreased range of motion.      Lumbar back: Tenderness present. Decreased range of motion.      Right knee: Tenderness present.      Comments: Joint changes consistent with osteoarthritis   Lymphadenopathy:      Cervical: No cervical adenopathy.   Skin:     General: Skin is warm and dry.   Neurological:      Mental " Status: She is alert and oriented to person, place, and time.       Notes brought forward are reviewed and updated if indicated.     Assessment & Plan   Problems Addressed this Visit        Musculoskeletal and Injuries    Degenerative joint disease involving multiple joints on both sides of body (Chronic)    Relevant Medications    HYDROcodone-acetaminophen (NORCO) 5-325 MG per tablet       Neuro    Degenerative disc disease, lumbar (Chronic)    Relevant Medications    HYDROcodone-acetaminophen (NORCO) 5-325 MG per tablet      Diagnoses       Codes Comments    Degenerative disc disease, lumbar     ICD-10-CM: M51.36  ICD-9-CM: 722.52     Degenerative joint disease involving multiple joints on both sides of body     ICD-10-CM: M15.9  ICD-9-CM: 715.89           Continue current medications. Ricki reviewed and appropriate. Not recommended to drive or operate heavy equipment while taking potentially sedating meds.  Patient understands the risks associated with this controlled medication, including tolerance and addiction. They also agree to obtain this medication only from me, and not from a another provider, unless that provider is covering for me in my absence. They also agree to be compliant in dosing, and not self adjust the dose of medication.  A signed controlled substance agreement is on file, and they have received a controlled substance education sheet at this or a previous visit. They have also signed a consent for treatment with a controlled substance as per [facility] policy.      New Medications Ordered This Visit   Medications   • HYDROcodone-acetaminophen (NORCO) 5-325 MG per tablet     Sig: Take 1 tablet by mouth Every 6 (Six) Hours As Needed for Moderate Pain (4-6).     Dispense:  120 tablet     Refill:  0     Return in about 6 weeks (around 10/13/2022) for Recheck.        This document has been electronically signed by Emi Wright MD on September 1, 2022 14:14 CDT

## 2022-10-13 ENCOUNTER — OFFICE VISIT (OUTPATIENT)
Dept: FAMILY MEDICINE CLINIC | Facility: CLINIC | Age: 77
End: 2022-10-13

## 2022-10-13 VITALS
HEART RATE: 74 BPM | OXYGEN SATURATION: 98 % | WEIGHT: 166.9 LBS | DIASTOLIC BLOOD PRESSURE: 70 MMHG | SYSTOLIC BLOOD PRESSURE: 140 MMHG | HEIGHT: 67 IN | BODY MASS INDEX: 26.19 KG/M2

## 2022-10-13 DIAGNOSIS — I10 ESSENTIAL HYPERTENSION: Chronic | ICD-10-CM

## 2022-10-13 DIAGNOSIS — M15.9 DEGENERATIVE JOINT DISEASE INVOLVING MULTIPLE JOINTS ON BOTH SIDES OF BODY: Primary | Chronic | ICD-10-CM

## 2022-10-13 DIAGNOSIS — Z23 NEED FOR IMMUNIZATION AGAINST INFLUENZA: ICD-10-CM

## 2022-10-13 DIAGNOSIS — Z00.00 MEDICARE ANNUAL WELLNESS VISIT, SUBSEQUENT: ICD-10-CM

## 2022-10-13 DIAGNOSIS — M51.36 DEGENERATIVE DISC DISEASE, LUMBAR: Chronic | ICD-10-CM

## 2022-10-13 PROCEDURE — 99213 OFFICE O/P EST LOW 20 MIN: CPT | Performed by: GENERAL PRACTICE

## 2022-10-13 RX ORDER — HYDROCODONE BITARTRATE AND ACETAMINOPHEN 5; 325 MG/1; MG/1
1 TABLET ORAL EVERY 6 HOURS PRN
Qty: 120 TABLET | Refills: 0 | Status: SHIPPED | OUTPATIENT
Start: 2022-10-13 | End: 2022-11-17 | Stop reason: SDUPTHER

## 2022-10-20 DIAGNOSIS — Z23 NEED FOR IMMUNIZATION AGAINST INFLUENZA: Primary | ICD-10-CM

## 2022-10-20 PROCEDURE — G0008 ADMIN INFLUENZA VIRUS VAC: HCPCS | Performed by: GENERAL PRACTICE

## 2022-10-20 PROCEDURE — 90662 IIV NO PRSV INCREASED AG IM: CPT | Performed by: GENERAL PRACTICE

## 2022-10-28 ENCOUNTER — HOSPITAL ENCOUNTER (OUTPATIENT)
Dept: ULTRASOUND IMAGING | Facility: HOSPITAL | Age: 77
Discharge: HOME | End: 2022-10-28

## 2022-10-28 ENCOUNTER — LAB (OUTPATIENT)
Dept: LAB | Facility: HOSPITAL | Age: 77
End: 2022-10-28

## 2022-10-28 ENCOUNTER — OFFICE VISIT (OUTPATIENT)
Dept: FAMILY MEDICINE CLINIC | Facility: CLINIC | Age: 77
End: 2022-10-28

## 2022-10-28 VITALS
HEIGHT: 67 IN | BODY MASS INDEX: 26.84 KG/M2 | RESPIRATION RATE: 18 BRPM | WEIGHT: 171 LBS | DIASTOLIC BLOOD PRESSURE: 82 MMHG | SYSTOLIC BLOOD PRESSURE: 148 MMHG | HEART RATE: 59 BPM | OXYGEN SATURATION: 97 %

## 2022-10-28 DIAGNOSIS — L03.115 CELLULITIS OF RIGHT LOWER EXTREMITY: Primary | ICD-10-CM

## 2022-10-28 DIAGNOSIS — L03.115 CELLULITIS OF RIGHT LOWER EXTREMITY: ICD-10-CM

## 2022-10-28 DIAGNOSIS — R30.0 DYSURIA: ICD-10-CM

## 2022-10-28 DIAGNOSIS — M79.89 RIGHT LEG SWELLING: ICD-10-CM

## 2022-10-28 DIAGNOSIS — M79.89 RIGHT LEG SWELLING: Primary | ICD-10-CM

## 2022-10-28 PROCEDURE — 87186 SC STD MICRODIL/AGAR DIL: CPT | Performed by: GENERAL PRACTICE

## 2022-10-28 PROCEDURE — 81001 URINALYSIS AUTO W/SCOPE: CPT | Performed by: GENERAL PRACTICE

## 2022-10-28 PROCEDURE — 87077 CULTURE AEROBIC IDENTIFY: CPT | Performed by: GENERAL PRACTICE

## 2022-10-28 PROCEDURE — 93971 EXTREMITY STUDY: CPT

## 2022-10-28 PROCEDURE — 87086 URINE CULTURE/COLONY COUNT: CPT | Performed by: GENERAL PRACTICE

## 2022-10-28 PROCEDURE — 99213 OFFICE O/P EST LOW 20 MIN: CPT | Performed by: GENERAL PRACTICE

## 2022-10-28 RX ORDER — SULFAMETHOXAZOLE AND TRIMETHOPRIM 800; 160 MG/1; MG/1
1 TABLET ORAL 2 TIMES DAILY
Qty: 6 TABLET | Refills: 0 | Status: SHIPPED | OUTPATIENT
Start: 2022-10-28 | End: 2022-11-17

## 2022-10-28 RX ORDER — CLINDAMYCIN HYDROCHLORIDE 300 MG/1
300 CAPSULE ORAL 3 TIMES DAILY
Qty: 21 CAPSULE | Refills: 0 | Status: SHIPPED | OUTPATIENT
Start: 2022-10-28 | End: 2022-11-17

## 2022-10-28 NOTE — PROGRESS NOTES
"Subjective   Jaleesa Nam is a 77 y.o. female.   Chief Complaint   Patient presents with   • Edema     L ankle/leg recheck      Leg Swelling  The current episode started 1 to 4 weeks ago. The problem occurs constantly. The problem has been gradually worsening. Pertinent negatives include no chills or fever. Associated symptoms comments: Healing wound right lower leg. Nothing aggravates the symptoms. She has tried position changes and ice for the symptoms. The treatment provided mild relief.   also had some dysuria yesterday, wondering if she has a uti.     The following portions of the patient's history were reviewed and updated as appropriate: allergies, current medications, past social history and problem list.    Outpatient Medications Prior to Visit   Medication Sig Dispense Refill   • amitriptyline (ELAVIL) 25 MG tablet Take 1 tablet by mouth Every Night. 90 tablet 3   • amLODIPine (NORVASC) 5 MG tablet TAKE ONE TABLET BY MOUTH EVERY DAY 90 tablet 2   • calcium carbonate-vitamin d 600-400 MG-UNIT per tablet Take 1 tablet by mouth daily.     • gabapentin (NEURONTIN) 300 MG capsule TAKE ONE CAPSULE BY MOUTH BID AND TWO CAPSULES AT BEDTIME 360 capsule 1   • HYDROcodone-acetaminophen (NORCO) 5-325 MG per tablet Take 1 tablet by mouth Every 6 (Six) Hours As Needed for Moderate Pain (4-6). 120 tablet 0   • lisinopril (PRINIVIL,ZESTRIL) 20 MG tablet Take 1 tablet by mouth Daily. 90 tablet 3   • bacitracin 500 UNIT/GM ointment Apply 1 application topically to the appropriate area as directed 2 (Two) Times a Day. 28 g 0     No facility-administered medications prior to visit.       Review of Systems   Constitutional: Negative for chills and fever.     I have reviewed 12 systems with patient. Findings were negative except what is noted below and/or in history of present illness.     Objective   Visit Vitals  /82   Pulse 59   Resp 18   Ht 170.2 cm (67\")   Wt 77.6 kg (171 lb)   SpO2 97%   BMI 26.78 kg/m² "     Physical Exam  Vitals reviewed.   Constitutional:       Appearance: She is well-developed.   HENT:      Head: Normocephalic and atraumatic.   Eyes:      Conjunctiva/sclera: Conjunctivae normal.      Pupils: Pupils are equal, round, and reactive to light.   Pulmonary:      Effort: Pulmonary effort is normal.   Musculoskeletal:        Legs:    Neurological:      Mental Status: She is alert and oriented to person, place, and time.         Notes brought forward are reviewed and updated if indicated.     Assessment & Plan   Problems Addressed this Visit    None  Visit Diagnoses     Right leg swelling    -  Primary    Relevant Orders    US Venous Doppler Lower Extremity Right (duplex) (Completed)    Dysuria        Relevant Orders    Urinalysis With Culture If Indicated -    Cellulitis of right lower extremity          Diagnoses       Codes Comments    Right leg swelling    -  Primary ICD-10-CM: M79.89  ICD-9-CM: 729.81     Dysuria     ICD-10-CM: R30.0  ICD-9-CM: 788.1     Cellulitis of right lower extremity     ICD-10-CM: L03.115  ICD-9-CM: 682.6          Ultrasound neg for DVT. Start clindamycin along with a probiotic. Recheck if not improving.      New Medications Ordered This Visit   Medications   • sulfamethoxazole-trimethoprim (BACTRIM DS,SEPTRA DS) 800-160 MG per tablet     Sig: Take 1 tablet by mouth 2 (Two) Times a Day.     Dispense:  6 tablet     Refill:  0     No follow-ups on file.        This document has been electronically signed by Emi Wright MD on October 28, 2022 12:10 CDT

## 2022-10-29 LAB
BACTERIA UR QL AUTO: ABNORMAL /HPF
BILIRUB UR QL STRIP: NEGATIVE
CLARITY UR: ABNORMAL
COLOR UR: YELLOW
GLUCOSE UR STRIP-MCNC: NEGATIVE MG/DL
HGB UR QL STRIP.AUTO: ABNORMAL
HYALINE CASTS UR QL AUTO: ABNORMAL /LPF
KETONES UR QL STRIP: NEGATIVE
LEUKOCYTE ESTERASE UR QL STRIP.AUTO: ABNORMAL
NITRITE UR QL STRIP: POSITIVE
PH UR STRIP.AUTO: 6 [PH] (ref 5–8)
PROT UR QL STRIP: NEGATIVE
RBC # UR STRIP: ABNORMAL /HPF
REF LAB TEST METHOD: ABNORMAL
SP GR UR STRIP: 1.01 (ref 1–1.03)
SQUAMOUS #/AREA URNS HPF: ABNORMAL /HPF
UROBILINOGEN UR QL STRIP: ABNORMAL
WBC # UR STRIP: ABNORMAL /HPF

## 2022-10-31 LAB — BACTERIA SPEC AEROBE CULT: ABNORMAL

## 2022-11-07 ENCOUNTER — TELEPHONE (OUTPATIENT)
Dept: FAMILY MEDICINE CLINIC | Facility: CLINIC | Age: 77
End: 2022-11-07

## 2022-11-07 DIAGNOSIS — Z12.31 ENCOUNTER FOR SCREENING MAMMOGRAM FOR MALIGNANT NEOPLASM OF BREAST: Primary | ICD-10-CM

## 2022-11-17 ENCOUNTER — OFFICE VISIT (OUTPATIENT)
Dept: FAMILY MEDICINE CLINIC | Facility: CLINIC | Age: 77
End: 2022-11-17

## 2022-11-17 VITALS
OXYGEN SATURATION: 97 % | HEART RATE: 70 BPM | DIASTOLIC BLOOD PRESSURE: 68 MMHG | WEIGHT: 170.8 LBS | HEIGHT: 67 IN | SYSTOLIC BLOOD PRESSURE: 132 MMHG | BODY MASS INDEX: 26.81 KG/M2

## 2022-11-17 DIAGNOSIS — M51.36 DEGENERATIVE DISC DISEASE, LUMBAR: Chronic | ICD-10-CM

## 2022-11-17 DIAGNOSIS — Z00.00 MEDICARE ANNUAL WELLNESS VISIT, SUBSEQUENT: Primary | ICD-10-CM

## 2022-11-17 DIAGNOSIS — M15.9 DEGENERATIVE JOINT DISEASE INVOLVING MULTIPLE JOINTS ON BOTH SIDES OF BODY: Chronic | ICD-10-CM

## 2022-11-17 PROCEDURE — 1159F MED LIST DOCD IN RCRD: CPT | Performed by: GENERAL PRACTICE

## 2022-11-17 PROCEDURE — G0439 PPPS, SUBSEQ VISIT: HCPCS | Performed by: GENERAL PRACTICE

## 2022-11-17 PROCEDURE — 1125F AMNT PAIN NOTED PAIN PRSNT: CPT | Performed by: GENERAL PRACTICE

## 2022-11-17 PROCEDURE — 1170F FXNL STATUS ASSESSED: CPT | Performed by: GENERAL PRACTICE

## 2022-11-17 RX ORDER — AMITRIPTYLINE HYDROCHLORIDE 25 MG/1
25 TABLET, FILM COATED ORAL NIGHTLY
Qty: 90 TABLET | Refills: 3 | Status: SHIPPED | OUTPATIENT
Start: 2022-11-17

## 2022-11-17 RX ORDER — HYDROCODONE BITARTRATE AND ACETAMINOPHEN 5; 325 MG/1; MG/1
1 TABLET ORAL EVERY 6 HOURS PRN
Qty: 120 TABLET | Refills: 0 | Status: SHIPPED | OUTPATIENT
Start: 2022-11-17 | End: 2022-12-15 | Stop reason: SDUPTHER

## 2022-11-17 NOTE — PROGRESS NOTES
The ABCs of the Annual Wellness Visit  Subsequent Medicare Wellness Visit    Chief Complaint   Patient presents with   • Medicare Wellness-subsequent   • degenerative joint disease      Subjective    History of Present Illness:  Jaleesa Nam is a 77 y.o. female who presents for a Subsequent Medicare Wellness Visit. Due for mammogram.  Labs pending.     The following portions of the patient's history were reviewed and   updated as appropriate: allergies, current medications, past family history, past medical history, past social history, past surgical history and problem list.    Compared to one year ago, the patient feels her physical   health is worse.    Compared to one year ago, the patient feels her mental   health is the same.    Recent Hospitalizations:  She was not admitted to the hospital during the last year.       Current Medical Providers:  Patient Care Team:  Emi Wright MD as PCP - General    Outpatient Medications Prior to Visit   Medication Sig Dispense Refill   • amLODIPine (NORVASC) 5 MG tablet TAKE ONE TABLET BY MOUTH EVERY DAY 90 tablet 2   • calcium carbonate-vitamin d 600-400 MG-UNIT per tablet Take 1 tablet by mouth daily.     • gabapentin (NEURONTIN) 300 MG capsule TAKE ONE CAPSULE BY MOUTH BID AND TWO CAPSULES AT BEDTIME 360 capsule 1   • lisinopril (PRINIVIL,ZESTRIL) 20 MG tablet Take 1 tablet by mouth Daily. 90 tablet 3   • amitriptyline (ELAVIL) 25 MG tablet Take 1 tablet by mouth Every Night. 90 tablet 3   • HYDROcodone-acetaminophen (NORCO) 5-325 MG per tablet Take 1 tablet by mouth Every 6 (Six) Hours As Needed for Moderate Pain (4-6). 120 tablet 0   • bacitracin 500 UNIT/GM ointment Apply 1 application topically to the appropriate area as directed 2 (Two) Times a Day. 28 g 0   • clindamycin (Cleocin) 300 MG capsule Take 1 capsule by mouth 3 (Three) Times a Day. Take probiotic 21 capsule 0   • sulfamethoxazole-trimethoprim (BACTRIM DS,SEPTRA DS) 800-160 MG per tablet Take  "1 tablet by mouth 2 (Two) Times a Day. 6 tablet 0     No facility-administered medications prior to visit.       Opioid medication/s are on active medication list.  and I have evaluated her active treatment plan and pain score trends (see table).  Vitals:    11/17/22 0932   PainSc:   9   PainLoc: Comment: neck     I have reviewed the chart for potential of high risk medication and harmful drug interactions in the elderly.            Aspirin is not on active medication list.  Aspirin use is not indicated based on review of current medical condition/s. Risk of harm outweighs potential benefits.  .    Patient Active Problem List   Diagnosis   • Degenerative joint disease involving multiple joints on both sides of body   • Essential hypertension   • Degenerative disc disease, lumbar   • Foot pain, left   • Cervical disc disease   • Osteopenia of neck of left femur   • Rosacea   • Acute colitis   • Acute pharyngitis   • Hypertensive disorder   • Malignant tumor of breast (HCC)   • Hepatitis A virus infection   • Heart murmur   • Hearing loss   • Fibromyalgia   • Anemia   • Acid reflux   • Disorder of muscle     Advance Care Planning  Advance Directive is on file.  ACP discussion was held with the patient during this visit. Patient has an advance directive in EMR which is still valid.           Objective    Vitals:    11/17/22 0932 11/17/22 0953   BP: 140/70 132/68   Pulse: 70    SpO2: 97%    Weight: 77.5 kg (170 lb 12.8 oz)    Height: 170.2 cm (67\")    PainSc:   9    PainLoc: Comment: neck      Estimated body mass index is 26.75 kg/m² as calculated from the following:    Height as of this encounter: 170.2 cm (67\").    Weight as of this encounter: 77.5 kg (170 lb 12.8 oz).    BMI is >= 25 and <30. (Overweight) The following options were offered after discussion;: exercise counseling/recommendations and nutrition counseling/recommendations      Does the patient have evidence of cognitive impairment? No    Physical " Exam  Vitals and nursing note reviewed.   Constitutional:       General: She is not in acute distress.     Appearance: She is well-developed.   HENT:      Head: Normocephalic and atraumatic.      Nose: Nose normal.   Eyes:      General:         Right eye: No discharge.         Left eye: No discharge.      Conjunctiva/sclera: Conjunctivae normal.      Pupils: Pupils are equal, round, and reactive to light.   Neck:      Thyroid: No thyromegaly.      Trachea: No tracheal deviation.   Cardiovascular:      Rate and Rhythm: Normal rate and regular rhythm.      Heart sounds: Normal heart sounds. No murmur heard.  Pulmonary:      Effort: Pulmonary effort is normal. No respiratory distress.      Breath sounds: Normal breath sounds. No wheezing or rales.   Chest:      Chest wall: No tenderness.   Breasts:     Right: No inverted nipple, mass, nipple discharge, skin change or tenderness.      Left: No inverted nipple, mass, nipple discharge, skin change or tenderness.   Abdominal:      General: Bowel sounds are normal. There is no distension.      Palpations: Abdomen is soft. There is no mass.      Tenderness: There is no abdominal tenderness.      Hernia: No hernia is present.   Musculoskeletal:         General: No deformity. Normal range of motion.   Lymphadenopathy:      Cervical: No cervical adenopathy.   Skin:     General: Skin is warm and dry.   Neurological:      Mental Status: She is alert and oriented to person, place, and time.      Deep Tendon Reflexes: Reflexes are normal and symmetric.   Psychiatric:         Behavior: Behavior normal.         Thought Content: Thought content normal.         Judgment: Judgment normal.       HEALTH RISK ASSESSMENT    Smoking Status:  Social History     Tobacco Use   Smoking Status Never   Smokeless Tobacco Never     Alcohol Consumption:  Social History     Substance and Sexual Activity   Alcohol Use No     Fall Risk Screen:    STEADI Fall Risk Assessment was completed, and patient is  at HIGH risk for falls. Assessment completed on:11/17/2022    Depression Screening:  PHQ-2/PHQ-9 Depression Screening 11/17/2022   Retired PHQ-9 Total Score -   Retired Total Score -   Little Interest or Pleasure in Doing Things 0-->not at all   Feeling Down, Depressed or Hopeless 0-->not at all   PHQ-9: Brief Depression Severity Measure Score 0       Health Habits and Functional and Cognitive Screening:  Functional & Cognitive Status 11/17/2022   Do you have difficulty preparing food and eating? No   Do you have difficulty bathing yourself, getting dressed or grooming yourself? No   Do you have difficulty using the toilet? No   Do you have difficulty moving around from place to place? No   Do you have trouble with steps or getting out of a bed or a chair? No   Current Diet Well Balanced Diet   Dental Exam Up to date   Eye Exam Up to date   Exercise (times per week) 0 times per week   Current Exercises Include No Regular Exercise   Current Exercise Activities Include -   Do you need help using the phone?  No   Are you deaf or do you have serious difficulty hearing?  Yes   Do you need help with transportation? No   Do you need help shopping? No   Do you need help preparing meals?  No   Do you need help with housework?  No   Do you need help with laundry? No   Do you need help taking your medications? No   Do you need help managing money? No   Do you ever drive or ride in a car without wearing a seat belt? No   Have you felt unusual stress, anger or loneliness in the last month? No   Who do you live with? Spouse   If you need help, do you have trouble finding someone available to you? No   Have you been bothered in the last four weeks by sexual problems? No   Do you have difficulty concentrating, remembering or making decisions? Yes       Age-appropriate Screening Schedule:  Refer to the list below for future screening recommendations based on patient's age, sex and/or medical conditions. Orders for these recommended  tests are listed in the plan section. The patient has been provided with a written plan.    Health Maintenance   Topic Date Due   • ZOSTER VACCINE (2 of 2) 08/07/2017   • LIPID PANEL  09/28/2022   • MAMMOGRAM  11/11/2023   • DXA SCAN  11/11/2023   • TDAP/TD VACCINES (3 - Td or Tdap) 10/26/2028   • INFLUENZA VACCINE  Completed              Assessment & Plan   CMS Preventative Services Quick Reference  Risk Factors Identified During Encounter  Chronic Pain   Fall Risk-High or Moderate  Immunizations Discussed/Encouraged (specific Immunizations; Shingrix  Obesity/Overweight   The above risks/problems have been discussed with the patient.  Follow up actions/plans if indicated are seen below in the Assessment/Plan Section.  Pertinent information has been shared with the patient in the After Visit Summary.    Diagnoses and all orders for this visit:    1. Medicare annual wellness visit, subsequent (Primary)    2. Degenerative disc disease, lumbar  -     HYDROcodone-acetaminophen (NORCO) 5-325 MG per tablet; Take 1 tablet by mouth Every 6 (Six) Hours As Needed for Moderate Pain (4-6).  Dispense: 120 tablet; Refill: 0  -     amitriptyline (ELAVIL) 25 MG tablet; Take 1 tablet by mouth Every Night.  Dispense: 90 tablet; Refill: 3    3. Degenerative joint disease involving multiple joints on both sides of body  -     HYDROcodone-acetaminophen (NORCO) 5-325 MG per tablet; Take 1 tablet by mouth Every 6 (Six) Hours As Needed for Moderate Pain (4-6).  Dispense: 120 tablet; Refill: 0  -     amitriptyline (ELAVIL) 25 MG tablet; Take 1 tablet by mouth Every Night.  Dispense: 90 tablet; Refill: 3        Follow Up:   Return in about 4 weeks (around 12/15/2022) for Recheck.   Will notify regarding results. Ricki reviewed and appropriate. Not recommended to drive or operate heavy equipment while taking potentially sedating meds.  Patient understands the risks associated with this controlled medication, including tolerance and  addiction. They also agree to obtain this medication only from me, and not from a another provider, unless that provider is covering for me in my absence. They also agree to be compliant in dosing, and not self adjust the dose of medication.  A signed controlled substance agreement is on file, and they have received a controlled substance education sheet at this or a previous visit. They have also signed a consent for treatment with a controlled substance as per [facility] policy.      An After Visit Summary and PPPS were made available to the patient.  Information has been scanned into chart. Discussed importance of taking medications as prescribed. Encouraged healthy eating habits with low fat, low salt choices and working towards maintaining a healthy weight. Recommended regular exercise if able as well as care to prevent falls including avoiding anything on the floor that they could slip or trip on such as throw rugs, making sure they have a bathmat to step onto when their feet are wet and having grab bars and railings where needed.

## 2022-11-17 NOTE — PATIENT INSTRUCTIONS
Check at pharmacy on Shingrix shingles vaccine    Medicare Wellness  Personal Prevention Plan of Service     Date of Office Visit:    Encounter Provider:  Emi Wright MD  Place of Service:  [facility]  Patient Name: Jaleesa Nam  :  1945    As part of the Medicare Wellness portion of your visit today, we are providing you with this personalized preventive plan of services (PPPS). This plan is based upon recommendations of the [place] Preventive Services Task Force (USPSTF) and the Advisory Committee on Immunization Practices (ACIP).    This lists the preventive care services that should be considered, and provides dates of when you are due. Items listed as completed are up-to-date and do not require any further intervention.    Health Maintenance   Topic Date Due    ZOSTER VACCINE (2 of 2) 2017    LIPID PANEL  2022    ANNUAL WELLNESS VISIT  2022    MAMMOGRAM  2023    DXA SCAN  2023    COLORECTAL CANCER SCREENING  2025    TDAP/TD VACCINES (3 - Td or Tdap) 10/26/2028    HEPATITIS C SCREENING  Completed    COVID-19 Vaccine  Completed    INFLUENZA VACCINE  Completed    Pneumococcal Vaccine 65+  Completed       No orders of the defined types were placed in this encounter.      No follow-ups on file.

## 2022-11-18 ENCOUNTER — LAB (OUTPATIENT)
Dept: LAB | Facility: HOSPITAL | Age: 77
End: 2022-11-18

## 2022-11-18 DIAGNOSIS — Z00.00 MEDICARE ANNUAL WELLNESS VISIT, SUBSEQUENT: ICD-10-CM

## 2022-11-18 DIAGNOSIS — M51.36 DEGENERATIVE DISC DISEASE, LUMBAR: Chronic | ICD-10-CM

## 2022-11-18 DIAGNOSIS — I10 ESSENTIAL HYPERTENSION: ICD-10-CM

## 2022-11-18 DIAGNOSIS — M15.9 DEGENERATIVE JOINT DISEASE INVOLVING MULTIPLE JOINTS ON BOTH SIDES OF BODY: Chronic | ICD-10-CM

## 2022-11-18 PROCEDURE — 80307 DRUG TEST PRSMV CHEM ANLYZR: CPT

## 2022-11-18 PROCEDURE — 36415 COLL VENOUS BLD VENIPUNCTURE: CPT

## 2022-11-18 PROCEDURE — 81001 URINALYSIS AUTO W/SCOPE: CPT

## 2022-11-18 PROCEDURE — 85025 COMPLETE CBC W/AUTO DIFF WBC: CPT

## 2022-11-18 PROCEDURE — G0481 DRUG TEST DEF 8-14 CLASSES: HCPCS

## 2022-11-18 PROCEDURE — 80061 LIPID PANEL: CPT

## 2022-11-18 PROCEDURE — 85007 BL SMEAR W/DIFF WBC COUNT: CPT

## 2022-11-18 PROCEDURE — 80053 COMPREHEN METABOLIC PANEL: CPT

## 2022-11-19 LAB
ALBUMIN SERPL-MCNC: 4.3 G/DL (ref 3.5–5.2)
ALBUMIN/GLOB SERPL: 1.6 G/DL
ALP SERPL-CCNC: 98 U/L (ref 39–117)
ALT SERPL W P-5'-P-CCNC: 17 U/L (ref 1–33)
ANION GAP SERPL CALCULATED.3IONS-SCNC: 13 MMOL/L (ref 5–15)
ANISOCYTOSIS BLD QL: ABNORMAL
AST SERPL-CCNC: 33 U/L (ref 1–32)
BACTERIA UR QL AUTO: ABNORMAL /HPF
BASOPHILS # BLD MANUAL: 0.13 10*3/MM3 (ref 0–0.2)
BASOPHILS NFR BLD MANUAL: 2.1 % (ref 0–1.5)
BILIRUB SERPL-MCNC: 0.5 MG/DL (ref 0–1.2)
BILIRUB UR QL STRIP: NEGATIVE
BUN SERPL-MCNC: 16 MG/DL (ref 8–23)
BUN/CREAT SERPL: 15.5 (ref 7–25)
CALCIUM SPEC-SCNC: 10.4 MG/DL (ref 8.6–10.5)
CHLORIDE SERPL-SCNC: 103 MMOL/L (ref 98–107)
CHOLEST SERPL-MCNC: 162 MG/DL (ref 0–200)
CLARITY UR: CLEAR
CO2 SERPL-SCNC: 23 MMOL/L (ref 22–29)
COLOR UR: YELLOW
CREAT SERPL-MCNC: 1.03 MG/DL (ref 0.57–1)
DEPRECATED RDW RBC AUTO: 42.3 FL (ref 37–54)
EGFRCR SERPLBLD CKD-EPI 2021: 56.1 ML/MIN/1.73
EOSINOPHIL # BLD MANUAL: 0.25 10*3/MM3 (ref 0–0.4)
EOSINOPHIL NFR BLD MANUAL: 4.2 % (ref 0.3–6.2)
ERYTHROCYTE [DISTWIDTH] IN BLOOD BY AUTOMATED COUNT: 12 % (ref 12.3–15.4)
GLOBULIN UR ELPH-MCNC: 2.7 GM/DL
GLUCOSE SERPL-MCNC: 107 MG/DL (ref 65–99)
GLUCOSE UR STRIP-MCNC: NEGATIVE MG/DL
HCT VFR BLD AUTO: 43.6 % (ref 34–46.6)
HDLC SERPL-MCNC: 47 MG/DL (ref 40–60)
HGB BLD-MCNC: 14.7 G/DL (ref 12–15.9)
HGB UR QL STRIP.AUTO: NEGATIVE
HYALINE CASTS UR QL AUTO: ABNORMAL /LPF
KETONES UR QL STRIP: NEGATIVE
LDLC SERPL CALC-MCNC: 93 MG/DL (ref 0–100)
LDLC/HDLC SERPL: 1.91 {RATIO}
LEUKOCYTE ESTERASE UR QL STRIP.AUTO: ABNORMAL
LYMPHOCYTES # BLD MANUAL: 4.48 10*3/MM3 (ref 0.7–3.1)
LYMPHOCYTES NFR BLD MANUAL: 7.3 % (ref 5–12)
MCH RBC QN AUTO: 32.2 PG (ref 26.6–33)
MCHC RBC AUTO-ENTMCNC: 33.7 G/DL (ref 31.5–35.7)
MCV RBC AUTO: 95.6 FL (ref 79–97)
MICROCYTES BLD QL: ABNORMAL
MONOCYTES # BLD: 0.44 10*3/MM3 (ref 0.1–0.9)
NEUTROPHILS # BLD AUTO: 0.69 10*3/MM3 (ref 1.7–7)
NEUTROPHILS NFR BLD MANUAL: 11.5 % (ref 42.7–76)
NITRITE UR QL STRIP: NEGATIVE
NRBC BLD AUTO-RTO: 0.2 /100 WBC (ref 0–0.2)
OVALOCYTES BLD QL SMEAR: ABNORMAL
PH UR STRIP.AUTO: 6.5 [PH] (ref 5–8)
PLAT MORPH BLD: NORMAL
PLATELET # BLD AUTO: 389 10*3/MM3 (ref 140–450)
PMV BLD AUTO: 10.3 FL (ref 6–12)
POTASSIUM SERPL-SCNC: 4.2 MMOL/L (ref 3.5–5.2)
PROT SERPL-MCNC: 7 G/DL (ref 6–8.5)
PROT UR QL STRIP: NEGATIVE
RBC # BLD AUTO: 4.56 10*6/MM3 (ref 3.77–5.28)
RBC # UR STRIP: ABNORMAL /HPF
REF LAB TEST METHOD: ABNORMAL
SODIUM SERPL-SCNC: 139 MMOL/L (ref 136–145)
SP GR UR STRIP: 1.01 (ref 1–1.03)
SQUAMOUS #/AREA URNS HPF: ABNORMAL /HPF
TARGETS BLD QL SMEAR: ABNORMAL
TRIGL SERPL-MCNC: 125 MG/DL (ref 0–150)
UROBILINOGEN UR QL STRIP: ABNORMAL
VARIANT LYMPHS NFR BLD MANUAL: 75 % (ref 19.6–45.3)
VLDLC SERPL-MCNC: 22 MG/DL (ref 5–40)
WBC # UR STRIP: ABNORMAL /HPF
WBC MORPH BLD: NORMAL
WBC NRBC COR # BLD: 5.97 10*3/MM3 (ref 3.4–10.8)

## 2022-11-28 LAB — DRUGS UR: NORMAL

## 2022-11-29 ENCOUNTER — OFFICE VISIT (OUTPATIENT)
Dept: FAMILY MEDICINE CLINIC | Facility: CLINIC | Age: 77
End: 2022-11-29

## 2022-11-29 VITALS
DIASTOLIC BLOOD PRESSURE: 78 MMHG | TEMPERATURE: 97.8 F | HEART RATE: 62 BPM | WEIGHT: 170.8 LBS | HEIGHT: 67 IN | SYSTOLIC BLOOD PRESSURE: 148 MMHG | BODY MASS INDEX: 26.81 KG/M2 | OXYGEN SATURATION: 97 %

## 2022-11-29 DIAGNOSIS — R05.9 COUGH, UNSPECIFIED TYPE: ICD-10-CM

## 2022-11-29 DIAGNOSIS — J20.9 ACUTE BRONCHITIS, UNSPECIFIED ORGANISM: Primary | ICD-10-CM

## 2022-11-29 LAB
EXPIRATION DATE: NORMAL
FLUAV AG UPPER RESP QL IA.RAPID: NOT DETECTED
FLUBV AG UPPER RESP QL IA.RAPID: NOT DETECTED
INTERNAL CONTROL: NORMAL
Lab: NORMAL
SARS-COV-2 AG UPPER RESP QL IA.RAPID: NOT DETECTED

## 2022-11-29 PROCEDURE — 87428 SARSCOV & INF VIR A&B AG IA: CPT | Performed by: GENERAL PRACTICE

## 2022-11-29 PROCEDURE — 99213 OFFICE O/P EST LOW 20 MIN: CPT | Performed by: GENERAL PRACTICE

## 2022-11-29 RX ORDER — CEFPROZIL 500 MG/1
500 TABLET, FILM COATED ORAL 2 TIMES DAILY
Qty: 14 TABLET | Refills: 0 | Status: SHIPPED | OUTPATIENT
Start: 2022-11-29 | End: 2022-12-15

## 2022-11-29 NOTE — PROGRESS NOTES
"Subjective   Jaleesa Nam is a 77 y.o. female.   Chief Complaint   Patient presents with   • possible flu         URI   This is a new problem. The current episode started in the past 7 days. The problem has been unchanged. There has been no fever. Associated symptoms include congestion and a sore throat. Associated symptoms comments: Cough  . She has tried decongestant and antihistamine (mucinex) for the symptoms. The treatment provided moderate relief.      The following portions of the patient's history were reviewed and updated as appropriate: allergies, current medications, past social history and problem list.    Outpatient Medications Prior to Visit   Medication Sig Dispense Refill   • amitriptyline (ELAVIL) 25 MG tablet Take 1 tablet by mouth Every Night. 90 tablet 3   • amLODIPine (NORVASC) 5 MG tablet TAKE ONE TABLET BY MOUTH EVERY DAY 90 tablet 2   • calcium carbonate-vitamin d 600-400 MG-UNIT per tablet Take 1 tablet by mouth daily.     • gabapentin (NEURONTIN) 300 MG capsule TAKE ONE CAPSULE BY MOUTH BID AND TWO CAPSULES AT BEDTIME 360 capsule 1   • HYDROcodone-acetaminophen (NORCO) 5-325 MG per tablet Take 1 tablet by mouth Every 6 (Six) Hours As Needed for Moderate Pain (4-6). 120 tablet 0   • lisinopril (PRINIVIL,ZESTRIL) 20 MG tablet Take 1 tablet by mouth Daily. 90 tablet 3     No facility-administered medications prior to visit.       Review of Systems   HENT: Positive for congestion and sore throat.      I have reviewed 12 systems with patient. Findings were negative except what is noted below and/or in history of present illness.     Objective   Visit Vitals  /78   Pulse 62   Temp 97.8 °F (36.6 °C) (Tympanic)   Ht 170.2 cm (67\")   Wt 77.5 kg (170 lb 12.8 oz)   SpO2 97%   BMI 26.75 kg/m²     Physical Exam  Vitals and nursing note reviewed.   Constitutional:       General: She is not in acute distress.     Appearance: She is well-developed.   HENT:      Head: Normocephalic and " atraumatic.      Nose: Nose normal.   Eyes:      General:         Right eye: No discharge.         Left eye: No discharge.      Conjunctiva/sclera: Conjunctivae normal.      Pupils: Pupils are equal, round, and reactive to light.   Neck:      Thyroid: No thyromegaly.   Cardiovascular:      Rate and Rhythm: Normal rate and regular rhythm.      Heart sounds: Normal heart sounds.   Pulmonary:      Effort: Pulmonary effort is normal.      Breath sounds: Examination of the right-middle field reveals rales. Examination of the left-middle field reveals rales. Rales present.   Lymphadenopathy:      Cervical: No cervical adenopathy.   Skin:     General: Skin is warm and dry.   Neurological:      Mental Status: She is alert and oriented to person, place, and time.       Notes brought forward are reviewed and updated if indicated.     Assessment & Plan   Problems Addressed this Visit    None  Visit Diagnoses     Acute bronchitis, unspecified organism    -  Primary    Relevant Medications    cefprozil (CEFZIL) 500 MG tablet    Cough, unspecified type          Diagnoses       Codes Comments    Acute bronchitis, unspecified organism    -  Primary ICD-10-CM: J20.9  ICD-9-CM: 466.0     Cough, unspecified type     ICD-10-CM: R05.9  ICD-9-CM: 786.2          Start antibiotic as suspect she has a secondary bacterial bronchitis. Symptomatic treatment.  Recheck if not improving  New Medications Ordered This Visit   Medications   • cefprozil (CEFZIL) 500 MG tablet     Sig: Take 1 tablet by mouth 2 (Two) Times a Day.     Dispense:  14 tablet     Refill:  0     No follow-ups on file.        This document has been electronically signed by Emi Wright MD on November 29, 2022 18:16 CST

## 2022-12-15 ENCOUNTER — OFFICE VISIT (OUTPATIENT)
Dept: FAMILY MEDICINE CLINIC | Facility: CLINIC | Age: 77
End: 2022-12-15

## 2022-12-15 VITALS
WEIGHT: 170 LBS | RESPIRATION RATE: 18 BRPM | BODY MASS INDEX: 26.68 KG/M2 | SYSTOLIC BLOOD PRESSURE: 130 MMHG | HEIGHT: 67 IN | OXYGEN SATURATION: 98 % | DIASTOLIC BLOOD PRESSURE: 72 MMHG | HEART RATE: 63 BPM

## 2022-12-15 DIAGNOSIS — M51.36 DEGENERATIVE DISC DISEASE, LUMBAR: Chronic | ICD-10-CM

## 2022-12-15 DIAGNOSIS — M50.90 CERVICAL DISC DISEASE: Chronic | ICD-10-CM

## 2022-12-15 DIAGNOSIS — M15.9 DEGENERATIVE JOINT DISEASE INVOLVING MULTIPLE JOINTS ON BOTH SIDES OF BODY: Chronic | ICD-10-CM

## 2022-12-15 DIAGNOSIS — I10 ESSENTIAL HYPERTENSION: Primary | Chronic | ICD-10-CM

## 2022-12-15 DIAGNOSIS — M79.89 LEG SWELLING: ICD-10-CM

## 2022-12-15 PROCEDURE — 99214 OFFICE O/P EST MOD 30 MIN: CPT | Performed by: GENERAL PRACTICE

## 2022-12-15 RX ORDER — GABAPENTIN 300 MG/1
CAPSULE ORAL
Qty: 360 CAPSULE | Refills: 1 | Status: SHIPPED | OUTPATIENT
Start: 2022-12-15

## 2022-12-15 RX ORDER — HYDROCODONE BITARTRATE AND ACETAMINOPHEN 5; 325 MG/1; MG/1
1 TABLET ORAL EVERY 6 HOURS PRN
Qty: 120 TABLET | Refills: 0 | Status: SHIPPED | OUTPATIENT
Start: 2022-12-15 | End: 2023-02-09 | Stop reason: SDUPTHER

## 2022-12-15 RX ORDER — TRIAMTERENE AND HYDROCHLOROTHIAZIDE 37.5; 25 MG/1; MG/1
1 CAPSULE ORAL EVERY MORNING
Qty: 90 CAPSULE | Refills: 0 | Status: SHIPPED | OUTPATIENT
Start: 2022-12-15 | End: 2023-03-10

## 2022-12-15 NOTE — PROGRESS NOTES
Subjective   Jaleesa Nam is a 77 y.o. female.   Chief Complaint   Patient presents with   • Med Refill     For review and evaluation of management of chronic medical problems. Records reviewed. Any recent labs, xrays reviewed and medications reconciled. Is having more swelling of feet and lower legs.   Back Pain  This is a new problem. The current episode started more than 1 month ago. The problem occurs constantly. The problem has been gradually worsening since onset. The pain is present in the gluteal and lumbar spine. The quality of the pain is described as aching. The pain does not radiate. The pain is at a severity of 9/10. The pain is moderate. The pain is the same all the time. The symptoms are aggravated by bending and sitting. Stiffness is present all day. Pertinent negatives include no bladder incontinence, bowel incontinence, paresthesias or perianal numbness. Risk factors include history of cancer. She has tried analgesics for the symptoms. The treatment provided significant relief.   Arthritis  Presents for follow-up visit. She complains of pain, stiffness and joint warmth. The symptoms have been stable. Affected locations include the right shoulder, left shoulder, left MCP, right MCP, left knee, right knee, right hip, left hip and neck. Her pain is at a severity of 9/10. Associated symptoms include pain at night. Pertinent negatives include no Raynaud's syndrome. Compliance with total regimen is %. Compliance with medications is %.      The following portions of the patient's history were reviewed and updated as appropriate: allergies, current medications, past social history and problem list.    Outpatient Medications Prior to Visit   Medication Sig Dispense Refill   • amitriptyline (ELAVIL) 25 MG tablet Take 1 tablet by mouth Every Night. 90 tablet 3   • amLODIPine (NORVASC) 5 MG tablet TAKE ONE TABLET BY MOUTH EVERY DAY 90 tablet 2   • calcium carbonate-vitamin d 600-400 MG-UNIT  "per tablet Take 1 tablet by mouth daily.     • lisinopril (PRINIVIL,ZESTRIL) 20 MG tablet Take 1 tablet by mouth Daily. 90 tablet 3   • gabapentin (NEURONTIN) 300 MG capsule TAKE ONE CAPSULE BY MOUTH BID AND TWO CAPSULES AT BEDTIME 360 capsule 1   • HYDROcodone-acetaminophen (NORCO) 5-325 MG per tablet Take 1 tablet by mouth Every 6 (Six) Hours As Needed for Moderate Pain (4-6). 120 tablet 0   • cefprozil (CEFZIL) 500 MG tablet Take 1 tablet by mouth 2 (Two) Times a Day. 14 tablet 0     No facility-administered medications prior to visit.       Review of Systems   Gastrointestinal: Negative for bowel incontinence.   Genitourinary: Negative for bladder incontinence.   Musculoskeletal: Positive for arthritis, back pain and stiffness.   Neurological: Negative for paresthesias.     I have reviewed 12 systems with patient. Findings were negative except what is noted below and/or in history of present illness.     Objective   Visit Vitals  /72   Pulse 63   Resp 18   Ht 170.2 cm (67\")   Wt 77.1 kg (170 lb)   SpO2 98%   BMI 26.63 kg/m²     Physical Exam  Vitals and nursing note reviewed.   Constitutional:       General: She is not in acute distress.     Appearance: She is well-developed.   HENT:      Head: Normocephalic and atraumatic.      Nose: Nose normal.   Eyes:      General:         Right eye: No discharge.         Left eye: No discharge.      Conjunctiva/sclera: Conjunctivae normal.      Pupils: Pupils are equal, round, and reactive to light.   Neck:      Thyroid: No thyromegaly.   Cardiovascular:      Rate and Rhythm: Normal rate and regular rhythm.      Heart sounds: Normal heart sounds.   Pulmonary:      Effort: Pulmonary effort is normal.      Breath sounds: Normal breath sounds.   Musculoskeletal:      Cervical back: Decreased range of motion.      Lumbar back: Tenderness present. Decreased range of motion.      Right knee: Tenderness present.      Right lower le+ Edema present.      Left lower le+ " Edema present.      Comments: Joint changes consistent with osteoarthritis   Lymphadenopathy:      Cervical: No cervical adenopathy.   Skin:     General: Skin is warm and dry.   Neurological:      Mental Status: She is alert and oriented to person, place, and time.         Notes brought forward are reviewed and updated if indicated.     Assessment & Plan   Problems Addressed this Visit        Cardiac and Vasculature    Essential hypertension - Primary (Chronic)    Relevant Medications    triamterene-hydrochlorothiazide (Dyazide) 37.5-25 MG per capsule       Musculoskeletal and Injuries    Degenerative joint disease involving multiple joints on both sides of body (Chronic)    Relevant Medications    HYDROcodone-acetaminophen (NORCO) 5-325 MG per tablet       Neuro    Degenerative disc disease, lumbar (Chronic)    Relevant Medications    gabapentin (NEURONTIN) 300 MG capsule    HYDROcodone-acetaminophen (NORCO) 5-325 MG per tablet    Cervical disc disease (Chronic)    Relevant Medications    gabapentin (NEURONTIN) 300 MG capsule   Diagnoses       Codes Comments    Essential hypertension    -  Primary ICD-10-CM: I10  ICD-9-CM: 401.9     Degenerative disc disease, lumbar     ICD-10-CM: M51.36  ICD-9-CM: 722.52     Cervical disc disease     ICD-10-CM: M50.90  ICD-9-CM: 722.91     Degenerative joint disease involving multiple joints on both sides of body     ICD-10-CM: M15.9  ICD-9-CM: 715.89             Patient Instructions   Stop amlodipine   Start Dyazide daily in the morning  Ricki reviewed and appropriate. Not recommended to drive or operate heavy equipment while taking potentially sedating meds.  Patient understands the risks associated with this controlled medication, including tolerance and addiction. They also agree to obtain this medication only from me, and not from a another provider, unless that provider is covering for me in my absence. They also agree to be compliant in dosing, and not self adjust the dose  of medication.  A signed controlled substance agreement is on file, and they have received a controlled substance education sheet at this or a previous visit. They have also signed a consent for treatment with a controlled substance as per [facility] policy.        New Medications Ordered This Visit   Medications   • triamterene-hydrochlorothiazide (Dyazide) 37.5-25 MG per capsule     Sig: Take 1 capsule by mouth Every Morning.     Dispense:  90 capsule     Refill:  0   • gabapentin (NEURONTIN) 300 MG capsule     Sig: TAKE ONE CAPSULE BY MOUTH BID AND TWO CAPSULES AT BEDTIME     Dispense:  360 capsule     Refill:  1   • HYDROcodone-acetaminophen (NORCO) 5-325 MG per tablet     Sig: Take 1 tablet by mouth Every 6 (Six) Hours As Needed for Moderate Pain (4-6).     Dispense:  120 tablet     Refill:  0     Return in about 8 weeks (around 2/9/2023) for Recheck.        This document has been electronically signed by Emi Wright MD on December 15, 2022 11:56 CST

## 2023-01-13 ENCOUNTER — OFFICE VISIT (OUTPATIENT)
Dept: FAMILY MEDICINE CLINIC | Facility: CLINIC | Age: 78
End: 2023-01-13
Payer: MEDICARE

## 2023-01-13 ENCOUNTER — TELEPHONE (OUTPATIENT)
Dept: FAMILY MEDICINE CLINIC | Facility: CLINIC | Age: 78
End: 2023-01-13
Payer: MEDICARE

## 2023-01-13 VITALS
OXYGEN SATURATION: 98 % | WEIGHT: 169 LBS | BODY MASS INDEX: 26.53 KG/M2 | TEMPERATURE: 97.8 F | HEART RATE: 61 BPM | SYSTOLIC BLOOD PRESSURE: 110 MMHG | RESPIRATION RATE: 18 BRPM | DIASTOLIC BLOOD PRESSURE: 66 MMHG | HEIGHT: 67 IN

## 2023-01-13 DIAGNOSIS — L29.9 ITCHY SKIN: Primary | ICD-10-CM

## 2023-01-13 PROCEDURE — 99213 OFFICE O/P EST LOW 20 MIN: CPT | Performed by: GENERAL PRACTICE

## 2023-01-13 NOTE — TELEPHONE ENCOUNTER
Called and advised patient that she would need to be seen for the rash so that Dr. Wright can confirm it is Shingles. Patient voiced understanding, transferred to  for scheduling.

## 2023-01-13 NOTE — PROGRESS NOTES
"Subjective   Jaleesa Nam is a 77 y.o. female.   Chief Complaint   Patient presents with   • Rash     On back      Rash  This is a new problem. The current episode started in the past 7 days. The problem has been waxing and waning since onset. The affected locations include the back. The rash is characterized by dryness and itchiness. She was exposed to nothing. Past treatments include nothing.   Worried she might have shingles.      The following portions of the patient's history were reviewed and updated as appropriate: allergies, current medications, past social history and problem list.    Outpatient Medications Prior to Visit   Medication Sig Dispense Refill   • amitriptyline (ELAVIL) 25 MG tablet Take 1 tablet by mouth Every Night. 90 tablet 3   • amLODIPine (NORVASC) 5 MG tablet TAKE ONE TABLET BY MOUTH EVERY DAY 90 tablet 2   • calcium carbonate-vitamin d 600-400 MG-UNIT per tablet Take 1 tablet by mouth daily.     • gabapentin (NEURONTIN) 300 MG capsule TAKE ONE CAPSULE BY MOUTH BID AND TWO CAPSULES AT BEDTIME 360 capsule 1   • HYDROcodone-acetaminophen (NORCO) 5-325 MG per tablet Take 1 tablet by mouth Every 6 (Six) Hours As Needed for Moderate Pain (4-6). 120 tablet 0   • lisinopril (PRINIVIL,ZESTRIL) 20 MG tablet Take 1 tablet by mouth Daily. 90 tablet 3   • triamterene-hydrochlorothiazide (Dyazide) 37.5-25 MG per capsule Take 1 capsule by mouth Every Morning. 90 capsule 0     No facility-administered medications prior to visit.       Review of Systems   Skin: Positive for rash.     I have reviewed 12 systems with patient. Findings were negative except what is noted below and/or in history of present illness.     Objective   Visit Vitals  /66   Pulse 61   Temp 97.8 °F (36.6 °C)   Resp 18   Ht 170.2 cm (67\")   Wt 76.7 kg (169 lb)   SpO2 98%   BMI 26.47 kg/m²     Physical Exam  Vitals reviewed.   Constitutional:       Appearance: She is well-developed.   HENT:      Head: Normocephalic and " atraumatic.   Eyes:      Conjunctiva/sclera: Conjunctivae normal.      Pupils: Pupils are equal, round, and reactive to light.   Pulmonary:      Effort: Pulmonary effort is normal.   Skin:         Neurological:      Mental Status: She is alert and oriented to person, place, and time.         Notes brought forward are reviewed and updated if indicated.     Assessment & Plan   Problems Addressed this Visit    None  Visit Diagnoses     Itchy skin    -  Primary      Diagnoses       Codes Comments    Itchy skin    -  Primary ICD-10-CM: L29.9  ICD-9-CM: 698.9          Advised to use good moisturizer twice a day and stay hydrated.     No orders of the defined types were placed in this encounter.    Return if symptoms worsen or fail to improve, for Next scheduled follow up.        This document has been electronically signed by Emi Wright MD on January 13, 2023 17:12 CST

## 2023-01-13 NOTE — TELEPHONE ENCOUNTER
Pt. Called because the Pt. Believes she has Shingles on her back. The Pt. Would like to know what she needs to do or if something can be prescribed to her.    You can call back at [phone]    [facility]: [facility]

## 2023-02-09 ENCOUNTER — OFFICE VISIT (OUTPATIENT)
Dept: FAMILY MEDICINE CLINIC | Facility: CLINIC | Age: 78
End: 2023-02-09
Payer: MEDICARE

## 2023-02-09 VITALS
SYSTOLIC BLOOD PRESSURE: 134 MMHG | HEART RATE: 62 BPM | BODY MASS INDEX: 26.53 KG/M2 | WEIGHT: 169 LBS | DIASTOLIC BLOOD PRESSURE: 70 MMHG | OXYGEN SATURATION: 99 % | HEIGHT: 67 IN

## 2023-02-09 DIAGNOSIS — M15.9 DEGENERATIVE JOINT DISEASE INVOLVING MULTIPLE JOINTS ON BOTH SIDES OF BODY: Chronic | ICD-10-CM

## 2023-02-09 DIAGNOSIS — M51.36 DEGENERATIVE DISC DISEASE, LUMBAR: Chronic | ICD-10-CM

## 2023-02-09 DIAGNOSIS — M79.672 LEFT FOOT PAIN: ICD-10-CM

## 2023-02-09 DIAGNOSIS — M50.90 CERVICAL DISC DISEASE: Chronic | ICD-10-CM

## 2023-02-09 DIAGNOSIS — I10 ESSENTIAL HYPERTENSION: Primary | Chronic | ICD-10-CM

## 2023-02-09 PROCEDURE — 99214 OFFICE O/P EST MOD 30 MIN: CPT | Performed by: GENERAL PRACTICE

## 2023-02-09 RX ORDER — HYDROCODONE BITARTRATE AND ACETAMINOPHEN 5; 325 MG/1; MG/1
1 TABLET ORAL EVERY 6 HOURS PRN
Qty: 120 TABLET | Refills: 0 | Status: SHIPPED | OUTPATIENT
Start: 2023-02-09 | End: 2023-04-04 | Stop reason: SDUPTHER

## 2023-02-09 RX ORDER — LISINOPRIL 20 MG/1
20 TABLET ORAL DAILY
Qty: 90 TABLET | Refills: 3 | Status: SHIPPED | OUTPATIENT
Start: 2023-02-09

## 2023-02-09 NOTE — PROGRESS NOTES
Subjective   Jaleesa Nam is a 78 y.o. female.   Chief Complaint   Patient presents with   • Fibromyalgia   • Med Refill   • Osteoarthritis     For review and evaluation of management of chronic medical problems. Records reviewed. Any recent labs, xrays reviewed and medications reconciled.   Back Pain  This is a new problem. The current episode started more than 1 month ago. The problem occurs constantly. The problem has been gradually worsening since onset. The pain is present in the gluteal and lumbar spine. The quality of the pain is described as aching. The pain does not radiate. The pain is at a severity of 8/10. The pain is moderate. The pain is the same all the time. The symptoms are aggravated by bending and sitting. Stiffness is present all day. Pertinent negatives include no bladder incontinence, bowel incontinence, paresthesias or perianal numbness. Risk factors include history of cancer. She has tried analgesics for the symptoms. The treatment provided significant relief.   Arthritis  Presents for follow-up visit. She complains of pain, stiffness and joint warmth. The symptoms have been stable. Affected locations include the right shoulder, left shoulder, left MCP, right MCP, left knee, right knee, right hip, left hip and neck. Her pain is at a severity of 8/10. Associated symptoms include pain at night. Pertinent negatives include no Raynaud's syndrome. Compliance with total regimen is %. Compliance with medications is %.   Foot Injury   The incident occurred more than 1 week ago (2 months ago dropped her iPad on her foot). The incident occurred at home. The injury mechanism was a direct blow. The pain is present in the left foot. The pain is at a severity of 8/10. The pain is severe. The pain has been constant since onset. Pertinent negatives include no inability to bear weight. The symptoms are aggravated by movement and weight bearing. Treatments tried: Hydrocodone. The treatment  "provided moderate relief.   Hypertension  This is a chronic problem. The problem is unchanged. The problem is controlled. Current antihypertension treatment includes ACE inhibitors, calcium channel blockers and diuretics. The current treatment provides significant improvement. There are no compliance problems.       The following portions of the patient's history were reviewed and updated as appropriate: allergies, current medications, past social history and problem list.    Outpatient Medications Prior to Visit   Medication Sig Dispense Refill   • amitriptyline (ELAVIL) 25 MG tablet Take 1 tablet by mouth Every Night. 90 tablet 3   • amLODIPine (NORVASC) 5 MG tablet TAKE ONE TABLET BY MOUTH EVERY DAY 90 tablet 2   • calcium carbonate-vitamin d 600-400 MG-UNIT per tablet Take 1 tablet by mouth daily.     • gabapentin (NEURONTIN) 300 MG capsule TAKE ONE CAPSULE BY MOUTH BID AND TWO CAPSULES AT BEDTIME 360 capsule 1   • triamterene-hydrochlorothiazide (Dyazide) 37.5-25 MG per capsule Take 1 capsule by mouth Every Morning. 90 capsule 0   • HYDROcodone-acetaminophen (NORCO) 5-325 MG per tablet Take 1 tablet by mouth Every 6 (Six) Hours As Needed for Moderate Pain (4-6). 120 tablet 0   • lisinopril (PRINIVIL,ZESTRIL) 20 MG tablet Take 1 tablet by mouth Daily. 90 tablet 3     No facility-administered medications prior to visit.       Review of Systems   Gastrointestinal: Negative for bowel incontinence.   Genitourinary: Negative for bladder incontinence.   Musculoskeletal: Positive for arthritis, back pain and stiffness.   Neurological: Negative for paresthesias.     I have reviewed 12 systems with patient. Findings were negative except what is noted below and/or in history of present illness.     Objective   Visit Vitals  /70   Pulse 62   Ht 170.2 cm (67\")   Wt 76.7 kg (169 lb)   SpO2 99%   BMI 26.47 kg/m²     Physical Exam  Vitals and nursing note reviewed.   Constitutional:       General: She is not in acute " distress.     Appearance: She is well-developed.   HENT:      Head: Normocephalic and atraumatic.      Nose: Nose normal.   Eyes:      General:         Right eye: No discharge.         Left eye: No discharge.      Conjunctiva/sclera: Conjunctivae normal.      Pupils: Pupils are equal, round, and reactive to light.   Neck:      Thyroid: No thyromegaly.   Cardiovascular:      Rate and Rhythm: Normal rate and regular rhythm.      Heart sounds: Normal heart sounds.   Pulmonary:      Effort: Pulmonary effort is normal.      Breath sounds: Normal breath sounds.   Musculoskeletal:      Cervical back: Decreased range of motion.      Lumbar back: Tenderness present. Decreased range of motion.      Right knee: Tenderness present.      Right lower le+ Edema present.      Left lower le+ Edema present.        Feet:       Comments: Joint changes consistent with osteoarthritis   Lymphadenopathy:      Cervical: No cervical adenopathy.   Skin:     General: Skin is warm and dry.   Neurological:      Mental Status: She is alert and oriented to person, place, and time.         Notes brought forward are reviewed and updated if indicated.     Assessment & Plan   Problems Addressed this Visit        Cardiac and Vasculature    Essential hypertension - Primary (Chronic)    Relevant Medications    lisinopril (PRINIVIL,ZESTRIL) 20 MG tablet       Musculoskeletal and Injuries    Degenerative joint disease involving multiple joints on both sides of body (Chronic)    Relevant Medications    HYDROcodone-acetaminophen (NORCO) 5-325 MG per tablet       Neuro    Degenerative disc disease, lumbar (Chronic)    Relevant Medications    HYDROcodone-acetaminophen (NORCO) 5-325 MG per tablet    Cervical disc disease (Chronic)   Other Visit Diagnoses     Left foot pain        Relevant Orders    XR Foot 3+ View Left (Completed)    Ambulatory Referral to Podiatry (Completed)      Diagnoses       Codes Comments    Essential hypertension    -  Primary  ICD-10-CM: I10  ICD-9-CM: 401.9     Left foot pain     ICD-10-CM: M79.672  ICD-9-CM: 729.5     Degenerative disc disease, lumbar     ICD-10-CM: M51.36  ICD-9-CM: 722.52     Cervical disc disease     ICD-10-CM: M50.90  ICD-9-CM: 722.91     Degenerative joint disease involving multiple joints on both sides of body     ICD-10-CM: M15.9  ICD-9-CM: 715.89           Will notify regarding results. Continue current medications. Instructions given regarding proper use and potential risks and side effects. Advised to recheck if is having any issues with this medication.  Ricki reviewed and appropriate. Not recommended to drive or operate heavy equipment while taking potentially sedating meds.  Patient understands the risks associated with this controlled medication, including tolerance and addiction. They also agree to obtain this medication only from me, and not from a another provider, unless that provider is covering for me in my absence. They also agree to be compliant in dosing, and not self adjust the dose of medication.  A signed controlled substance agreement is on file, and they have received a controlled substance education sheet at this or a previous visit. They have also signed a consent for treatment with a controlled substance as per [facility] policy.      New Medications Ordered This Visit   Medications   • lisinopril (PRINIVIL,ZESTRIL) 20 MG tablet     Sig: Take 1 tablet by mouth Daily.     Dispense:  90 tablet     Refill:  3   • HYDROcodone-acetaminophen (NORCO) 5-325 MG per tablet     Sig: Take 1 tablet by mouth Every 6 (Six) Hours As Needed for Moderate Pain (4-6).     Dispense:  120 tablet     Refill:  0     Return in about 6 weeks (around 3/23/2023) for Recheck.        This document has been electronically signed by Emi Wright MD on February 9, 2023 15:56 CST

## 2023-02-24 ENCOUNTER — OFFICE VISIT (OUTPATIENT)
Dept: PODIATRY | Facility: CLINIC | Age: 78
End: 2023-02-24
Payer: MEDICARE

## 2023-02-24 VITALS
DIASTOLIC BLOOD PRESSURE: 72 MMHG | OXYGEN SATURATION: 98 % | BODY MASS INDEX: 26.53 KG/M2 | HEART RATE: 88 BPM | HEIGHT: 67 IN | SYSTOLIC BLOOD PRESSURE: 157 MMHG | WEIGHT: 169 LBS

## 2023-02-24 DIAGNOSIS — S90.32XA CONTUSION OF LEFT FOOT, INITIAL ENCOUNTER: Primary | ICD-10-CM

## 2023-02-24 PROCEDURE — 99213 OFFICE O/P EST LOW 20 MIN: CPT | Performed by: NURSE PRACTITIONER

## 2023-02-24 NOTE — PROGRESS NOTES
Jaleesa Nam  1945  78 y.o. female        02/24/2023    Chief Complaint   Patient presents with   • Left Foot - Pain       History of Present Illness    Jaleesa Nam is a 78 y.o.female came to clinic for concern of left foot pain. Xray obtained on 2/9/2023 with Dr. Wright.       Past Medical History:   Diagnosis Date   • Abrasion     Abrasion and/or friction burn      • Acute bronchitis    • Acute laryngitis     probably viral   • Acute maxillary sinusitis    • Acute pharyngitis    • Acute serous otitis media    • Acute sinusitis    • Acute urinary tract infection    • Ankle edema    • Aortic valve regurgitation    • Astigmatism    • Breast cancer (HCC)    • Bunion    • Chest pain    • Chest pain    • Contusion of foot    • Cough    • Diastolic dysfunction    • Dyspnea    • Encounter for general adult medical examination without abnormal findings    • Encounter for routine adult health examination    • Essential hypertension    • Gastroesophageal reflux disease    • Hammer toe    • Heart murmur    • Hip pain    • Hx of radiation therapy    • Hyperlipidemia    • Low back pain    • Malaise and fatigue    • Mammogram abnormal    • Menopause    • Myopia    • Need for immunization against influenza    • Need for prophylactic vaccination against Streptococcus pneumoniae (pneumococcus)    • Nuclear cataract    • Osteoarthritis    • Palpitations    • Plantar fasciitis    • Posterior subcapsular polar senile cataract    • Primary fibromyalgia syndrome    • Sciatica    • Screening for osteoporosis    • Screening mammogram, encounter for    • Shoulder pain    • Tear film insufficiency    • Trochanteric bursitis    • Upper respiratory infection    • Urinary tract infectious disease    • Vitamin D deficiency          Past Surgical History:   Procedure Laterality Date   • BREAST BIOPSY  02/10/2011    Stereotactic breast biopsy (3)    Sterotactic location guide localization of the abnormality with wire  placement. Left lumpectomy. Faxigram.    • BREAST LUMPECTOMY     • BUNIONECTOMY Left 9/11/2019    Procedure: Reverdin bunionectomy, second hammertoe correction,  weil osteotomy                     [facility];  Surgeon: Williams Perry DPM;  Location: [facility];  Service: Podiatry   • CARDIAC CATHETERIZATION  10/01/2010    Cardiac cath 92613 (1)    Minimal plaquing in the first diagonal branch with evidence of good POORNIMA 3 flow. Preserved left ventricular systolic function with ejection fraction of 55%.    • CERVICAL SPINE SURGERY  06/09/1993    Cervical spine disk surgery (1)    C5-C6 anterior cervical discectomy and fusion with iliac bone graft.    • ENDOSCOPY  10/07/2015    Colon endoscopy [id number] (2)    negative cologard    • HIP ARTHROPLASTY  09/12/2011    Anesth, hip joint procedure (1)    Right total hip arthroplasty with adductor tenotomy. Osteoarthritis of the right hip.    • INJECTION OF MEDICATION  10/01/2014    Kenalog (4)      • INJECTION OF MEDICATION  09/03/2012    Rocephin (1)      • KNEE SURGERY  02/03/2016    Knee Surgery (1)    Left total knee arthroplasty.    • NECK SURGERY      Neck spinal fusion (1)      • NOSE SURGERY  11/18/1978    Treat nasal (nose) fracture (1) Nasal fracture reduction with splint fixation.    • OTHER SURGICAL HISTORY  06/22/2010    Correction of bunion (1)    Hallux valgus deformity of right foot. Base wedge bunionectomy of right foot.    • PAP SMEAR  12/10/2008   • SHOULDER SURGERY  06/17/2013    Shoulder arthroscopy/surgery (2)    Arthroscopy of the right shoulder with rotator cuff repair.    • TOTAL ABDOMINAL HYSTERECTOMY WITH SALPINGO OOPHORECTOMY     • TOTAL KNEE ARTHROPLASTY Left 02/03/2016         Family History   Problem Relation Age of Onset   • Coronary artery disease Other    • Hypertension Other    • Cancer Other         lung, stomach, bladder   • Arthritis Mother    • Hypertension Mother    • Heart disease Mother    • Hyperlipidemia Mother    • Other Mother    •  "Cancer Maternal Grandmother    • Colon cancer Maternal Grandmother    • Cancer Maternal Grandfather        No Known Allergies    Social History     Socioeconomic History   • Marital status:    Tobacco Use   • Smoking status: Never   • Smokeless tobacco: Never   Vaping Use   • Vaping Use: Never used   Substance and Sexual Activity   • Alcohol use: No   • Drug use: No   • Sexual activity: Defer         Current Outpatient Medications   Medication Sig Dispense Refill   • amitriptyline (ELAVIL) 25 MG tablet Take 1 tablet by mouth Every Night. 90 tablet 3   • amLODIPine (NORVASC) 5 MG tablet TAKE ONE TABLET BY MOUTH EVERY DAY 90 tablet 2   • calcium carbonate-vitamin d 600-400 MG-UNIT per tablet Take 1 tablet by mouth daily.     • gabapentin (NEURONTIN) 300 MG capsule TAKE ONE CAPSULE BY MOUTH BID AND TWO CAPSULES AT BEDTIME 360 capsule 1   • HYDROcodone-acetaminophen (NORCO) 5-325 MG per tablet Take 1 tablet by mouth Every 6 (Six) Hours As Needed for Moderate Pain (4-6). 120 tablet 0   • lisinopril (PRINIVIL,ZESTRIL) 20 MG tablet Take 1 tablet by mouth Daily. 90 tablet 3   • triamterene-hydrochlorothiazide (Dyazide) 37.5-25 MG per capsule Take 1 capsule by mouth Every Morning. 90 capsule 0     No current facility-administered medications for this visit.       Review of Systems   Musculoskeletal:        Foot pain    All other systems reviewed and are negative.        OBJECTIVE    /72   Pulse 88   Ht 170.2 cm (67\")   Wt 76.7 kg (169 lb)   SpO2 98%   BMI 26.47 kg/m²     Body mass index is 26.47 kg/m².        Physical Exam  Vitals reviewed.   Constitutional:       Appearance: Normal appearance. She is well-developed.   HENT:      Head: Normocephalic and atraumatic.   Neck:      Trachea: Trachea and phonation normal.   Cardiovascular:      Pulses:           Dorsalis pedis pulses are 1+ on the right side and 1+ on the left side.        Posterior tibial pulses are 1+ on the right side and 1+ on the left side. "   Pulmonary:      Effort: Pulmonary effort is normal. No respiratory distress.   Abdominal:      General: There is no distension.      Palpations: Abdomen is soft.   Musculoskeletal:      Right foot: Normal range of motion.      Left foot: Decreased range of motion.        Feet:    Feet:      Right foot:      Skin integrity: Skin integrity normal.      Toenail Condition: Right toenails are normal.      Left foot:      Skin integrity: Skin integrity normal.      Toenail Condition: Left toenails are normal.   Skin:     General: Skin is warm and dry.   Neurological:      Mental Status: She is alert and oriented to person, place, and time.      GCS: GCS eye subscore is 4. GCS verbal subscore is 5. GCS motor subscore is 6.   Psychiatric:         Speech: Speech normal.         Behavior: Behavior normal. Behavior is cooperative.         Thought Content: Thought content normal.         Judgment: Judgment normal.          Reviewed x-rays    Procedures        ASSESSMENT AND PLAN    Diagnoses and all orders for this visit:    1. Contusion of left foot, initial encounter (Primary)  -     MRI Foot Left Without Contrast; Future      Body mass index is 26.47 kg/m².    Recommend follow-up with primary care to discuss BMI greater than 30      Patient is modified activity weightbearing used ice and other treatment modalities over this past several weeks with no improvement in the pain is started after she dropped a heavy object on her left foot.  Will recommend continued modalities for pain relief and proceeding with an MRI of the left foot to rule out an occult fracture.  She was instructed to contact the office for any worsening symptoms.          This document has been electronically signed by Robbie SANTOS, FNP-NATHANIEL, ONP-C on February 24, 2023 14:23 CST

## 2023-03-01 ENCOUNTER — TELEPHONE (OUTPATIENT)
Dept: FAMILY MEDICINE CLINIC | Facility: CLINIC | Age: 78
End: 2023-03-01
Payer: MEDICARE

## 2023-03-01 NOTE — TELEPHONE ENCOUNTER
I called to talk with Ms Amos, Mr. Reno states She is needing something called in to the pharmacy to take before her MRI tomorrow to help relax/calm her down.     She has to be there at 10:15 Thursday 03/02/2023    Please send to [facility]

## 2023-03-02 ENCOUNTER — HOSPITAL ENCOUNTER (OUTPATIENT)
Dept: MRI IMAGING | Facility: HOSPITAL | Age: 78
Discharge: HOME | End: 2023-03-02
Admitting: NURSE PRACTITIONER
Payer: MEDICARE

## 2023-03-02 DIAGNOSIS — S90.32XA CONTUSION OF LEFT FOOT, INITIAL ENCOUNTER: ICD-10-CM

## 2023-03-02 DIAGNOSIS — F41.9 ANXIETY: Primary | ICD-10-CM

## 2023-03-02 PROCEDURE — 73718 MRI LOWER EXTREMITY W/O DYE: CPT

## 2023-03-02 RX ORDER — LORAZEPAM 0.5 MG/1
TABLET ORAL
Qty: 2 TABLET | Refills: 0 | Status: SHIPPED | OUTPATIENT
Start: 2023-03-02

## 2023-03-10 DIAGNOSIS — I10 ESSENTIAL HYPERTENSION: Chronic | ICD-10-CM

## 2023-03-10 RX ORDER — TRIAMTERENE AND HYDROCHLOROTHIAZIDE 37.5; 25 MG/1; MG/1
TABLET ORAL
Qty: 90 TABLET | Refills: 0 | Status: SHIPPED | OUTPATIENT
Start: 2023-03-10 | End: 2023-04-04 | Stop reason: SDUPTHER

## 2023-04-04 ENCOUNTER — OFFICE VISIT (OUTPATIENT)
Dept: FAMILY MEDICINE CLINIC | Facility: CLINIC | Age: 78
End: 2023-04-04
Payer: MEDICARE

## 2023-04-04 VITALS
TEMPERATURE: 97.6 F | DIASTOLIC BLOOD PRESSURE: 70 MMHG | OXYGEN SATURATION: 97 % | HEIGHT: 67 IN | HEART RATE: 62 BPM | SYSTOLIC BLOOD PRESSURE: 144 MMHG | BODY MASS INDEX: 25.47 KG/M2 | WEIGHT: 162.3 LBS

## 2023-04-04 DIAGNOSIS — M51.36 DEGENERATIVE DISC DISEASE, LUMBAR: Chronic | ICD-10-CM

## 2023-04-04 DIAGNOSIS — J20.9 ACUTE BRONCHITIS, UNSPECIFIED ORGANISM: Primary | ICD-10-CM

## 2023-04-04 DIAGNOSIS — I10 ESSENTIAL HYPERTENSION: Chronic | ICD-10-CM

## 2023-04-04 DIAGNOSIS — R09.81 HEAD CONGESTION: ICD-10-CM

## 2023-04-04 DIAGNOSIS — M15.9 DEGENERATIVE JOINT DISEASE INVOLVING MULTIPLE JOINTS ON BOTH SIDES OF BODY: Chronic | ICD-10-CM

## 2023-04-04 LAB
EXPIRATION DATE: NORMAL
INTERNAL CONTROL: NORMAL
Lab: NORMAL
SARS-COV-2 AG UPPER RESP QL IA.RAPID: NOT DETECTED

## 2023-04-04 PROCEDURE — 3078F DIAST BP <80 MM HG: CPT | Performed by: GENERAL PRACTICE

## 2023-04-04 PROCEDURE — 3077F SYST BP >= 140 MM HG: CPT | Performed by: GENERAL PRACTICE

## 2023-04-04 PROCEDURE — 87426 SARSCOV CORONAVIRUS AG IA: CPT | Performed by: GENERAL PRACTICE

## 2023-04-04 PROCEDURE — 99214 OFFICE O/P EST MOD 30 MIN: CPT | Performed by: GENERAL PRACTICE

## 2023-04-04 RX ORDER — HYDROCODONE BITARTRATE AND ACETAMINOPHEN 5; 325 MG/1; MG/1
1 TABLET ORAL EVERY 6 HOURS PRN
Qty: 120 TABLET | Refills: 0 | Status: SHIPPED | OUTPATIENT
Start: 2023-04-04

## 2023-04-04 RX ORDER — AZITHROMYCIN 250 MG/1
TABLET, FILM COATED ORAL
Qty: 6 TABLET | Refills: 0 | Status: SHIPPED | OUTPATIENT
Start: 2023-04-04

## 2023-04-04 RX ORDER — TRIAMTERENE AND HYDROCHLOROTHIAZIDE 37.5; 25 MG/1; MG/1
1 TABLET ORAL EVERY MORNING
Qty: 90 TABLET | Refills: 1 | Status: SHIPPED | OUTPATIENT
Start: 2023-04-04

## 2023-04-04 NOTE — PROGRESS NOTES
Subjective   Jaleesa Nam is a 78 y.o. female.   Chief Complaint   Patient presents with   • URI     For review and evaluation of management of chronic medical problems. Records reviewed. Any recent labs, xrays reviewed and medications reconciled.   URI   This is a new problem. The current episode started in the past 7 days. The problem has been unchanged. There has been no fever. Associated symptoms include congestion and coughing (productive). She has tried nothing for the symptoms.   Back Pain  This is a new problem. The current episode started more than 1 month ago. The problem occurs constantly. The problem has been gradually worsening since onset. The pain is present in the gluteal and lumbar spine. The quality of the pain is described as aching. The pain does not radiate. The pain is at a severity of 5/10. The pain is moderate. The pain is the same all the time. The symptoms are aggravated by bending and sitting. Stiffness is present all day. Pertinent negatives include no bladder incontinence, bowel incontinence, paresthesias or perianal numbness. Risk factors include history of cancer. She has tried analgesics for the symptoms. The treatment provided significant relief.   Arthritis  Presents for follow-up visit. She complains of pain, stiffness and joint warmth. The symptoms have been stable. Affected locations include the right shoulder, left shoulder, left MCP, right MCP, left knee, right knee, right hip, left hip and neck. Her pain is at a severity of 5/10. Associated symptoms include pain at night. Pertinent negatives include no Raynaud's syndrome. Compliance with total regimen is %. Compliance with medications is %.      The following portions of the patient's history were reviewed and updated as appropriate: allergies, current medications, past social history and problem list.    Outpatient Medications Prior to Visit   Medication Sig Dispense Refill   • amitriptyline (ELAVIL) 25 MG  "tablet Take 1 tablet by mouth Every Night. 90 tablet 3   • amLODIPine (NORVASC) 5 MG tablet TAKE ONE TABLET BY MOUTH EVERY DAY 90 tablet 2   • calcium carbonate-vitamin d 600-400 MG-UNIT per tablet Take 1 tablet by mouth Daily.     • gabapentin (NEURONTIN) 300 MG capsule TAKE ONE CAPSULE BY MOUTH BID AND TWO CAPSULES AT BEDTIME 360 capsule 1   • lisinopril (PRINIVIL,ZESTRIL) 20 MG tablet Take 1 tablet by mouth Daily. 90 tablet 3   • LORazepam (Ativan) 0.5 MG tablet 30 mins before procedure, may repeat x 1 2 tablet 0   • HYDROcodone-acetaminophen (NORCO) 5-325 MG per tablet Take 1 tablet by mouth Every 6 (Six) Hours As Needed for Moderate Pain (4-6). 120 tablet 0   • triamterene-hydrochlorothiazide (MAXZIDE-25) 37.5-25 MG per tablet TAKE 1 TABLET BY MOUTH EVERY MORNING. 90 tablet 0     No facility-administered medications prior to visit.       Review of Systems   HENT: Positive for congestion.    Respiratory: Positive for cough (productive).    Gastrointestinal: Negative for bowel incontinence.   Genitourinary: Negative for bladder incontinence.   Musculoskeletal: Positive for arthritis, back pain and stiffness.   Neurological: Negative for paresthesias.     I have reviewed 12 systems with patient. Findings were negative except what is noted below and/or in history of present illness.     Objective   Visit Vitals  /70   Pulse 62   Temp 97.6 °F (36.4 °C) (Oral)   Ht 170.2 cm (67\")   Wt 73.6 kg (162 lb 4.8 oz)   SpO2 97%   BMI 25.42 kg/m²     Physical Exam  Vitals and nursing note reviewed.   Constitutional:       General: She is not in acute distress.     Appearance: She is well-developed.   HENT:      Head: Normocephalic and atraumatic.      Nose: Nose normal.   Eyes:      General:         Right eye: No discharge.         Left eye: No discharge.      Conjunctiva/sclera: Conjunctivae normal.      Pupils: Pupils are equal, round, and reactive to light.   Neck:      Thyroid: No thyromegaly.   Cardiovascular:      " Rate and Rhythm: Normal rate and regular rhythm.      Heart sounds: Normal heart sounds.   Pulmonary:      Effort: Pulmonary effort is normal.      Breath sounds: Rales (few scattered) present.   Musculoskeletal:      Cervical back: Decreased range of motion.      Lumbar back: Tenderness present. Decreased range of motion.      Right knee: Tenderness present.      Comments: Joint changes consistent with osteoarthritis   Lymphadenopathy:      Cervical: No cervical adenopathy.   Skin:     General: Skin is warm and dry.   Neurological:      Mental Status: She is alert and oriented to person, place, and time.     Notes brought forward are reviewed and updated if indicated.     Assessment & Plan   Problems Addressed this Visit        Cardiac and Vasculature    Essential hypertension (Chronic)    Relevant Medications    triamterene-hydrochlorothiazide (MAXZIDE-25) 37.5-25 MG per tablet       Musculoskeletal and Injuries    Degenerative joint disease involving multiple joints on both sides of body (Chronic)    Relevant Medications    HYDROcodone-acetaminophen (NORCO) 5-325 MG per tablet       Neuro    Degenerative disc disease, lumbar (Chronic)    Relevant Medications    HYDROcodone-acetaminophen (NORCO) 5-325 MG per tablet   Other Visit Diagnoses     Acute bronchitis, unspecified organism    -  Primary    Relevant Medications    azithromycin (Zithromax Z-Colt) 250 MG tablet    Head congestion        Relevant Orders    POCT SARS-CoV-2 Antigen PHILLIP (Completed)      Diagnoses       Codes Comments    Acute bronchitis, unspecified organism    -  Primary ICD-10-CM: J20.9  ICD-9-CM: 466.0     Degenerative disc disease, lumbar     ICD-10-CM: M51.36  ICD-9-CM: 722.52     Degenerative joint disease involving multiple joints on both sides of body     ICD-10-CM: M15.9  ICD-9-CM: 715.89     Essential hypertension     ICD-10-CM: I10  ICD-9-CM: 401.9     Head congestion     ICD-10-CM: R09.81  ICD-9-CM: 478.19          Continue current  medications. Recheck if not improving.  Ricki reviewed and appropriate. Not recommended to drive or operate heavy equipment while taking potentially sedating meds.  Patient understands the risks associated with this controlled medication, including tolerance and addiction. They also agree to obtain this medication only from me, and not from a another provider, unless that provider is covering for me in my absence. They also agree to be compliant in dosing, and not self adjust the dose of medication.  A signed controlled substance agreement is on file, and they have received a controlled substance education sheet at this or a previous visit. They have also signed a consent for treatment with a controlled substance as per [facility] policy.        New Medications Ordered This Visit   Medications   • HYDROcodone-acetaminophen (NORCO) 5-325 MG per tablet     Sig: Take 1 tablet by mouth Every 6 (Six) Hours As Needed for Moderate Pain (4-6).     Dispense:  120 tablet     Refill:  0   • triamterene-hydrochlorothiazide (MAXZIDE-25) 37.5-25 MG per tablet     Sig: Take 1 tablet by mouth Every Morning.     Dispense:  90 tablet     Refill:  1     03/10/2023 3:40:45 PM   • azithromycin (Zithromax Z-Colt) 250 MG tablet     Sig: Take 2 tablets by mouth on day 1, then 1 tablet daily on days 2-5     Dispense:  6 tablet     Refill:  0     Return in about 6 weeks (around 5/16/2023) for Recheck.        This document has been electronically signed by Emi Wright MD on April 4, 2023 15:20 CDT

## 2023-04-12 ENCOUNTER — TELEPHONE (OUTPATIENT)
Dept: FAMILY MEDICINE CLINIC | Facility: CLINIC | Age: 78
End: 2023-04-12
Payer: MEDICARE

## 2023-04-12 RX ORDER — DOXYCYCLINE HYCLATE 100 MG/1
100 CAPSULE ORAL 2 TIMES DAILY
Qty: 14 CAPSULE | Refills: 0 | Status: SHIPPED | OUTPATIENT
Start: 2023-04-12

## 2023-04-12 NOTE — TELEPHONE ENCOUNTER
You called Jaleesa a Z-Pac in last week and she called to let you know that she isn't any better and  something else.

## 2023-04-12 NOTE — TELEPHONE ENCOUNTER
Called patient, no answer left detailed VM to pickup new Abx, take Probiotic while on Abx and to let us know if she is not any better by next week. Did advise that Dr. Wright is out of the office until next Tuesday, if it is emergent to go to UC or the ED.

## 2023-05-16 ENCOUNTER — OFFICE VISIT (OUTPATIENT)
Dept: FAMILY MEDICINE CLINIC | Facility: CLINIC | Age: 78
End: 2023-05-16
Payer: MEDICARE

## 2023-05-16 VITALS
HEIGHT: 67 IN | BODY MASS INDEX: 25.43 KG/M2 | DIASTOLIC BLOOD PRESSURE: 70 MMHG | SYSTOLIC BLOOD PRESSURE: 140 MMHG | WEIGHT: 162 LBS | OXYGEN SATURATION: 97 % | HEART RATE: 71 BPM

## 2023-05-16 DIAGNOSIS — M50.90 CERVICAL DISC DISEASE: Chronic | ICD-10-CM

## 2023-05-16 DIAGNOSIS — M51.36 DEGENERATIVE DISC DISEASE, LUMBAR: Chronic | ICD-10-CM

## 2023-05-16 DIAGNOSIS — I10 ESSENTIAL HYPERTENSION: ICD-10-CM

## 2023-05-16 DIAGNOSIS — M15.9 DEGENERATIVE JOINT DISEASE INVOLVING MULTIPLE JOINTS ON BOTH SIDES OF BODY: Primary | Chronic | ICD-10-CM

## 2023-05-16 RX ORDER — DICLOFENAC SODIUM 75 MG/1
75 TABLET, DELAYED RELEASE ORAL 2 TIMES DAILY
Qty: 60 TABLET | Refills: 0 | Status: SHIPPED | OUTPATIENT
Start: 2023-05-16

## 2023-05-16 RX ORDER — TRIAMCINOLONE ACETONIDE 40 MG/ML
80 INJECTION, SUSPENSION INTRA-ARTICULAR; INTRAMUSCULAR ONCE
Status: COMPLETED | OUTPATIENT
Start: 2023-05-16 | End: 2023-05-16

## 2023-05-16 RX ORDER — AMLODIPINE BESYLATE 5 MG/1
5 TABLET ORAL DAILY
Qty: 90 TABLET | Refills: 1 | Status: SHIPPED | OUTPATIENT
Start: 2023-05-16

## 2023-05-16 RX ORDER — HYDROCODONE BITARTRATE AND ACETAMINOPHEN 5; 325 MG/1; MG/1
1 TABLET ORAL EVERY 6 HOURS PRN
Qty: 120 TABLET | Refills: 0 | Status: SHIPPED | OUTPATIENT
Start: 2023-05-16

## 2023-05-16 RX ADMIN — TRIAMCINOLONE ACETONIDE 80 MG: 40 INJECTION, SUSPENSION INTRA-ARTICULAR; INTRAMUSCULAR at 14:30

## 2023-05-16 NOTE — PROGRESS NOTES
Subjective   Jaleesa Nam is a 78 y.o. female.   Chief Complaint   Patient presents with   • Hypertension   • Back Pain   • Osteoarthritis     For review and evaluation of management of chronic medical problems. Records reviewed. Any recent labs, xrays reviewed and medications reconciled.  Is having more pain in joints.  Back Pain  This is a new problem. The current episode started more than 1 month ago. The problem occurs constantly. The problem has been gradually worsening since onset. The pain is present in the gluteal and lumbar spine. The quality of the pain is described as aching. The pain does not radiate. The pain is at a severity of 9/10. The pain is moderate. The pain is the same all the time. The symptoms are aggravated by bending and sitting. Stiffness is present all day. Pertinent negatives include no bladder incontinence, bowel incontinence, paresthesias or perianal numbness. Risk factors include history of cancer. She has tried analgesics for the symptoms. The treatment provided significant relief.   Arthritis  Presents for follow-up visit. She complains of pain, stiffness and joint warmth. The symptoms have been stable. Affected locations include the right shoulder, left shoulder, left MCP, right MCP, left knee, right knee, right hip, left hip and neck. Her pain is at a severity of 9/10. Associated symptoms include pain at night. Pertinent negatives include no Raynaud's syndrome. Compliance with total regimen is %. Compliance with medications is %.      The following portions of the patient's history were reviewed and updated as appropriate: allergies, current medications, past social history and problem list.    Outpatient Medications Prior to Visit   Medication Sig Dispense Refill   • amitriptyline (ELAVIL) 25 MG tablet Take 1 tablet by mouth Every Night. 90 tablet 3   • calcium carbonate-vitamin d 600-400 MG-UNIT per tablet Take 1 tablet by mouth Daily.     • gabapentin  "(NEURONTIN) 300 MG capsule TAKE ONE CAPSULE BY MOUTH BID AND TWO CAPSULES AT BEDTIME 360 capsule 1   • lisinopril (PRINIVIL,ZESTRIL) 20 MG tablet Take 1 tablet by mouth Daily. 90 tablet 3   • LORazepam (Ativan) 0.5 MG tablet 30 mins before procedure, may repeat x 1 2 tablet 0   • triamterene-hydrochlorothiazide (MAXZIDE-25) 37.5-25 MG per tablet Take 1 tablet by mouth Every Morning. 90 tablet 1   • amLODIPine (NORVASC) 5 MG tablet TAKE ONE TABLET BY MOUTH EVERY DAY 90 tablet 2   • HYDROcodone-acetaminophen (NORCO) 5-325 MG per tablet Take 1 tablet by mouth Every 6 (Six) Hours As Needed for Moderate Pain (4-6). 120 tablet 0   • azithromycin (Zithromax Z-Colt) 250 MG tablet Take 2 tablets by mouth on day 1, then 1 tablet daily on days 2-5 (Patient not taking: Reported on 5/16/2023) 6 tablet 0   • doxycycline (VIBRAMYCIN) 100 MG capsule Take 1 capsule by mouth 2 (Two) Times a Day. (Patient not taking: Reported on 5/16/2023) 14 capsule 0     No facility-administered medications prior to visit.       Review of Systems   Gastrointestinal: Negative for bowel incontinence.   Genitourinary: Negative for bladder incontinence.   Musculoskeletal: Positive for arthritis, back pain and stiffness.   Neurological: Negative for paresthesias.     I have reviewed 12 systems with patient. Findings were negative except what is noted below and/or in history of present illness.     Objective   Visit Vitals  /70   Pulse 71   Ht 170.2 cm (67\")   Wt 73.5 kg (162 lb)   SpO2 97%   BMI 25.37 kg/m²     Physical Exam  Vitals and nursing note reviewed.   Constitutional:       General: She is not in acute distress.     Appearance: She is well-developed.   HENT:      Head: Normocephalic and atraumatic.      Nose: Nose normal.   Eyes:      General:         Right eye: No discharge.         Left eye: No discharge.      Conjunctiva/sclera: Conjunctivae normal.      Pupils: Pupils are equal, round, and reactive to light.   Neck:      Thyroid: No " thyromegaly.   Cardiovascular:      Rate and Rhythm: Normal rate and regular rhythm.      Heart sounds: Normal heart sounds.   Pulmonary:      Effort: Pulmonary effort is normal.      Breath sounds: Rales (few scattered) present.   Musculoskeletal:      Cervical back: Decreased range of motion.      Lumbar back: Tenderness present. Decreased range of motion.      Right knee: Tenderness present.      Comments: Joint changes consistent with osteoarthritis   Lymphadenopathy:      Cervical: No cervical adenopathy.   Skin:     General: Skin is warm and dry.   Neurological:      Mental Status: She is alert and oriented to person, place, and time.         Notes brought forward are reviewed and updated if indicated.     Assessment & Plan   Problems Addressed this Visit        Cardiac and Vasculature    Essential hypertension (Chronic)    Relevant Medications    amLODIPine (NORVASC) 5 MG tablet       Musculoskeletal and Injuries    Degenerative joint disease involving multiple joints on both sides of body - Primary (Chronic)    Relevant Medications    diclofenac (VOLTAREN) 75 MG EC tablet    HYDROcodone-acetaminophen (NORCO) 5-325 MG per tablet    triamcinolone acetonide (KENALOG-40) injection 80 mg (Start on 5/16/2023  2:31 PM)       Neuro    Degenerative disc disease, lumbar (Chronic)    Relevant Medications    HYDROcodone-acetaminophen (NORCO) 5-325 MG per tablet    Cervical disc disease (Chronic)    Relevant Medications    triamcinolone acetonide (KENALOG-40) injection 80 mg (Start on 5/16/2023  2:31 PM)   Diagnoses       Codes Comments    Degenerative joint disease involving multiple joints on both sides of body    -  Primary ICD-10-CM: M15.9  ICD-9-CM: 715.89     Essential hypertension     ICD-10-CM: I10  ICD-9-CM: 401.9     Degenerative disc disease, lumbar     ICD-10-CM: M51.36  ICD-9-CM: 722.52     Cervical disc disease     ICD-10-CM: M50.90  ICD-9-CM: 722.91          Try diclofenac cautiously.Instructions given  regarding proper use and potential risks and side effects. Advised to recheck if is having any issues with this medication. Ricki reviewed and appropriate. Not recommended to drive or operate heavy equipment while taking potentially sedating meds.  Patient understands the risks associated with this controlled medication, including tolerance and addiction. They also agree to obtain this medication only from me, and not from a another provider, unless that provider is covering for me in my absence. They also agree to be compliant in dosing, and not self adjust the dose of medication.  A signed controlled substance agreement is on file, and they have received a controlled substance education sheet at this or a previous visit. They have also signed a consent for treatment with a controlled substance as per [facility] policy.        New Medications Ordered This Visit   Medications   • diclofenac (VOLTAREN) 75 MG EC tablet     Sig: Take 1 tablet by mouth 2 (Two) Times a Day.     Dispense:  60 tablet     Refill:  0   • amLODIPine (NORVASC) 5 MG tablet     Sig: Take 1 tablet by mouth Daily.     Dispense:  90 tablet     Refill:  1   • HYDROcodone-acetaminophen (NORCO) 5-325 MG per tablet     Sig: Take 1 tablet by mouth Every 6 (Six) Hours As Needed for Moderate Pain (4-6).     Dispense:  120 tablet     Refill:  0     Patient will call to fill   • triamcinolone acetonide (KENALOG-40) injection 80 mg     Return in about 3 months (around 8/2/2023) for Recheck.        This document has been electronically signed by Emi Wright MD on May 16, 2023 14:30 CDT

## 2023-08-01 ENCOUNTER — OFFICE VISIT (OUTPATIENT)
Dept: ORTHOPEDIC SURGERY | Facility: CLINIC | Age: 78
End: 2023-08-01
Payer: MEDICARE

## 2023-08-01 VITALS — WEIGHT: 157.1 LBS | HEIGHT: 67 IN | BODY MASS INDEX: 24.66 KG/M2

## 2023-08-01 DIAGNOSIS — M17.11 PRIMARY OSTEOARTHRITIS OF RIGHT KNEE: Primary | ICD-10-CM

## 2023-08-01 DIAGNOSIS — G89.29 CHRONIC PAIN OF RIGHT KNEE: Primary | ICD-10-CM

## 2023-08-01 DIAGNOSIS — M25.561 ACUTE PAIN OF RIGHT KNEE: ICD-10-CM

## 2023-08-01 DIAGNOSIS — M25.561 CHRONIC PAIN OF RIGHT KNEE: Primary | ICD-10-CM

## 2023-08-01 DIAGNOSIS — I10 ESSENTIAL HYPERTENSION: Chronic | ICD-10-CM

## 2023-08-01 RX ADMIN — LIDOCAINE HYDROCHLORIDE 2 ML: 10 INJECTION, SOLUTION INFILTRATION; PERINEURAL at 14:26

## 2023-08-01 RX ADMIN — TRIAMCINOLONE ACETONIDE 40 MG: 40 INJECTION, SUSPENSION INTRA-ARTICULAR; INTRAMUSCULAR at 14:26

## 2023-08-03 RX ORDER — LIDOCAINE HYDROCHLORIDE 10 MG/ML
2 INJECTION, SOLUTION INFILTRATION; PERINEURAL
Status: COMPLETED | OUTPATIENT
Start: 2023-08-01 | End: 2023-08-01

## 2023-08-03 RX ORDER — TRIAMCINOLONE ACETONIDE 40 MG/ML
40 INJECTION, SUSPENSION INTRA-ARTICULAR; INTRAMUSCULAR
Status: COMPLETED | OUTPATIENT
Start: 2023-08-01 | End: 2023-08-01

## 2023-08-08 ENCOUNTER — OFFICE VISIT (OUTPATIENT)
Dept: FAMILY MEDICINE CLINIC | Facility: CLINIC | Age: 78
End: 2023-08-08
Payer: MEDICARE

## 2023-08-08 VITALS
WEIGHT: 158 LBS | BODY MASS INDEX: 24.8 KG/M2 | DIASTOLIC BLOOD PRESSURE: 72 MMHG | SYSTOLIC BLOOD PRESSURE: 140 MMHG | OXYGEN SATURATION: 94 % | HEIGHT: 67 IN | HEART RATE: 65 BPM

## 2023-08-08 DIAGNOSIS — M51.36 DEGENERATIVE DISC DISEASE, LUMBAR: Chronic | ICD-10-CM

## 2023-08-08 DIAGNOSIS — M50.90 CERVICAL DISC DISEASE: Chronic | ICD-10-CM

## 2023-08-08 DIAGNOSIS — M15.9 DEGENERATIVE JOINT DISEASE INVOLVING MULTIPLE JOINTS ON BOTH SIDES OF BODY: Chronic | ICD-10-CM

## 2023-08-08 PROCEDURE — 3077F SYST BP >= 140 MM HG: CPT | Performed by: GENERAL PRACTICE

## 2023-08-08 PROCEDURE — 99213 OFFICE O/P EST LOW 20 MIN: CPT | Performed by: GENERAL PRACTICE

## 2023-08-08 PROCEDURE — 3078F DIAST BP <80 MM HG: CPT | Performed by: GENERAL PRACTICE

## 2023-08-08 RX ORDER — GABAPENTIN 300 MG/1
CAPSULE ORAL
Qty: 360 CAPSULE | Refills: 1 | Status: SHIPPED | OUTPATIENT
Start: 2023-08-08

## 2023-08-08 RX ORDER — HYDROCODONE BITARTRATE AND ACETAMINOPHEN 5; 325 MG/1; MG/1
1 TABLET ORAL EVERY 6 HOURS PRN
Qty: 120 TABLET | Refills: 0 | Status: SHIPPED | OUTPATIENT
Start: 2023-08-08

## 2023-08-08 NOTE — PROGRESS NOTES
Subjective   Jaleesa Nam is a 78 y.o. female.   Chief Complaint   Patient presents with    Degenerative joint disease multiple joints     3 month check up and refills     For review and evaluation of management of chronic medical problems. Records reviewed. Any recent labs, xrays reviewed and medications reconciled.  Has been having more right knee pain, has seen Dr. Kelley who gave her a steroid injection.  Is planning on viscosupplementation injections.  Back Pain  This is a new problem. The current episode started more than 1 month ago. The problem occurs constantly. The problem has been gradually worsening since onset. The pain is present in the gluteal and lumbar spine. The quality of the pain is described as aching. The pain does not radiate. The pain is at a severity of 7/10. The pain is moderate. The pain is The same all the time. The symptoms are aggravated by bending and sitting. Stiffness is present All day. Pertinent negatives include no bladder incontinence, bowel incontinence, paresthesias or perianal numbness. Risk factors include history of cancer. She has tried analgesics for the symptoms. The treatment provided significant relief.   Arthritis  Presents for follow-up visit. She complains of pain, stiffness and joint warmth. The symptoms have been stable. Affected locations include the right shoulder, left shoulder, left MCP, right MCP, left knee, right knee, right hip, left hip and neck. Her pain is at a severity of 7/10. Associated symptoms include pain at night. Pertinent negatives include no Raynaud's syndrome. Compliance with total regimen is %. Compliance with medications is %.      The following portions of the patient's history were reviewed and updated as appropriate: allergies, current medications, past social history and problem list.    Outpatient Medications Prior to Visit   Medication Sig Dispense Refill    amitriptyline (ELAVIL) 25 MG tablet Take 1 tablet by mouth  "Every Night. 90 tablet 3    amLODIPine (NORVASC) 5 MG tablet Take 1 tablet by mouth Daily. 90 tablet 1    calcium carbonate-vitamin d 600-400 MG-UNIT per tablet Take 1 tablet by mouth Daily.      lisinopril (PRINIVIL,ZESTRIL) 20 MG tablet Take 1 tablet by mouth Daily. 90 tablet 3    gabapentin (NEURONTIN) 300 MG capsule TAKE ONE CAPSULE BY MOUTH BID AND TWO CAPSULES AT BEDTIME 360 capsule 1    HYDROcodone-acetaminophen (NORCO) 5-325 MG per tablet Take 1 tablet by mouth Every 6 (Six) Hours As Needed for Moderate Pain (4-6). 120 tablet 0     No facility-administered medications prior to visit.       Review of Systems   Gastrointestinal:  Negative for bowel incontinence.   Genitourinary:  Negative for bladder incontinence.   Musculoskeletal:  Positive for arthritis, back pain and stiffness.   Neurological:  Negative for paresthesias.   I have reviewed 12 systems with patient. Findings were negative except what is noted below and/or in history of present illness.     Objective   Visit Vitals  /72   Pulse 65   Ht 170.2 cm (67\")   Wt 71.7 kg (158 lb)   SpO2 94%   BMI 24.75 kg/mý     Physical Exam  Vitals and nursing note reviewed.   Constitutional:       General: She is not in acute distress.     Appearance: She is well-developed.   HENT:      Head: Normocephalic and atraumatic.      Nose: Nose normal.   Eyes:      General:         Right eye: No discharge.         Left eye: No discharge.      Conjunctiva/sclera: Conjunctivae normal.      Pupils: Pupils are equal, round, and reactive to light.   Neck:      Thyroid: No thyromegaly.   Cardiovascular:      Rate and Rhythm: Normal rate and regular rhythm.      Heart sounds: Normal heart sounds.   Pulmonary:      Effort: Pulmonary effort is normal.   Musculoskeletal:      Cervical back: Decreased range of motion.      Lumbar back: Tenderness present. Decreased range of motion.      Right knee: Tenderness present.      Comments: Joint changes consistent with osteoarthritis "   Lymphadenopathy:      Cervical: No cervical adenopathy.   Skin:     General: Skin is warm and dry.   Neurological:      Mental Status: She is alert and oriented to person, place, and time.       Notes brought forward are reviewed and updated if indicated.     Assessment & Plan   Problems Addressed this Visit          Musculoskeletal and Injuries    Degenerative joint disease involving multiple joints on both sides of body (Chronic)    Relevant Medications    HYDROcodone-acetaminophen (NORCO) 5-325 MG per tablet       Neuro    Degenerative disc disease, lumbar (Chronic)    Relevant Medications    HYDROcodone-acetaminophen (NORCO) 5-325 MG per tablet    gabapentin (NEURONTIN) 300 MG capsule    Cervical disc disease (Chronic)    Relevant Medications    gabapentin (NEURONTIN) 300 MG capsule     Diagnoses         Codes Comments    Degenerative disc disease, lumbar     ICD-10-CM: M51.36  ICD-9-CM: 722.52     Degenerative joint disease involving multiple joints on both sides of body     ICD-10-CM: M15.9  ICD-9-CM: 715.89     Cervical disc disease     ICD-10-CM: M50.90  ICD-9-CM: 722.91            Continue current medications.  Follow-up with Dr. Chapman as scheduled. Ricki reviewed and appropriate. Not recommended to drive or operate heavy equipment while taking potentially sedating meds.  Patient understands the risks associated with this controlled medication, including tolerance and addiction. They also agree to obtain this medication only from me, and not from a another provider, unless that provider is covering for me in my absence. They also agree to be compliant in dosing, and not self adjust the dose of medication.  A signed controlled substance agreement is on file, and they have received a controlled substance education sheet at this or a previous visit. They have also signed a consent for treatment with a controlled substance as per [facility] policy.      New Medications Ordered This Visit   Medications     HYDROcodone-acetaminophen (NORCO) 5-325 MG per tablet     Sig: Take 1 tablet by mouth Every 6 (Six) Hours As Needed for Moderate Pain (4-6).     Dispense:  120 tablet     Refill:  0    gabapentin (NEURONTIN) 300 MG capsule     Sig: TAKE ONE CAPSULE BY MOUTH BID AND TWO CAPSULES AT BEDTIME     Dispense:  360 capsule     Refill:  1     Return in about 2 months (around 10/8/2023) for Recheck.        This document has been electronically signed by Emi Wright MD on August 8, 2023 16:05 CDT

## 2023-08-15 ENCOUNTER — TELEPHONE (OUTPATIENT)
Dept: FAMILY MEDICINE CLINIC | Facility: CLINIC | Age: 78
End: 2023-08-15
Payer: MEDICARE

## 2023-08-15 DIAGNOSIS — N39.0 UTI (URINARY TRACT INFECTION), UNCOMPLICATED: Primary | ICD-10-CM

## 2023-08-15 NOTE — TELEPHONE ENCOUNTER
Patient called and said that she thinks that she has another UTI. She wants to  know what does she need to do?  Phone is [phone].

## 2023-08-16 ENCOUNTER — LAB (OUTPATIENT)
Dept: LAB | Facility: HOSPITAL | Age: 78
End: 2023-08-16
Payer: MEDICARE

## 2023-08-16 DIAGNOSIS — N39.0 UTI (URINARY TRACT INFECTION), UNCOMPLICATED: ICD-10-CM

## 2023-08-16 LAB
BACTERIA UR QL AUTO: ABNORMAL /HPF
BILIRUB UR QL STRIP: NEGATIVE
CLARITY UR: CLEAR
COLOR UR: YELLOW
GLUCOSE UR STRIP-MCNC: NEGATIVE MG/DL
HGB UR QL STRIP.AUTO: NEGATIVE
HYALINE CASTS UR QL AUTO: ABNORMAL /LPF
KETONES UR QL STRIP: NEGATIVE
LEUKOCYTE ESTERASE UR QL STRIP.AUTO: ABNORMAL
NITRITE UR QL STRIP: NEGATIVE
PH UR STRIP.AUTO: <=5 [PH] (ref 5–9)
PROT UR QL STRIP: NEGATIVE
RBC # UR STRIP: ABNORMAL /HPF
REF LAB TEST METHOD: ABNORMAL
SP GR UR STRIP: 1.02 (ref 1–1.03)
SQUAMOUS #/AREA URNS HPF: ABNORMAL /HPF
UROBILINOGEN UR QL STRIP: ABNORMAL
WBC # UR STRIP: ABNORMAL /HPF

## 2023-08-16 PROCEDURE — 81001 URINALYSIS AUTO W/SCOPE: CPT

## 2023-08-16 PROCEDURE — 87077 CULTURE AEROBIC IDENTIFY: CPT

## 2023-08-16 PROCEDURE — 87186 SC STD MICRODIL/AGAR DIL: CPT

## 2023-08-16 PROCEDURE — 87086 URINE CULTURE/COLONY COUNT: CPT

## 2023-08-17 RX ORDER — SULFAMETHOXAZOLE AND TRIMETHOPRIM 800; 160 MG/1; MG/1
1 TABLET ORAL 2 TIMES DAILY
Qty: 6 TABLET | Refills: 0 | Status: SHIPPED | OUTPATIENT
Start: 2023-08-17

## 2023-08-18 LAB — BACTERIA SPEC AEROBE CULT: ABNORMAL

## 2023-09-12 ENCOUNTER — CLINICAL SUPPORT (OUTPATIENT)
Dept: ORTHOPEDIC SURGERY | Facility: CLINIC | Age: 78
End: 2023-09-12
Payer: MEDICARE

## 2023-09-12 VITALS — WEIGHT: 156 LBS | BODY MASS INDEX: 24.48 KG/M2 | HEIGHT: 67 IN

## 2023-09-12 DIAGNOSIS — M25.561 CHRONIC PAIN OF RIGHT KNEE: ICD-10-CM

## 2023-09-12 DIAGNOSIS — G89.29 CHRONIC PAIN OF RIGHT KNEE: ICD-10-CM

## 2023-09-12 DIAGNOSIS — M17.11 PRIMARY OSTEOARTHRITIS OF RIGHT KNEE: Primary | ICD-10-CM

## 2023-09-12 PROCEDURE — 20610 DRAIN/INJ JOINT/BURSA W/O US: CPT | Performed by: ORTHOPAEDIC SURGERY

## 2023-09-12 NOTE — PROGRESS NOTES
"Knee Joint Injection      Patient: Jaleesa Nam    YOB: 1945    Chief Complaint   Patient presents with    Right Knee - Follow-up, Pain       History of Present Illness:  Patient is here for an injection.  No new complaints.    Physical Exam: 78 y.o. female  General Appearance:    Alert, cooperative, in no acute distress                   Vitals:    09/12/23 1052   Weight: 70.8 kg (156 lb)   Height: 170.2 cm (67\")   PainSc:   5        Patient is alert and oriented, ×3 no acute distress.  Affect is normal respiratory rate is normal unlabored.  Exam and complaints are unchanged.    Procedure:  Large Joint Arthrocentesis: R knee  Date/Time: 9/12/2023 10:52 AM  Consent given by: patient  Site marked: site marked  Timeout: Immediately prior to procedure a time out was called to verify the correct patient, procedure, equipment, support staff and site/side marked as required   Supporting Documentation  Indications: pain   Procedure Details  Location: knee - R knee  Preparation: Patient was prepped and draped in the usual sterile fashion  Needle size: 22 G  Approach: anteromedial  Medications administered: 6 mL hylan 48 MG/6ML  Patient tolerance: patient tolerated the procedure well with no immediate complications      Assessment:    Diagnoses and all orders for this visit:    Primary osteoarthritis of right knee  -     Large Joint Arthrocentesis: R knee    Chronic pain of right knee  -     Large Joint Arthrocentesis: R knee        Plan:   Slowly increase activity as tolerated.  Discussed importance of leg strengthening and general conditioning.  Discussed warning signs of injection.  Discussed that purpose of injections was symptom improvement and improved activity.  Also discussed that further treatment options depended on symptoms at followup and length of time of improvement after injections.    Return if symptoms worsen or fail to improve, for recheck.    Triston Thakkar MD         "

## 2024-02-21 NOTE — TELEPHONE ENCOUNTER
----- Message from Emi Wright MD sent at 11/3/2022  5:45 PM CDT -----  Needs her mammogram scheduled for her appt 11/17    
[facility]. The earliest appt available is 11/28/22 @ 1181. Patient has been notified of appt date/time. Patient has been advised to call the [facility] a few days before her appt with Dr. Wright to see if they have had a cancellation closer to the date of her appt.   
Detail Level: Simple
Additional Notes: Patient consent was obtained to proceed with the visit and recommended plan of care after discussion of all risks and benefits, including the risks of COVID-19 exposure.

## 2024-09-23 NOTE — TELEPHONE ENCOUNTER
Pharmacy states that they received 2 different script for gabapentin and wants to know which one should be filled.    The patient is a 62y Female complaining of shortness of breath.

## (undated) DEVICE — PRECISION THIN (9.0 X 0.38 X 31.0MM)

## (undated) DEVICE — GOWN,AURORA,NOREINF,RAGLAN,XL,STERILE: Brand: MEDLINE

## (undated) DEVICE — K-WIRE
Type: IMPLANTABLE DEVICE | Site: FOOT | Status: NON-FUNCTIONAL
Brand: ASNIS
Removed: 2019-09-11

## (undated) DEVICE — BNDG ELAS ELITE V/CLOSE 4IN 5YD LF STRL

## (undated) DEVICE — DISPOSABLE TOURNIQUET CUFF SINGLE BLADDER, DUAL PORT AND QUICK CONNECT CONNECTOR: Brand: COLOR CUFF

## (undated) DEVICE — PAD UNDERCAST WYTEX 4IN 4YD LF STRL

## (undated) DEVICE — GLV SURG TRIUMPH LT PF LTX 6 STRL

## (undated) DEVICE — GLV SURG TRIUMPH LT PF LTX 6.5 STRL

## (undated) DEVICE — SPNG GZ WOVN 4X4IN 12PLY 10/BX STRL

## (undated) DEVICE — UNDRPD BREATH 23X36 BG/10

## (undated) DEVICE — PRECISION THIN (5.5 X 0.38 X 18.0MM)

## (undated) DEVICE — SOL IRR NACL 0.9PCT BT 1000ML

## (undated) DEVICE — CANNULATED SCREWDRIVER

## (undated) DEVICE — CANNULATED COUNTERSINK: Brand: ASNIS

## (undated) DEVICE — ANTIBACTERIAL UNDYED BRAIDED (POLYGLACTIN 910), SYNTHETIC ABSORBABLE SUTURE: Brand: COATED VICRYL

## (undated) DEVICE — STERILE POLYISOPRENE POWDER-FREE SURGICAL GLOVES WITH EMOLLIENT COATING: Brand: PROTEXIS

## (undated) DEVICE — STERILE POLYISOPRENE POWDER-FREE SURGICAL GLOVES: Brand: PROTEXIS

## (undated) DEVICE — PK POD 60

## (undated) DEVICE — POINTED DRILL BIT

## (undated) DEVICE — SUT PROLN 4/0 RB1 D/A 36IN 8557H

## (undated) DEVICE — SUT VIC 3/0 SH 27IN J416H

## (undated) DEVICE — GLV SURG SENSICARE PI PF LF 7 GRN STRL

## (undated) DEVICE — BNDG ELAS CO-FLEX SLF ADHR 3IN5YD LF2 STRL

## (undated) DEVICE — CVR SURG EQUIP BND RECTG 36X28

## (undated) DEVICE — DRSNG GZ CURAD XEROFORM NONADHS 5X9IN STRL